# Patient Record
Sex: MALE | Race: WHITE | Employment: OTHER | URBAN - METROPOLITAN AREA
[De-identification: names, ages, dates, MRNs, and addresses within clinical notes are randomized per-mention and may not be internally consistent; named-entity substitution may affect disease eponyms.]

---

## 2017-04-24 RX ORDER — MONTELUKAST SODIUM 10 MG/1
TABLET ORAL
Qty: 90 TABLET | Refills: 3 | Status: SHIPPED | OUTPATIENT
Start: 2017-04-24 | End: 2017-05-25 | Stop reason: SDUPTHER

## 2017-04-25 RX ORDER — TAMSULOSIN HYDROCHLORIDE 0.4 MG/1
CAPSULE ORAL
Qty: 90 CAPSULE | Refills: 3 | Status: SHIPPED | OUTPATIENT
Start: 2017-04-25 | End: 2017-05-25 | Stop reason: SDUPTHER

## 2017-05-08 RX ORDER — LISINOPRIL 5 MG/1
5 TABLET ORAL DAILY
Qty: 90 TABLET | Refills: 3 | Status: SHIPPED | OUTPATIENT
Start: 2017-05-08 | End: 2017-05-25 | Stop reason: SDUPTHER

## 2017-05-08 RX ORDER — METOPROLOL SUCCINATE 25 MG/1
TABLET, EXTENDED RELEASE ORAL
Qty: 90 TABLET | Refills: 3 | Status: SHIPPED | OUTPATIENT
Start: 2017-05-08 | End: 2017-05-25 | Stop reason: SDUPTHER

## 2017-05-25 ENCOUNTER — OFFICE VISIT (OUTPATIENT)
Dept: INTERNAL MEDICINE | Age: 77
End: 2017-05-25
Payer: MEDICARE

## 2017-05-25 VITALS
BODY MASS INDEX: 30.26 KG/M2 | HEIGHT: 65 IN | SYSTOLIC BLOOD PRESSURE: 125 MMHG | HEART RATE: 61 BPM | DIASTOLIC BLOOD PRESSURE: 76 MMHG | WEIGHT: 181.6 LBS

## 2017-05-25 DIAGNOSIS — I10 ESSENTIAL HYPERTENSION: Primary | ICD-10-CM

## 2017-05-25 DIAGNOSIS — J45.20 MILD INTERMITTENT ASTHMA WITHOUT COMPLICATION: ICD-10-CM

## 2017-05-25 DIAGNOSIS — M17.10 ARTHRITIS OF KNEE: ICD-10-CM

## 2017-05-25 DIAGNOSIS — L84 CALLUS OF FOOT: ICD-10-CM

## 2017-05-25 PROCEDURE — 1036F TOBACCO NON-USER: CPT | Performed by: INTERNAL MEDICINE

## 2017-05-25 PROCEDURE — 99213 OFFICE O/P EST LOW 20 MIN: CPT

## 2017-05-25 PROCEDURE — 99213 OFFICE O/P EST LOW 20 MIN: CPT | Performed by: INTERNAL MEDICINE

## 2017-05-25 PROCEDURE — 4040F PNEUMOC VAC/ADMIN/RCVD: CPT | Performed by: INTERNAL MEDICINE

## 2017-05-25 PROCEDURE — G8427 DOCREV CUR MEDS BY ELIG CLIN: HCPCS | Performed by: INTERNAL MEDICINE

## 2017-05-25 PROCEDURE — G8417 CALC BMI ABV UP PARAM F/U: HCPCS | Performed by: INTERNAL MEDICINE

## 2017-05-25 PROCEDURE — 1123F ACP DISCUSS/DSCN MKR DOCD: CPT | Performed by: INTERNAL MEDICINE

## 2017-05-25 RX ORDER — MONTELUKAST SODIUM 10 MG/1
TABLET ORAL
Qty: 90 TABLET | Refills: 3 | Status: SHIPPED | OUTPATIENT
Start: 2017-05-25 | End: 2018-02-22 | Stop reason: SDUPTHER

## 2017-05-25 RX ORDER — METOPROLOL SUCCINATE 25 MG/1
TABLET, EXTENDED RELEASE ORAL
Qty: 90 TABLET | Refills: 3 | Status: SHIPPED | OUTPATIENT
Start: 2017-05-25 | End: 2018-02-22

## 2017-05-25 RX ORDER — FLUTICASONE PROPIONATE 50 MCG
SPRAY, SUSPENSION (ML) NASAL
Qty: 1 BOTTLE | Refills: 5 | Status: SHIPPED | OUTPATIENT
Start: 2017-05-25 | End: 2018-02-22

## 2017-05-25 RX ORDER — TAMSULOSIN HYDROCHLORIDE 0.4 MG/1
CAPSULE ORAL
Qty: 90 CAPSULE | Refills: 3 | Status: SHIPPED | OUTPATIENT
Start: 2017-05-25 | End: 2018-02-22 | Stop reason: SDUPTHER

## 2017-05-25 RX ORDER — LISINOPRIL 5 MG/1
5 TABLET ORAL DAILY
Qty: 90 TABLET | Refills: 3 | Status: SHIPPED | OUTPATIENT
Start: 2017-05-25 | End: 2018-02-22 | Stop reason: SDUPTHER

## 2017-05-25 ASSESSMENT — PATIENT HEALTH QUESTIONNAIRE - PHQ9
SUM OF ALL RESPONSES TO PHQ QUESTIONS 1-9: 4
SUM OF ALL RESPONSES TO PHQ9 QUESTIONS 1 & 2: 0
10. IF YOU CHECKED OFF ANY PROBLEMS, HOW DIFFICULT HAVE THESE PROBLEMS MADE IT FOR YOU TO DO YOUR WORK, TAKE CARE OF THINGS AT HOME, OR GET ALONG WITH OTHER PEOPLE: 0
1. LITTLE INTEREST OR PLEASURE IN DOING THINGS: 0
2. FEELING DOWN, DEPRESSED OR HOPELESS: 0
8. MOVING OR SPEAKING SO SLOWLY THAT OTHER PEOPLE COULD HAVE NOTICED. OR THE OPPOSITE, BEING SO FIGETY OR RESTLESS THAT YOU HAVE BEEN MOVING AROUND A LOT MORE THAN USUAL: 0
6. FEELING BAD ABOUT YOURSELF - OR THAT YOU ARE A FAILURE OR HAVE LET YOURSELF OR YOUR FAMILY DOWN: 0
3. TROUBLE FALLING OR STAYING ASLEEP: 2
5. POOR APPETITE OR OVEREATING: 0
9. THOUGHTS THAT YOU WOULD BE BETTER OFF DEAD, OR OF HURTING YOURSELF: 0
4. FEELING TIRED OR HAVING LITTLE ENERGY: 1
7. TROUBLE CONCENTRATING ON THINGS, SUCH AS READING THE NEWSPAPER OR WATCHING TELEVISION: 1

## 2017-05-25 ASSESSMENT — ENCOUNTER SYMPTOMS
GASTROINTESTINAL NEGATIVE: 1
EYES NEGATIVE: 1
SHORTNESS OF BREATH: 1
ALLERGIC/IMMUNOLOGIC NEGATIVE: 1

## 2017-08-29 ENCOUNTER — OFFICE VISIT (OUTPATIENT)
Dept: INTERNAL MEDICINE | Age: 77
End: 2017-08-29
Payer: MEDICARE

## 2017-08-29 VITALS
WEIGHT: 182 LBS | DIASTOLIC BLOOD PRESSURE: 68 MMHG | HEIGHT: 65 IN | SYSTOLIC BLOOD PRESSURE: 128 MMHG | HEART RATE: 57 BPM | BODY MASS INDEX: 30.32 KG/M2

## 2017-08-29 DIAGNOSIS — J45.20 MILD INTERMITTENT ASTHMA WITHOUT COMPLICATION: ICD-10-CM

## 2017-08-29 DIAGNOSIS — M17.10 ARTHRITIS OF KNEE: ICD-10-CM

## 2017-08-29 DIAGNOSIS — I10 ESSENTIAL HYPERTENSION: Primary | ICD-10-CM

## 2017-08-29 DIAGNOSIS — I20.1 ANGINA PECTORIS, VARIANT (HCC): ICD-10-CM

## 2017-08-29 PROCEDURE — 1036F TOBACCO NON-USER: CPT | Performed by: INTERNAL MEDICINE

## 2017-08-29 PROCEDURE — G8427 DOCREV CUR MEDS BY ELIG CLIN: HCPCS | Performed by: INTERNAL MEDICINE

## 2017-08-29 PROCEDURE — 1123F ACP DISCUSS/DSCN MKR DOCD: CPT | Performed by: INTERNAL MEDICINE

## 2017-08-29 PROCEDURE — 4040F PNEUMOC VAC/ADMIN/RCVD: CPT | Performed by: INTERNAL MEDICINE

## 2017-08-29 PROCEDURE — 99213 OFFICE O/P EST LOW 20 MIN: CPT

## 2017-08-29 PROCEDURE — G8417 CALC BMI ABV UP PARAM F/U: HCPCS | Performed by: INTERNAL MEDICINE

## 2017-08-29 PROCEDURE — 99213 OFFICE O/P EST LOW 20 MIN: CPT | Performed by: INTERNAL MEDICINE

## 2017-08-29 PROCEDURE — G8598 ASA/ANTIPLAT THER USED: HCPCS | Performed by: INTERNAL MEDICINE

## 2017-08-29 ASSESSMENT — ENCOUNTER SYMPTOMS
SHORTNESS OF BREATH: 1
EYES NEGATIVE: 1
ALLERGIC/IMMUNOLOGIC NEGATIVE: 1
GASTROINTESTINAL NEGATIVE: 1

## 2017-10-26 ENCOUNTER — TELEPHONE (OUTPATIENT)
Dept: INTERNAL MEDICINE | Age: 77
End: 2017-10-26

## 2017-10-26 NOTE — TELEPHONE ENCOUNTER
PC to [facility]----Stress test from 10/12/17 scanned into media along with EKG scanned into media

## 2018-02-21 ENCOUNTER — TELEPHONE (OUTPATIENT)
Dept: INTERNAL MEDICINE | Age: 78
End: 2018-02-21

## 2018-02-22 ENCOUNTER — OFFICE VISIT (OUTPATIENT)
Dept: INTERNAL MEDICINE | Age: 78
End: 2018-02-22
Payer: MEDICARE

## 2018-02-22 VITALS
SYSTOLIC BLOOD PRESSURE: 119 MMHG | HEIGHT: 65 IN | DIASTOLIC BLOOD PRESSURE: 71 MMHG | HEART RATE: 63 BPM | WEIGHT: 177 LBS | BODY MASS INDEX: 29.49 KG/M2

## 2018-02-22 DIAGNOSIS — F41.9 ANXIETY: ICD-10-CM

## 2018-02-22 DIAGNOSIS — J45.20 MILD INTERMITTENT ASTHMA WITHOUT COMPLICATION: Primary | ICD-10-CM

## 2018-02-22 DIAGNOSIS — M17.10 ARTHRITIS OF KNEE: ICD-10-CM

## 2018-02-22 DIAGNOSIS — E55.9 VITAMIN D DEFICIENCY: ICD-10-CM

## 2018-02-22 DIAGNOSIS — L84 CALLUS OF FOOT: ICD-10-CM

## 2018-02-22 DIAGNOSIS — E53.8 B12 DEFICIENCY: ICD-10-CM

## 2018-02-22 DIAGNOSIS — N40.1 BENIGN PROSTATIC HYPERPLASIA WITH NOCTURIA: ICD-10-CM

## 2018-02-22 DIAGNOSIS — I10 ESSENTIAL HYPERTENSION: ICD-10-CM

## 2018-02-22 DIAGNOSIS — R35.1 BENIGN PROSTATIC HYPERPLASIA WITH NOCTURIA: ICD-10-CM

## 2018-02-22 PROCEDURE — 4040F PNEUMOC VAC/ADMIN/RCVD: CPT | Performed by: STUDENT IN AN ORGANIZED HEALTH CARE EDUCATION/TRAINING PROGRAM

## 2018-02-22 PROCEDURE — G8417 CALC BMI ABV UP PARAM F/U: HCPCS | Performed by: STUDENT IN AN ORGANIZED HEALTH CARE EDUCATION/TRAINING PROGRAM

## 2018-02-22 PROCEDURE — 99213 OFFICE O/P EST LOW 20 MIN: CPT | Performed by: STUDENT IN AN ORGANIZED HEALTH CARE EDUCATION/TRAINING PROGRAM

## 2018-02-22 PROCEDURE — 99214 OFFICE O/P EST MOD 30 MIN: CPT | Performed by: STUDENT IN AN ORGANIZED HEALTH CARE EDUCATION/TRAINING PROGRAM

## 2018-02-22 PROCEDURE — G8484 FLU IMMUNIZE NO ADMIN: HCPCS | Performed by: STUDENT IN AN ORGANIZED HEALTH CARE EDUCATION/TRAINING PROGRAM

## 2018-02-22 PROCEDURE — G8427 DOCREV CUR MEDS BY ELIG CLIN: HCPCS | Performed by: STUDENT IN AN ORGANIZED HEALTH CARE EDUCATION/TRAINING PROGRAM

## 2018-02-22 PROCEDURE — 1036F TOBACCO NON-USER: CPT | Performed by: STUDENT IN AN ORGANIZED HEALTH CARE EDUCATION/TRAINING PROGRAM

## 2018-02-22 PROCEDURE — 1123F ACP DISCUSS/DSCN MKR DOCD: CPT | Performed by: STUDENT IN AN ORGANIZED HEALTH CARE EDUCATION/TRAINING PROGRAM

## 2018-02-22 RX ORDER — MONTELUKAST SODIUM 10 MG/1
10 TABLET ORAL NIGHTLY
Qty: 90 TABLET | Refills: 1 | Status: SHIPPED | OUTPATIENT
Start: 2018-02-22 | End: 2018-07-19 | Stop reason: SDUPTHER

## 2018-02-22 RX ORDER — TAMSULOSIN HYDROCHLORIDE 0.4 MG/1
0.4 CAPSULE ORAL DAILY
Qty: 90 CAPSULE | Refills: 1 | Status: SHIPPED | OUTPATIENT
Start: 2018-02-22 | End: 2018-07-19 | Stop reason: SDUPTHER

## 2018-02-22 RX ORDER — METOPROLOL SUCCINATE 25 MG/1
TABLET, EXTENDED RELEASE ORAL
Qty: 90 TABLET | Refills: 3 | Status: CANCELLED | OUTPATIENT
Start: 2018-02-22

## 2018-02-22 RX ORDER — LISINOPRIL 5 MG/1
5 TABLET ORAL DAILY
Qty: 90 TABLET | Refills: 1 | Status: SHIPPED | OUTPATIENT
Start: 2018-02-22 | End: 2018-06-18 | Stop reason: ALTCHOICE

## 2018-02-22 RX ORDER — ALPRAZOLAM 0.25 MG/1
0.25 TABLET ORAL NIGHTLY PRN
Qty: 4 TABLET | Refills: 0 | Status: SHIPPED | OUTPATIENT
Start: 2018-02-22 | End: 2018-03-24

## 2018-02-22 RX ORDER — ACETAMINOPHEN 500 MG
500 TABLET ORAL EVERY 8 HOURS PRN
Qty: 120 TABLET | Refills: 2 | Status: SHIPPED | OUTPATIENT
Start: 2018-02-22 | End: 2018-07-19 | Stop reason: SDUPTHER

## 2018-02-22 RX ORDER — ATENOLOL 25 MG/1
25 TABLET ORAL DAILY
Qty: 90 TABLET | Refills: 1 | Status: SHIPPED | OUTPATIENT
Start: 2018-02-22 | End: 2018-07-19 | Stop reason: SDUPTHER

## 2018-02-22 ASSESSMENT — PATIENT HEALTH QUESTIONNAIRE - PHQ9
1. LITTLE INTEREST OR PLEASURE IN DOING THINGS: 0
2. FEELING DOWN, DEPRESSED OR HOPELESS: 0
SUM OF ALL RESPONSES TO PHQ9 QUESTIONS 1 & 2: 0
SUM OF ALL RESPONSES TO PHQ QUESTIONS 1-9: 0

## 2018-02-22 NOTE — PROGRESS NOTES
400 UNITS CAPS Take 400 Units by mouth daily.  calcium & magnesium carbonates (MYLANTA) per tablet Take  by mouth daily. Half tab       SAW PALMETTO by Does not apply route. No current facility-administered medications on file prior to visit. SOCIAL HISTORY    Reviewed and no change from previous record. Za Watkins  reports that he has never smoked. He has never used smokeless tobacco.    FAMILY HISTORY:    Reviewed and No change from previous visit    REVIEW OF SYSTEMS:    ENT: negative  Respiratory: no cough, shortness of breath, or wheezing  Cardiovascular: no chest pain or dyspnea on exertion  Gastrointestinal: no abdominal pain, black or bloody stools  Neurological: no TIA or stroke symptoms  Endocrine: negative  Genito-Urinary: no dysuria, trouble voiding, or hematuria  Musculoskeletal: negative  Dermatological: negative    PHYSICAL EXAM:      Vitals:    02/22/18 1320   BP: 119/71   Pulse: 63     General appearance - alert, well appearing, and in no distress  Chest - clear to auscultation, no wheezes, rales or rhonchi, symmetric air entry  Heart - normal rate, regular rhythm, normal S1, S2, no murmurs, rubs, clicks or gallops  Abdomen - soft, nontender, nondistended, no masses or organomegaly   Male - no penile lesions or discharge, no testicular masses or tenderness, no hernias  Neurological - alert, oriented, normal speech, no focal findings or movement disorder noted  Musculoskeletal - no joint tenderness, deformity or swelling. Left foot has a callus on the sole.     LABORATORY FINDINGS:    CBC:No results found for: WBC, HGB, PLT  BMP:  No results found for: NA, K, CL, CO2, BUN, CREATININE, GLUCOSE  HEMOGLOBIN A1C: No results found for: LABA1C  MICROALBUMIN URINE: No results found for: MICROALBUR  FASTING LIPID PANEL:  Lab Results   Component Value Date    CHOL 188 11/21/2016    HDL 54 11/21/2016    TRIG 95 11/21/2016     LIVER PROFILE:No results found for: ALT, AST, PROT, BILITOT,

## 2018-02-22 NOTE — PATIENT INSTRUCTIONS
Return To Clinic 6/18/18. After Visit Summary given and reviewed. bb    It is very important for your care that you keep your appointment. If for some reason you are unable to keep your appointment it is equally important that you call our office at [phone] to cancel your appointment and reschedule. Failure to do so may result in your termination from our practice. Referral for podiatry sent to [facility]  they will call pt for appt, copy of referral with number and address given to pt. Pt should call referral number if not heard from within a couple of weeks. LABORATORY INSTRUCTIONS    Your doctor has ordered blood or urine testing. You can get this testing done at the Lab located on the first floor of the [facility], or at any other [facility]. Please stop at Main Registration, before going to the lab, as you must be registered first.     Please get this lab done before your next visit.     You may eat or drink before this test.

## 2018-03-01 LAB
ALBUMIN SERPL-MCNC: 4.6 G/DL (ref 3.5–5.2)
ALK PHOSPHATASE: 69 U/L (ref 39–118)
ALT SERPL-CCNC: 19 U/L (ref 5–50)
ANION GAP SERPL CALCULATED.3IONS-SCNC: 14 MEQ/L (ref 10–19)
AST SERPL-CCNC: 22 U/L (ref 9–50)
BILIRUB SERPL-MCNC: 0.5 MG/DL
BUN BLDV-MCNC: 23 MG/DL (ref 8–23)
CALCIUM SERPL-MCNC: 10.1 MG/DL (ref 8.5–10.5)
CHLORIDE BLD-SCNC: 103 MEQ/L (ref 95–107)
CO2: 26 MEQ/L (ref 19–31)
CREAT SERPL-MCNC: 1.5 MG/DL (ref 0.8–1.4)
EGFR AFRICAN AMERICAN: 51.3 ML/MIN/1.73 M2
EGFR IF NONAFRICAN AMERICAN: 44.3 ML/MIN/1.73 M2
FOLATE: >20 UG/L
GLUCOSE: 86 MG/DL (ref 70–99)
POTASSIUM SERPL-SCNC: 4.5 MEQ/L (ref 3.5–5.4)
SODIUM BLD-SCNC: 143 MEQ/L (ref 135–146)
TOTAL PROTEIN: 7.5 G/DL (ref 6.1–8.3)
VITAMIN B-12: 833.8 PG/ML (ref 200–950)

## 2018-03-02 LAB — VITAMIN D 25-HYDROXY: 31 NG/ML

## 2018-05-14 ENCOUNTER — TELEPHONE (OUTPATIENT)
Dept: INTERNAL MEDICINE | Age: 78
End: 2018-05-14

## 2018-06-18 ENCOUNTER — OFFICE VISIT (OUTPATIENT)
Dept: INTERNAL MEDICINE | Age: 78
End: 2018-06-18
Payer: MEDICARE

## 2018-06-18 VITALS
SYSTOLIC BLOOD PRESSURE: 127 MMHG | BODY MASS INDEX: 29.99 KG/M2 | HEIGHT: 65 IN | DIASTOLIC BLOOD PRESSURE: 71 MMHG | HEART RATE: 54 BPM | WEIGHT: 180 LBS

## 2018-06-18 DIAGNOSIS — J45.20 MILD INTERMITTENT ASTHMA WITHOUT COMPLICATION: Primary | ICD-10-CM

## 2018-06-18 DIAGNOSIS — R79.89 CREATININE ELEVATION: ICD-10-CM

## 2018-06-18 DIAGNOSIS — N40.1 BENIGN PROSTATIC HYPERPLASIA WITH NOCTURIA: ICD-10-CM

## 2018-06-18 DIAGNOSIS — R35.1 BENIGN PROSTATIC HYPERPLASIA WITH NOCTURIA: ICD-10-CM

## 2018-06-18 DIAGNOSIS — I10 ESSENTIAL HYPERTENSION: ICD-10-CM

## 2018-06-18 PROCEDURE — 4040F PNEUMOC VAC/ADMIN/RCVD: CPT | Performed by: STUDENT IN AN ORGANIZED HEALTH CARE EDUCATION/TRAINING PROGRAM

## 2018-06-18 PROCEDURE — 1123F ACP DISCUSS/DSCN MKR DOCD: CPT | Performed by: STUDENT IN AN ORGANIZED HEALTH CARE EDUCATION/TRAINING PROGRAM

## 2018-06-18 PROCEDURE — G8417 CALC BMI ABV UP PARAM F/U: HCPCS | Performed by: STUDENT IN AN ORGANIZED HEALTH CARE EDUCATION/TRAINING PROGRAM

## 2018-06-18 PROCEDURE — 1036F TOBACCO NON-USER: CPT | Performed by: STUDENT IN AN ORGANIZED HEALTH CARE EDUCATION/TRAINING PROGRAM

## 2018-06-18 PROCEDURE — 99214 OFFICE O/P EST MOD 30 MIN: CPT | Performed by: STUDENT IN AN ORGANIZED HEALTH CARE EDUCATION/TRAINING PROGRAM

## 2018-06-18 PROCEDURE — 99213 OFFICE O/P EST LOW 20 MIN: CPT | Performed by: STUDENT IN AN ORGANIZED HEALTH CARE EDUCATION/TRAINING PROGRAM

## 2018-06-18 PROCEDURE — G8427 DOCREV CUR MEDS BY ELIG CLIN: HCPCS | Performed by: STUDENT IN AN ORGANIZED HEALTH CARE EDUCATION/TRAINING PROGRAM

## 2018-06-18 RX ORDER — FINASTERIDE 5 MG/1
5 TABLET, FILM COATED ORAL DAILY
Qty: 30 TABLET | Refills: 3 | Status: SHIPPED | OUTPATIENT
Start: 2018-06-18 | End: 2018-08-06 | Stop reason: SDUPTHER

## 2018-06-18 RX ORDER — AMLODIPINE BESYLATE 5 MG/1
5 TABLET ORAL DAILY
Qty: 30 TABLET | Refills: 3 | Status: SHIPPED | OUTPATIENT
Start: 2018-06-18 | End: 2018-11-05 | Stop reason: SDUPTHER

## 2018-06-26 LAB
APPEARANCE: CLEAR
BILIRUBIN: NEGATIVE
COLOR: YELLOW
GLUCOSE BLD-MCNC: NEGATIVE MG/DL
KETONES, URINE: NEGATIVE
LEUKOCYTE ESTERASE, URINE: NEGATIVE
NITRITE, URINE: NEGATIVE
OCCULT BLOOD,URINE: NEGATIVE
PH: 6 (ref 5–9)
PROTEIN, URINE: NEGATIVE
SP GRAVITY MISCELLANEOUS: 1.02 (ref 1–1.03)
UROBILINOGEN, URINE: NORMAL

## 2018-06-27 LAB — PSA, ULTRASENSITIVE: 1.42 NG/ML

## 2018-07-12 ENCOUNTER — HOSPITAL ENCOUNTER (OUTPATIENT)
Dept: ULTRASOUND IMAGING | Age: 78
Discharge: HOME | End: 2018-07-14
Payer: MEDICARE

## 2018-07-12 DIAGNOSIS — R79.89 CREATININE ELEVATION: ICD-10-CM

## 2018-07-12 DIAGNOSIS — R35.1 BENIGN PROSTATIC HYPERPLASIA WITH NOCTURIA: ICD-10-CM

## 2018-07-12 DIAGNOSIS — N40.1 BENIGN PROSTATIC HYPERPLASIA WITH NOCTURIA: ICD-10-CM

## 2018-07-12 PROCEDURE — 76872 US TRANSRECTAL: CPT

## 2018-07-12 PROCEDURE — 76770 US EXAM ABDO BACK WALL COMP: CPT

## 2018-07-13 ENCOUNTER — TELEPHONE (OUTPATIENT)
Dept: INTERNAL MEDICINE | Age: 78
End: 2018-07-13

## 2018-07-13 NOTE — TELEPHONE ENCOUNTER
Patient called office and left message wondering if the lab results from [facility] and the ultrasound results from [facility] were available to Dr. Chantel Madison for his appointment on 07/19/18. PC to patient and left message for him verifying these results are available for his next appointment.

## 2018-07-19 ENCOUNTER — OFFICE VISIT (OUTPATIENT)
Dept: INTERNAL MEDICINE | Age: 78
End: 2018-07-19
Payer: MEDICARE

## 2018-07-19 VITALS
WEIGHT: 181 LBS | HEART RATE: 60 BPM | DIASTOLIC BLOOD PRESSURE: 62 MMHG | BODY MASS INDEX: 30.16 KG/M2 | SYSTOLIC BLOOD PRESSURE: 110 MMHG | HEIGHT: 65 IN

## 2018-07-19 DIAGNOSIS — R35.1 BENIGN PROSTATIC HYPERPLASIA WITH NOCTURIA: ICD-10-CM

## 2018-07-19 DIAGNOSIS — M17.10 ARTHRITIS OF KNEE: ICD-10-CM

## 2018-07-19 DIAGNOSIS — Z23 NEED FOR PROPHYLACTIC VACCINATION AND INOCULATION AGAINST VARICELLA: ICD-10-CM

## 2018-07-19 DIAGNOSIS — Z23 NEED FOR PROPHYLACTIC VACCINATION AGAINST STREPTOCOCCUS PNEUMONIAE (PNEUMOCOCCUS): ICD-10-CM

## 2018-07-19 DIAGNOSIS — J45.20 MILD INTERMITTENT ASTHMA WITHOUT COMPLICATION: ICD-10-CM

## 2018-07-19 DIAGNOSIS — I10 ESSENTIAL HYPERTENSION: Primary | ICD-10-CM

## 2018-07-19 DIAGNOSIS — R22.31 MASS OF RIGHT HAND: ICD-10-CM

## 2018-07-19 DIAGNOSIS — N40.1 BENIGN PROSTATIC HYPERPLASIA WITH NOCTURIA: ICD-10-CM

## 2018-07-19 DIAGNOSIS — Z23 NEED FOR PROPHYLACTIC VACCINATION AGAINST DIPHTHERIA-TETANUS-PERTUSSIS (DTP): ICD-10-CM

## 2018-07-19 PROCEDURE — 4040F PNEUMOC VAC/ADMIN/RCVD: CPT | Performed by: INTERNAL MEDICINE

## 2018-07-19 PROCEDURE — G8417 CALC BMI ABV UP PARAM F/U: HCPCS | Performed by: INTERNAL MEDICINE

## 2018-07-19 PROCEDURE — 99213 OFFICE O/P EST LOW 20 MIN: CPT | Performed by: INTERNAL MEDICINE

## 2018-07-19 PROCEDURE — 99214 OFFICE O/P EST MOD 30 MIN: CPT | Performed by: INTERNAL MEDICINE

## 2018-07-19 PROCEDURE — 90732 PPSV23 VACC 2 YRS+ SUBQ/IM: CPT | Performed by: INTERNAL MEDICINE

## 2018-07-19 PROCEDURE — 1101F PT FALLS ASSESS-DOCD LE1/YR: CPT | Performed by: INTERNAL MEDICINE

## 2018-07-19 PROCEDURE — 1123F ACP DISCUSS/DSCN MKR DOCD: CPT | Performed by: INTERNAL MEDICINE

## 2018-07-19 PROCEDURE — 1036F TOBACCO NON-USER: CPT | Performed by: INTERNAL MEDICINE

## 2018-07-19 PROCEDURE — G8427 DOCREV CUR MEDS BY ELIG CLIN: HCPCS | Performed by: INTERNAL MEDICINE

## 2018-07-19 RX ORDER — MONTELUKAST SODIUM 10 MG/1
10 TABLET ORAL NIGHTLY
Qty: 90 TABLET | Refills: 1 | Status: SHIPPED | OUTPATIENT
Start: 2018-07-19 | End: 2018-12-27 | Stop reason: SDUPTHER

## 2018-07-19 RX ORDER — ACETAMINOPHEN 500 MG
500 TABLET ORAL EVERY 8 HOURS PRN
Qty: 120 TABLET | Refills: 2 | Status: SHIPPED | OUTPATIENT
Start: 2018-07-19 | End: 2019-02-11 | Stop reason: SDUPTHER

## 2018-07-19 RX ORDER — ATENOLOL 25 MG/1
25 TABLET ORAL DAILY
Qty: 90 TABLET | Refills: 1 | Status: SHIPPED | OUTPATIENT
Start: 2018-07-19 | End: 2018-08-28

## 2018-07-19 RX ORDER — TAMSULOSIN HYDROCHLORIDE 0.4 MG/1
0.4 CAPSULE ORAL DAILY
Qty: 90 CAPSULE | Refills: 1 | Status: SHIPPED | OUTPATIENT
Start: 2018-07-19 | End: 2018-11-21 | Stop reason: SDUPTHER

## 2018-07-19 ASSESSMENT — ENCOUNTER SYMPTOMS
ABDOMINAL PAIN: 0
NAUSEA: 0
HEARTBURN: 0
DOUBLE VISION: 0
HEMOPTYSIS: 0
COUGH: 0
BLURRED VISION: 0

## 2018-07-19 ASSESSMENT — PATIENT HEALTH QUESTIONNAIRE - PHQ9
1. LITTLE INTEREST OR PLEASURE IN DOING THINGS: 0
SUM OF ALL RESPONSES TO PHQ9 QUESTIONS 1 & 2: 0
2. FEELING DOWN, DEPRESSED OR HOPELESS: 0
SUM OF ALL RESPONSES TO PHQ QUESTIONS 1-9: 0

## 2018-07-19 NOTE — PROGRESS NOTES
[facility]  [facility] [address]  [address] [facility] [address]  Dept: [phone]  Dept Fax: [phone]    Office Progress/Follow Up Note  Date of patient's visit: 7/19/2018  Patient's Name:  Ranjeet Erwin YOB: 1940            Patient Care Team:  Yamilka Avalos MD as PCP - General (Internal Medicine)  Mbonger Sanchez MD as PCP - [facility] Attributed Provider  ================================================================    REASON FOR VISIT/CHIEF COMPLAINT:  Hypertension and Mass (right hand lump)    HISTORY OF PRESENTING ILLNESS:  History was obtained from: patient. Ranjeet Erwin is a 66 y.o. is here for a follow-up visit for review of recent blood work. Had a day done for reevaluation of BPH. He was started on finasteride in addition to Flomax for treatment of BPH symptoms. He tells me that he hasn't noticed that much improvement with the new medication. He has to urinate 3 times at night. Ultrasound shows enlargement of the prostate without any evidence of cancer. Patient has not been evaluated by urology and has not had biopsy done. He has history of hypertension which is controlled. His asthma is also controlled. He is due for medication refills today. Problem list, medications and blood work reviewed       Patient Active Problem List   Diagnosis    Asthma    Osteoarthritis of knees    HTN (hypertension)    Benign prostatic hyperplasia with nocturia       Health Maintenance Due   Topic Date Due    DTaP/Tdap/Td vaccine (1 - Tdap) 07/10/1959    Shingles Vaccine (1 of 2 - 2 Dose Series) 07/10/1990       Allergies   Allergen Reactions    Ibuprofen          Current Outpatient Prescriptions   Medication Sig Dispense Refill    Tdap (ADACEL) 5-2-15.5 LF-MCG/0.5 injection Inject 0.5 mLs into the muscle once for 1 dose 0.5 mL 0    zoster recombinant adjuvanted vaccine (SHINGRIX) 50 MCG SUSR injection 50 MCG IM then repeat 2-6 months.  0.5 mL 1    fluticasone-salmeterol (ADVAIR DISKUS) 250-50 MCG/DOSE AEPB Inhale 1 puff into the lungs 2 times daily 3 each 1    montelukast (SINGULAIR) 10 MG tablet Take 1 tablet by mouth nightly 90 tablet 1    tamsulosin (FLOMAX) 0.4 MG capsule Take 1 capsule by mouth daily 90 capsule 1    acetaminophen (TYLENOL) 500 MG tablet Take 1 tablet by mouth every 8 hours as needed for Pain 120 tablet 2    atenolol (TENORMIN) 25 MG tablet Take 1 tablet by mouth daily Dc Toprol 90 tablet 1    finasteride (PROSCAR) 5 MG tablet Take 1 tablet by mouth daily 30 tablet 3    amLODIPine (NORVASC) 5 MG tablet Take 1 tablet by mouth daily 30 tablet 3    Minoxidil (ROGAINE MENS EX) Apply  topically.  vitamin B-12 (CYANOCOBALAMIN) 1000 MCG tablet Take 1,000 mcg by mouth daily Takes 1/2 tablet daily      Multiple Vitamins-Minerals (COMPLETE SENIOR PO) Take by mouth Takes 1/2 tablet daily      Selenium 200 MCG TABS Take  by mouth. Half tab daily       vitamin D (ERGOCALCIFEROL) 400 UNITS CAPS Take 400 Units by mouth daily Takes 1/2 tablet daily      SAW PALMETTO by Does not apply route.  calcium & magnesium carbonates (MYLANTA) per tablet Take  by mouth daily. Half tab        No current facility-administered medications for this visit. Social History   Substance Use Topics    Smoking status: Never Smoker    Smokeless tobacco: Never Used    Alcohol use 2.4 oz/week     4 Shots of liquor per week       Family History   Problem Relation Age of Onset    Cancer Mother 80        colon    Heart Disease Mother     Heart Disease Father     Other Father 70        ALS        REVIEW OF SYSTEMS:  Review of Systems   Constitutional: Negative for chills and fever. HENT: Negative for congestion, ear discharge and nosebleeds. Eyes: Negative for blurred vision and double vision. Respiratory: Negative for cough and hemoptysis. Cardiovascular: Negative for chest pain and palpitations.    Gastrointestinal: Negative for abdominal pain, heartburn and nausea. Genitourinary: Negative for dysuria and urgency. Musculoskeletal: Negative for myalgias and neck pain. Neurological: Negative for dizziness and headaches. Endo/Heme/Allergies: Does not bruise/bleed easily. Psychiatric/Behavioral: Negative for depression and suicidal ideas. PHYSICAL EXAM:  Vitals:    07/19/18 1343   BP: 110/62   Site: Left Arm   Position: Sitting   Cuff Size: Medium Adult   Pulse: 60   Weight: 181 lb (82.1 kg)   Height: 5' 5\" (1.651 m)     BP Readings from Last 3 Encounters:   07/19/18 110/62   06/18/18 127/71   02/22/18 119/71        Physical Exam   Constitutional: He is oriented to person, place, and time and well-developed, well-nourished, and in no distress. No distress. HENT:   Mouth/Throat: No oropharyngeal exudate. Neck: Normal range of motion. Neck supple. No thyromegaly present. Cardiovascular: Normal rate, regular rhythm, normal heart sounds and intact distal pulses. Pulmonary/Chest: Effort normal and breath sounds normal. No respiratory distress. He has no wheezes. Abdominal: Soft. Bowel sounds are normal. He exhibits no distension and no mass. There is no tenderness. Musculoskeletal: Normal range of motion. He exhibits no edema or tenderness. Neurological: He is alert and oriented to person, place, and time. No cranial nerve deficit. Skin: Skin is warm. No rash noted. He is not diaphoretic. No erythema.    Psychiatric: Mood, memory, affect and judgment normal.         DIAGNOSTIC FINDINGS:  CBC:No results found for: WBC, HGB, PLT    BMP:    Lab Results   Component Value Date     02/28/2018    K 4.5 02/28/2018     02/28/2018    CO2 26 02/28/2018    BUN 23 02/28/2018    CREATININE 1.5 02/28/2018    GLUCOSE NEGATIVE 06/26/2018    GLUCOSE 86 02/28/2018       HEMOGLOBIN A1C: No results found for: LABA1C    FASTING LIPID PANEL:  Lab Results   Component Value Date    CHOL 188 11/21/2016    HDL 54 11/21/2016 TRIG 95 11/21/2016       ASSESSMENT AND PLAN:  Mychal Russ was seen today for hypertension and mass. Diagnoses and all orders for this visit:    Essential hypertension  -     atenolol (TENORMIN) 25 MG tablet; Take 1 tablet by mouth daily Dc Toprol    Need for prophylactic vaccination against diphtheria-tetanus-pertussis (DTP)  -     Tdap (ADACEL) 5-2-15.5 LF-MCG/0.5 injection; Inject 0.5 mLs into the muscle once for 1 dose    Need for prophylactic vaccination and inoculation against varicella  -     zoster recombinant adjuvanted vaccine (SHINGRIX) 50 MCG SUSR injection; 50 MCG IM then repeat 2-6 months. Need for prophylactic vaccination against Streptococcus pneumoniae (pneumococcus)  -     Pneumococcal polysaccharide vaccine 23-valent greater than or equal to 3yo subcutaneous/IM  -     NE IMMUNIZ ADMIN,1 SINGLE/COMB VAC/TOXOID    Mild intermittent asthma without complication  -     fluticasone-salmeterol (ADVAIR DISKUS) 250-50 MCG/DOSE AEPB; Inhale 1 puff into the lungs 2 times daily  -     montelukast (SINGULAIR) 10 MG tablet; Take 1 tablet by mouth nightly    Benign prostatic hyperplasia with nocturia  -     tamsulosin (FLOMAX) 0.4 MG capsule; Take 1 capsule by mouth daily  -     [address], [place]    Osteoarthritis of knees  -     acetaminophen (TYLENOL) 500 MG tablet; Take 1 tablet by mouth every 8 hours as needed for Pain    Mass of right hand  -     XR HAND RIGHT (MIN 3 VIEWS); Future      FOLLOW UP AND INSTRUCTIONS:  · Return in about 3 months (around 10/19/2018) for HTN. · Mychal Russ received counseling on the following healthy behaviors: nutrition and exercise    · Discussed use, benefit, and side effects of prescribed medications. Barriers to medication compliance addressed. All patient questions answered. Pt voiced understanding.      · Patient given educational materials - see patient instructions    Dulce Velasquez MD, ANNALISE, [facility] Physician -

## 2018-07-30 ENCOUNTER — TELEPHONE (OUTPATIENT)
Dept: INTERNAL MEDICINE | Age: 78
End: 2018-07-30

## 2018-07-30 NOTE — TELEPHONE ENCOUNTER
Voice message left from patient stating he had received call from [address] stating they were waiting for a response from Dr. Denise Rios for a refill on medications via fax. PC to [facility], spoke with Alix Merida, who states there our no pending orders from our office that they are waiting on. PC to patient, left voice message that I had spoken with Alix Merida at [address] and they did not indicate they needed a response from our office. Suggested to patient that if he has a Dr. Denise Rios, possibly this order could have originated in another office and he could try there. Asked patient to return call to office if any further questions.

## 2018-08-06 DIAGNOSIS — R35.1 BENIGN PROSTATIC HYPERPLASIA WITH NOCTURIA: ICD-10-CM

## 2018-08-06 DIAGNOSIS — N40.1 BENIGN PROSTATIC HYPERPLASIA WITH NOCTURIA: ICD-10-CM

## 2018-08-07 RX ORDER — FINASTERIDE 5 MG/1
5 TABLET, FILM COATED ORAL DAILY
Qty: 90 TABLET | Refills: 3 | Status: SHIPPED | OUTPATIENT
Start: 2018-08-07 | End: 2018-08-09 | Stop reason: SDUPTHER

## 2018-08-09 DIAGNOSIS — R35.1 BENIGN PROSTATIC HYPERPLASIA WITH NOCTURIA: ICD-10-CM

## 2018-08-09 DIAGNOSIS — N40.1 BENIGN PROSTATIC HYPERPLASIA WITH NOCTURIA: ICD-10-CM

## 2018-08-09 RX ORDER — FINASTERIDE 5 MG/1
5 TABLET, FILM COATED ORAL DAILY
Qty: 90 TABLET | Refills: 3 | Status: SHIPPED | OUTPATIENT
Start: 2018-08-09 | End: 2019-02-11 | Stop reason: SDUPTHER

## 2018-08-09 NOTE — TELEPHONE ENCOUNTER
I pended another order for proscar, it needs to be sent to [facility] instead of [facility]. (I cancelled the [facility] script)  Please sign if applicable. Thank you!       Health Maintenance   Topic Date Due    DTaP/Tdap/Td vaccine (1 - Tdap) 07/10/1959    Shingles Vaccine (1 of 2 - 2 Dose Series) 07/10/1990    Flu vaccine (1) 09/01/2018    Pneumococcal low/med risk  Completed             (applicable per patient's age: Cancer Screenings, Depression Screening, Fall Risk Screening, Immunizations)    LDL Calculated (mg/dL)   Date Value   11/21/2016 115     AST (U/L)   Date Value   02/28/2018 22     ALT (U/L)   Date Value   02/28/2018 19     BUN (mg/dL)   Date Value   02/28/2018 23      (goal A1C is < 7)   (goal LDL is <100) need 30-50% reduction from baseline     BP Readings from Last 3 Encounters:   07/19/18 110/62   06/18/18 127/71   02/22/18 119/71    (goal /80)      All Future Testing planned in [facility]:  Lab Frequency Next Occurrence   Urinalysis Reflex to Culture Once 11/01/2018   PSA Screening Once 11/01/2018   XR HAND RIGHT (MIN 3 VIEWS) Once 11/03/2018       Next Visit Date:  Future Appointments  Date Time Provider Lambert Thomas   11/5/2018 1:15 PM Dulce Burns MD [id number]            Patient Active Problem List:     Asthma     Osteoarthritis of knees     HTN (hypertension)     Benign prostatic hyperplasia with nocturia

## 2018-08-27 DIAGNOSIS — I10 ESSENTIAL HYPERTENSION: ICD-10-CM

## 2018-08-27 NOTE — TELEPHONE ENCOUNTER
E-scribe request for ATENOLOL TAB 25MG. Please review and e-scribe if applicable.      Last Visit Date:  7/19/18  Next Visit Date:  11/5/2018    No results found for: LABA1C          ( goal A1C is < 7)   No results found for: LABMICR  LDL Calculated (mg/dL)   Date Value   11/21/2016 115       (goal LDL is <100)   AST (U/L)   Date Value   02/28/2018 22     ALT (U/L)   Date Value   02/28/2018 19     BUN (mg/dL)   Date Value   02/28/2018 23     BP Readings from Last 3 Encounters:   07/19/18 110/62   06/18/18 127/71   02/22/18 119/71          (goal 120/80)        Patient Active Problem List:     Asthma     Osteoarthritis of knees     HTN (hypertension)     Benign prostatic hyperplasia with nocturia      ----JF

## 2018-08-28 RX ORDER — ATENOLOL 25 MG/1
TABLET ORAL
Qty: 90 TABLET | Refills: 1 | Status: SHIPPED | OUTPATIENT
Start: 2018-08-28 | End: 2019-02-11 | Stop reason: SDUPTHER

## 2018-08-30 RX ORDER — ATENOLOL 25 MG/1
TABLET ORAL
Qty: 90 TABLET | Refills: 1 | Status: CANCELLED | OUTPATIENT
Start: 2018-08-30

## 2018-08-30 NOTE — TELEPHONE ENCOUNTER
I pended this medication again, it needed to be sent to [facility] mail order instead of [facility]. Thank you!

## 2018-11-05 ENCOUNTER — OFFICE VISIT (OUTPATIENT)
Dept: INTERNAL MEDICINE | Age: 78
End: 2018-11-05
Payer: MEDICARE

## 2018-11-05 VITALS
HEART RATE: 63 BPM | DIASTOLIC BLOOD PRESSURE: 70 MMHG | WEIGHT: 183.2 LBS | SYSTOLIC BLOOD PRESSURE: 111 MMHG | BODY MASS INDEX: 30.52 KG/M2 | HEIGHT: 65 IN

## 2018-11-05 DIAGNOSIS — N40.1 BENIGN PROSTATIC HYPERPLASIA WITH NOCTURIA: ICD-10-CM

## 2018-11-05 DIAGNOSIS — M17.10 ARTHRITIS OF KNEE: ICD-10-CM

## 2018-11-05 DIAGNOSIS — I10 ESSENTIAL HYPERTENSION: Primary | ICD-10-CM

## 2018-11-05 DIAGNOSIS — J45.20 MILD INTERMITTENT ASTHMA WITHOUT COMPLICATION: ICD-10-CM

## 2018-11-05 DIAGNOSIS — R35.1 BENIGN PROSTATIC HYPERPLASIA WITH NOCTURIA: ICD-10-CM

## 2018-11-05 PROCEDURE — 99211 OFF/OP EST MAY X REQ PHY/QHP: CPT | Performed by: INTERNAL MEDICINE

## 2018-11-05 PROCEDURE — 99214 OFFICE O/P EST MOD 30 MIN: CPT | Performed by: INTERNAL MEDICINE

## 2018-11-05 PROCEDURE — G8417 CALC BMI ABV UP PARAM F/U: HCPCS | Performed by: INTERNAL MEDICINE

## 2018-11-05 PROCEDURE — 1123F ACP DISCUSS/DSCN MKR DOCD: CPT | Performed by: INTERNAL MEDICINE

## 2018-11-05 PROCEDURE — G8427 DOCREV CUR MEDS BY ELIG CLIN: HCPCS | Performed by: INTERNAL MEDICINE

## 2018-11-05 PROCEDURE — 1036F TOBACCO NON-USER: CPT | Performed by: INTERNAL MEDICINE

## 2018-11-05 PROCEDURE — G8484 FLU IMMUNIZE NO ADMIN: HCPCS | Performed by: INTERNAL MEDICINE

## 2018-11-05 PROCEDURE — 1101F PT FALLS ASSESS-DOCD LE1/YR: CPT | Performed by: INTERNAL MEDICINE

## 2018-11-05 PROCEDURE — 4040F PNEUMOC VAC/ADMIN/RCVD: CPT | Performed by: INTERNAL MEDICINE

## 2018-11-05 RX ORDER — AMLODIPINE BESYLATE 5 MG/1
5 TABLET ORAL DAILY
Qty: 90 TABLET | Refills: 1 | Status: SHIPPED | OUTPATIENT
Start: 2018-11-05 | End: 2019-05-01 | Stop reason: SDUPTHER

## 2018-11-05 ASSESSMENT — ENCOUNTER SYMPTOMS
SHORTNESS OF BREATH: 0
COUGH: 0
EYE PAIN: 0
DIARRHEA: 0
BLOOD IN STOOL: 0
COLOR CHANGE: 0
SORE THROAT: 0
CONSTIPATION: 0
WHEEZING: 0
PHOTOPHOBIA: 0
VOMITING: 0
NAUSEA: 0
ABDOMINAL PAIN: 0
EYE DISCHARGE: 0

## 2018-11-05 NOTE — PATIENT INSTRUCTIONS
Return To Clinic 2/11/2019. After Visit Summary  given and reviewed. --MA    It is very important for your care that you keep your appointment. If for some reason you are unable to keep your appointment it is equally important that you call our office at [phone] to cancel your appointment and reschedule. Failure to do so may result in your termination from our practice.

## 2018-11-05 NOTE — PROGRESS NOTES
Negative for dysuria, frequency and urgency. Musculoskeletal: Negative for arthralgias and myalgias. Skin: Negative for color change and rash. Neurological: Negative for dizziness, tremors, seizures, syncope, weakness, numbness and headaches. Psychiatric/Behavioral: Negative for agitation, behavioral problems, confusion, sleep disturbance and suicidal ideas. PHYSICAL EXAM:  Vitals:    11/05/18 1305   BP: 111/70   Site: Left Upper Arm   Position: Sitting   Cuff Size: Large Adult   Pulse: 63   Weight: 183 lb 3.2 oz (83.1 kg)   Height: 5' 5\" (1.651 m)     BP Readings from Last 3 Encounters:   11/05/18 111/70   07/19/18 110/62   06/18/18 127/71        Physical Exam   Constitutional: He is oriented to person, place, and time. No distress. HENT:   Head: Normocephalic and atraumatic. Right Ear: External ear normal.   Left Ear: External ear normal.   Nose: Nose normal.   Mouth/Throat: No oropharyngeal exudate. Eyes: Pupils are equal, round, and reactive to light. EOM are normal.   Neck: Normal range of motion. Neck supple. No thyromegaly present. Cardiovascular: Normal rate, regular rhythm, normal heart sounds and intact distal pulses. Pulmonary/Chest: Effort normal and breath sounds normal. No respiratory distress. He has no wheezes. Abdominal: Soft. Bowel sounds are normal. He exhibits no distension and no mass. There is no tenderness. Musculoskeletal: Normal range of motion. He exhibits no edema or tenderness. Neurological: He is alert and oriented to person, place, and time. No cranial nerve deficit. Skin: Skin is warm. No rash noted. He is not diaphoretic. No erythema.    Psychiatric: Judgment normal.         DIAGNOSTIC FINDINGS:  CBC:No results found for: WBC, HGB, PLT    BMP:    Lab Results   Component Value Date     02/28/2018    K 4.5 02/28/2018     02/28/2018    CO2 26 02/28/2018    BUN 23 02/28/2018    CREATININE 1.5 02/28/2018    GLUCOSE NEGATIVE 06/26/2018    GLUCOSE 86 02/28/2018       HEMOGLOBIN A1C: No results found for: LABA1C    FASTING LIPID PANEL:  Lab Results   Component Value Date    CHOL 188 11/21/2016    HDL 54 11/21/2016    TRIG 95 11/21/2016       ASSESSMENT AND PLAN:  Shamika Rock was seen today for 3 month follow-up, hypertension and shoulder pain. Diagnoses and all orders for this visit:    Essential hypertension  -     amLODIPine (NORVASC) 5 MG tablet; Take 1 tablet by mouth daily    Benign prostatic hyperplasia with nocturia    Osteoarthritis of knees    Mild intermittent asthma without complication    Plan:  Continue current medications. Medication due for refill completed today. Next visit will be in 3 months. FOLLOW UP AND INSTRUCTIONS:  · Return in about 3 months (around 2/5/2019) for HTN. · Shamika Rock received counseling on the following healthy behaviors: nutrition and exercise    · Discussed use, benefit, and side effects of prescribed medications. Barriers to medication compliance addressed. All patient questions answered. Pt voiced understanding. · Patient given educational materials - see patient instructions    Dulce Velazquez MD, ANNALISE, [address] [place] [address] Internal Medicine Associate  11/5/2018, 2:37 PM    This note is created with the assistance of a speech-recognition program. While intending to generate a document that actually reflects the content of thevisit, the document can still have some mistakes which may not have been identified and corrected by editing.

## 2018-11-21 DIAGNOSIS — R35.1 BENIGN PROSTATIC HYPERPLASIA WITH NOCTURIA: ICD-10-CM

## 2018-11-21 DIAGNOSIS — N40.1 BENIGN PROSTATIC HYPERPLASIA WITH NOCTURIA: ICD-10-CM

## 2018-11-22 RX ORDER — TAMSULOSIN HYDROCHLORIDE 0.4 MG/1
0.4 CAPSULE ORAL DAILY
Qty: 90 CAPSULE | Refills: 1 | Status: SHIPPED | OUTPATIENT
Start: 2018-11-22 | End: 2019-05-01 | Stop reason: SDUPTHER

## 2018-12-27 DIAGNOSIS — J45.20 MILD INTERMITTENT ASTHMA WITHOUT COMPLICATION: ICD-10-CM

## 2018-12-29 RX ORDER — MONTELUKAST SODIUM 10 MG/1
10 TABLET ORAL NIGHTLY
Qty: 90 TABLET | Refills: 1 | Status: SHIPPED | OUTPATIENT
Start: 2018-12-29 | End: 2019-05-01 | Stop reason: SDUPTHER

## 2018-12-31 DIAGNOSIS — J45.20 MILD INTERMITTENT ASTHMA WITHOUT COMPLICATION: ICD-10-CM

## 2019-02-11 ENCOUNTER — OFFICE VISIT (OUTPATIENT)
Dept: INTERNAL MEDICINE | Age: 79
End: 2019-02-11
Payer: MEDICARE

## 2019-02-11 VITALS
SYSTOLIC BLOOD PRESSURE: 101 MMHG | WEIGHT: 184.8 LBS | HEART RATE: 68 BPM | DIASTOLIC BLOOD PRESSURE: 62 MMHG | BODY MASS INDEX: 30.75 KG/M2

## 2019-02-11 DIAGNOSIS — M17.10 ARTHRITIS OF KNEE: ICD-10-CM

## 2019-02-11 DIAGNOSIS — I10 ESSENTIAL HYPERTENSION: Primary | ICD-10-CM

## 2019-02-11 DIAGNOSIS — R35.1 BENIGN PROSTATIC HYPERPLASIA WITH NOCTURIA: ICD-10-CM

## 2019-02-11 DIAGNOSIS — N40.1 BENIGN PROSTATIC HYPERPLASIA WITH NOCTURIA: ICD-10-CM

## 2019-02-11 DIAGNOSIS — G89.29 CHRONIC PAIN OF BOTH SHOULDERS: ICD-10-CM

## 2019-02-11 DIAGNOSIS — J45.20 MILD INTERMITTENT ASTHMA WITHOUT COMPLICATION: ICD-10-CM

## 2019-02-11 DIAGNOSIS — F41.0 ANXIETY ATTACK: ICD-10-CM

## 2019-02-11 DIAGNOSIS — M25.512 CHRONIC PAIN OF BOTH SHOULDERS: ICD-10-CM

## 2019-02-11 DIAGNOSIS — N52.8 OTHER MALE ERECTILE DYSFUNCTION: ICD-10-CM

## 2019-02-11 DIAGNOSIS — M25.511 CHRONIC PAIN OF BOTH SHOULDERS: ICD-10-CM

## 2019-02-11 PROCEDURE — G8484 FLU IMMUNIZE NO ADMIN: HCPCS | Performed by: INTERNAL MEDICINE

## 2019-02-11 PROCEDURE — 99211 OFF/OP EST MAY X REQ PHY/QHP: CPT | Performed by: INTERNAL MEDICINE

## 2019-02-11 PROCEDURE — 4040F PNEUMOC VAC/ADMIN/RCVD: CPT | Performed by: INTERNAL MEDICINE

## 2019-02-11 PROCEDURE — G8427 DOCREV CUR MEDS BY ELIG CLIN: HCPCS | Performed by: INTERNAL MEDICINE

## 2019-02-11 PROCEDURE — 99214 OFFICE O/P EST MOD 30 MIN: CPT | Performed by: INTERNAL MEDICINE

## 2019-02-11 PROCEDURE — 1123F ACP DISCUSS/DSCN MKR DOCD: CPT | Performed by: INTERNAL MEDICINE

## 2019-02-11 PROCEDURE — 1036F TOBACCO NON-USER: CPT | Performed by: INTERNAL MEDICINE

## 2019-02-11 PROCEDURE — 1101F PT FALLS ASSESS-DOCD LE1/YR: CPT | Performed by: INTERNAL MEDICINE

## 2019-02-11 PROCEDURE — G8417 CALC BMI ABV UP PARAM F/U: HCPCS | Performed by: INTERNAL MEDICINE

## 2019-02-11 RX ORDER — FINASTERIDE 5 MG/1
5 TABLET, FILM COATED ORAL DAILY
Qty: 90 TABLET | Refills: 1 | Status: SHIPPED | OUTPATIENT
Start: 2019-02-11 | End: 2019-05-01 | Stop reason: SDUPTHER

## 2019-02-11 RX ORDER — ACETAMINOPHEN 500 MG
500 TABLET ORAL EVERY 8 HOURS PRN
Qty: 120 TABLET | Refills: 2 | Status: SHIPPED | OUTPATIENT
Start: 2019-02-11 | End: 2019-05-01 | Stop reason: SDUPTHER

## 2019-02-11 RX ORDER — SILDENAFIL 50 MG/1
50 TABLET, FILM COATED ORAL
Qty: 4 TABLET | Refills: 2 | Status: SHIPPED | OUTPATIENT
Start: 2019-02-11 | End: 2019-05-01

## 2019-02-11 RX ORDER — LORAZEPAM 0.5 MG/1
0.5 TABLET ORAL EVERY 8 HOURS PRN
Qty: 5 TABLET | Refills: 0 | Status: SHIPPED | OUTPATIENT
Start: 2019-02-11 | End: 2019-03-13

## 2019-02-11 RX ORDER — INFLUENZA VACCINE, ADJUVANTED 15; 15; 15 UG/.5ML; UG/.5ML; UG/.5ML
INJECTION, SUSPENSION INTRAMUSCULAR
Refills: 0 | COMMUNITY
Start: 2019-01-10 | End: 2019-05-01

## 2019-02-11 RX ORDER — ATENOLOL 25 MG/1
25 TABLET ORAL DAILY
Qty: 90 TABLET | Refills: 1 | Status: SHIPPED | OUTPATIENT
Start: 2019-02-11 | End: 2019-05-01 | Stop reason: SDUPTHER

## 2019-02-11 ASSESSMENT — ENCOUNTER SYMPTOMS
EYE DISCHARGE: 0
BLOOD IN STOOL: 0
EYE PAIN: 0
WHEEZING: 0
SHORTNESS OF BREATH: 0
COUGH: 0
CONSTIPATION: 0
NAUSEA: 0
SORE THROAT: 0
PHOTOPHOBIA: 0
COLOR CHANGE: 0
VOMITING: 0
ABDOMINAL PAIN: 0
DIARRHEA: 0

## 2019-02-11 ASSESSMENT — PATIENT HEALTH QUESTIONNAIRE - PHQ9
SUM OF ALL RESPONSES TO PHQ9 QUESTIONS 1 & 2: 0
1. LITTLE INTEREST OR PLEASURE IN DOING THINGS: 0
SUM OF ALL RESPONSES TO PHQ QUESTIONS 1-9: 0
SUM OF ALL RESPONSES TO PHQ QUESTIONS 1-9: 0
2. FEELING DOWN, DEPRESSED OR HOPELESS: 0

## 2019-04-16 LAB
ANION GAP SERPL CALCULATED.3IONS-SCNC: 6 MMOL/L (ref 4–12)
BUN BLDV-MCNC: 21 MG/DL (ref 5–27)
CALCIUM SERPL-MCNC: 9.3 MG/DL (ref 8.5–10.5)
CHLORIDE BLD-SCNC: 110 MMOL/L (ref 98–109)
CO2: 29 MMOL/L (ref 22–32)
CREAT SERPL-MCNC: 0.95 MG/DL (ref 0.6–1.3)
EGFR AFRICAN AMERICAN: >60 ML/MIN/1.73SQ.M
EGFR IF NONAFRICAN AMERICAN: >60 ML/MIN/1.73SQ.M
GLUCOSE: 86 MG/DL (ref 65–99)
POTASSIUM SERPL-SCNC: 4.3 MMOL/L (ref 3.5–5)
SODIUM BLD-SCNC: 145 MMOL/L (ref 134–146)

## 2019-05-01 ENCOUNTER — OFFICE VISIT (OUTPATIENT)
Dept: INTERNAL MEDICINE | Age: 79
End: 2019-05-01
Payer: MEDICARE

## 2019-05-01 VITALS
DIASTOLIC BLOOD PRESSURE: 73 MMHG | HEART RATE: 91 BPM | BODY MASS INDEX: 30.62 KG/M2 | SYSTOLIC BLOOD PRESSURE: 128 MMHG | WEIGHT: 184 LBS

## 2019-05-01 DIAGNOSIS — Z23 NEED FOR PROPHYLACTIC VACCINATION AND INOCULATION AGAINST VARICELLA: ICD-10-CM

## 2019-05-01 DIAGNOSIS — F41.0 ANXIETY ATTACK: ICD-10-CM

## 2019-05-01 DIAGNOSIS — I10 ESSENTIAL HYPERTENSION: Primary | ICD-10-CM

## 2019-05-01 DIAGNOSIS — R35.1 BENIGN PROSTATIC HYPERPLASIA WITH NOCTURIA: ICD-10-CM

## 2019-05-01 DIAGNOSIS — M17.10 ARTHRITIS OF KNEE: ICD-10-CM

## 2019-05-01 DIAGNOSIS — J45.20 MILD INTERMITTENT ASTHMA WITHOUT COMPLICATION: ICD-10-CM

## 2019-05-01 DIAGNOSIS — N40.1 BENIGN PROSTATIC HYPERPLASIA WITH NOCTURIA: ICD-10-CM

## 2019-05-01 DIAGNOSIS — Z23 NEED FOR PROPHYLACTIC VACCINATION AGAINST DIPHTHERIA-TETANUS-PERTUSSIS (DTP): ICD-10-CM

## 2019-05-01 PROCEDURE — G8417 CALC BMI ABV UP PARAM F/U: HCPCS | Performed by: INTERNAL MEDICINE

## 2019-05-01 PROCEDURE — G8427 DOCREV CUR MEDS BY ELIG CLIN: HCPCS | Performed by: INTERNAL MEDICINE

## 2019-05-01 PROCEDURE — 99214 OFFICE O/P EST MOD 30 MIN: CPT | Performed by: INTERNAL MEDICINE

## 2019-05-01 PROCEDURE — 1036F TOBACCO NON-USER: CPT | Performed by: INTERNAL MEDICINE

## 2019-05-01 PROCEDURE — 4040F PNEUMOC VAC/ADMIN/RCVD: CPT | Performed by: INTERNAL MEDICINE

## 2019-05-01 PROCEDURE — 99211 OFF/OP EST MAY X REQ PHY/QHP: CPT | Performed by: INTERNAL MEDICINE

## 2019-05-01 PROCEDURE — 1123F ACP DISCUSS/DSCN MKR DOCD: CPT | Performed by: INTERNAL MEDICINE

## 2019-05-01 RX ORDER — MONTELUKAST SODIUM 10 MG/1
10 TABLET ORAL NIGHTLY
Qty: 90 TABLET | Refills: 1 | Status: SHIPPED | OUTPATIENT
Start: 2019-05-01 | End: 2019-12-03 | Stop reason: SDUPTHER

## 2019-05-01 RX ORDER — AMLODIPINE BESYLATE 5 MG/1
5 TABLET ORAL DAILY
Qty: 90 TABLET | Refills: 1 | Status: SHIPPED | OUTPATIENT
Start: 2019-05-01 | End: 2019-12-03

## 2019-05-01 RX ORDER — ATENOLOL 25 MG/1
25 TABLET ORAL DAILY
Qty: 90 TABLET | Refills: 1 | Status: SHIPPED | OUTPATIENT
Start: 2019-05-01 | End: 2019-12-03 | Stop reason: SDUPTHER

## 2019-05-01 RX ORDER — LORAZEPAM 0.5 MG/1
0.5 TABLET ORAL 2 TIMES DAILY PRN
Qty: 10 TABLET | Refills: 0 | Status: SHIPPED | OUTPATIENT
Start: 2019-05-01 | End: 2019-05-31

## 2019-05-01 RX ORDER — ACETAMINOPHEN 500 MG
500 TABLET ORAL EVERY 8 HOURS PRN
Qty: 90 TABLET | Refills: 5 | Status: SHIPPED | OUTPATIENT
Start: 2019-05-01

## 2019-05-01 RX ORDER — FINASTERIDE 5 MG/1
5 TABLET, FILM COATED ORAL DAILY
Qty: 90 TABLET | Refills: 1 | Status: SHIPPED | OUTPATIENT
Start: 2019-05-01 | End: 2019-12-03 | Stop reason: SDUPTHER

## 2019-05-01 RX ORDER — TAMSULOSIN HYDROCHLORIDE 0.4 MG/1
0.4 CAPSULE ORAL DAILY
Qty: 90 CAPSULE | Refills: 1 | Status: SHIPPED | OUTPATIENT
Start: 2019-05-01 | End: 2019-11-07 | Stop reason: SDUPTHER

## 2019-05-01 ASSESSMENT — ENCOUNTER SYMPTOMS
NAUSEA: 0
WHEEZING: 0
PHOTOPHOBIA: 0
EYE PAIN: 0
DIARRHEA: 0
COLOR CHANGE: 0
CONSTIPATION: 0
ABDOMINAL PAIN: 0
COUGH: 0
BLOOD IN STOOL: 0
SHORTNESS OF BREATH: 0
SORE THROAT: 0
VOMITING: 0
EYE DISCHARGE: 0

## 2019-05-01 NOTE — PROGRESS NOTES
[facility]  [facility] [address] [facility]  [address] [facility] [address]  Dept: [phone]  Dept Fax: [phone]    Office Progress/Follow Up Note  Date ofpatient's visit: 5/1/2019  Patient's Name:  Alexis Pena YOB: 1940            Patient Care Team:  Dulce Orona MD as PCP - General (Internal Medicine)  Dulce Orona MD as PCP - [facility] Attributed Provider  ================================================================    REASON FOR VISIT/CHIEF COMPLAINT:  Hypertension    HISTORY OF PRESENTING ILLNESS:  History was obtained from: patient. Yariel don 66 y.o. is here for a follow-up visit. Overall patient is doing well. He doesn't have any new complaints today. He tells me that he is traveling to [facility] for his son's wedding and ask for Ativan to help with anxiety attack. He doesn't have any depression. He has essential hypertension which is controlled with Norvasc and atenolol. He also has BPH and takes finasteride and Flomax. His asthma is controlled with IV and albuterol which he uses about once a week as needed. He is due for medication refills today. Problem list, medications and blood work reviewed       Patient Active Problem List   Diagnosis    Osteoarthritis of knees    Benign prostatic hyperplasia with nocturia    Essential hypertension    Mild intermittent asthma without complication       Health Maintenance Due   Topic Date Due    DTaP/Tdap/Td vaccine (1 - Tdap) 07/10/1959    Shingles Vaccine (1 of 2) 07/10/1990       Allergies   Allergen Reactions    Ibuprofen          Current Outpatient Medications   Medication Sig Dispense Refill    Tdap (ADACEL) 5-2-15.5 LF-MCG/0.5 injection Inject 0.5 mLs into the muscle once for 1 dose 0.5 mL 0    zoster recombinant adjuvanted vaccine (SHINGRIX) 50 MCG/0.5ML SUSR injection Inject 0.5 mLs into the muscle once for 1 dose 50 MCG IM then repeat 2-6 months.  1 each 1    atenolol (TENORMIN) 25 MG tablet Take 1 tablet by mouth daily 90 tablet 1    amLODIPine (NORVASC) 5 MG tablet Take 1 tablet by mouth daily 90 tablet 1    tamsulosin (FLOMAX) 0.4 MG capsule Take 1 capsule by mouth daily 90 capsule 1    montelukast (SINGULAIR) 10 MG tablet Take 1 tablet by mouth nightly 90 tablet 1    finasteride (PROSCAR) 5 MG tablet Take 1 tablet by mouth daily 90 tablet 1    fluticasone-salmeterol (ADVAIR DISKUS) 250-50 MCG/DOSE AEPB Inhale 1 puff into the lungs 2 times daily 3 each 5    acetaminophen (TYLENOL) 500 MG tablet Take 1 tablet by mouth every 8 hours as needed for Pain 90 tablet 5    LORazepam (ATIVAN) 0.5 MG tablet Take 1 tablet by mouth 2 times daily as needed for Anxiety for up to 30 days. 10 tablet 0    Minoxidil (ROGAINE MENS EX) Apply  topically.  vitamin B-12 (CYANOCOBALAMIN) 1000 MCG tablet Take 1,000 mcg by mouth daily Takes 1/2 tablet daily      Multiple Vitamins-Minerals (COMPLETE SENIOR PO) Take by mouth Takes 1/2 tablet daily      Selenium 200 MCG TABS Take  by mouth. Half tab daily       vitamin D (ERGOCALCIFEROL) 400 UNITS CAPS Take 400 Units by mouth daily Takes 1/2 tablet daily      SAW PALMETTO by Does not apply route.  calcium & magnesium carbonates (MYLANTA) per tablet Take  by mouth daily. Half tab        No current facility-administered medications for this visit. Social History     Tobacco Use    Smoking status: Never Smoker    Smokeless tobacco: Never Used   Substance Use Topics    Alcohol use: Yes     Alcohol/week: 2.4 oz     Types: 4 Shots of liquor per week    Drug use: No       Family History   Problem Relation Age of Onset    Cancer Mother 80        colon    Heart Disease Mother     Heart Disease Father     Other Father 70        ALS        REVIEW OF SYSTEMS:  Review of Systems   Constitutional: Negative for fatigue and fever. HENT: Negative for congestion and sore throat.     Eyes: Negative for photophobia, pain, discharge FINDINGS:  CBC:No results found for: WBC, HGB, PLT    BMP:    Lab Results   Component Value Date     04/15/2019    K 4.3 04/15/2019     04/15/2019    CO2 29 04/15/2019    BUN 21 04/15/2019    CREATININE 0.95 04/15/2019    GLUCOSE 86 04/15/2019       HEMOGLOBIN A1C: No results found for: LABA1C    FASTING LIPID PANEL:  Lab Results   Component Value Date    CHOL 188 11/21/2016    HDL 54 11/21/2016    TRIG 95 11/21/2016       ASSESSMENT AND PLAN:  Danisha Garrison was seen today for hypertension. Diagnoses and all orders for this visit:    Essential hypertension  -     atenolol (TENORMIN) 25 MG tablet; Take 1 tablet by mouth daily  -     amLODIPine (NORVASC) 5 MG tablet; Take 1 tablet by mouth daily    Need for prophylactic vaccination against diphtheria-tetanus-pertussis (DTP)  -     Tdap (ADACEL) 5-2-15.5 LF-MCG/0.5 injection; Inject 0.5 mLs into the muscle once for 1 dose    Need for prophylactic vaccination and inoculation against varicella  -     zoster recombinant adjuvanted vaccine [facility]) 50 MCG/0.5ML SUSR injection; Inject 0.5 mLs into the muscle once for 1 dose 50 MCG IM then repeat 2-6 months. Benign prostatic hyperplasia with nocturia  -     tamsulosin (FLOMAX) 0.4 MG capsule; Take 1 capsule by mouth daily  -     finasteride (PROSCAR) 5 MG tablet; Take 1 tablet by mouth daily    Mild intermittent asthma without complication  -     montelukast (SINGULAIR) 10 MG tablet; Take 1 tablet by mouth nightly  -     fluticasone-salmeterol (ADVAIR DISKUS) 250-50 MCG/DOSE AEPB; Inhale 1 puff into the lungs 2 times daily    Osteoarthritis of knees  -     acetaminophen (TYLENOL) 500 MG tablet; Take 1 tablet by mouth every 8 hours as needed for Pain    Anxiety attack  -     LORazepam (ATIVAN) 0.5 MG tablet; Take 1 tablet by mouth 2 times daily as needed for Anxiety for up to 30 days. FOLLOW UP AND INSTRUCTIONS:  · Return in about 6 months (around 11/1/2019) for HTN.     · Danisha Garrison received counseling on the following healthy behaviors: nutrition and exercise    · Discussed use, benefit, and side effects of prescribed medications. Barriers to medication compliance addressed. All patient questions answered. Pt voiced understanding. · Patient given educational materials - see patient instructions    Dulce Mancera M.D., F.A.C.P. Internal Medicine  5/1/2019, 4:32 PM    This note is created with the assistance of a speech-recognition program. While intending to generate a document that actually reflects the content of thevisit, the document can still have some mistakes which may not have been identified and corrected by editing.

## 2019-06-24 DIAGNOSIS — I10 ESSENTIAL HYPERTENSION: ICD-10-CM

## 2019-06-24 NOTE — TELEPHONE ENCOUNTER
Escribe request for Norvasc . Please escribe if appropriate      Next Visit Date:  None currently. Note to pharmacy to have patient contact the office to schedule appointment.   Last seen : 05/17/2019       Health Maintenance   Topic Date Due    DTaP/Tdap/Td vaccine (1 - Tdap) 07/10/1959    Shingles Vaccine (1 of 2) 07/10/1990    Annual Wellness Visit (AWV)  02/22/2017    Potassium monitoring  04/16/2020    Creatinine monitoring  04/16/2020    Flu vaccine  Completed    Pneumococcal 65+ years Vaccine  Completed       No results found for: LABA1C          ( goal A1C is < 7)   No results found for: LABMICR  LDL Calculated (mg/dL)   Date Value   11/21/2016 115       (goal LDL is <100)   AST (U/L)   Date Value   02/28/2018 22     ALT (U/L)   Date Value   02/28/2018 19     BUN (mg/dL)   Date Value   04/15/2019 21     BP Readings from Last 3 Encounters:   05/01/19 128/73   02/11/19 101/62   11/05/18 111/70          (goal 120/80)          Patient Active Problem List:     Osteoarthritis of knees     Benign prostatic hyperplasia with nocturia     Essential hypertension     Mild intermittent asthma without complication

## 2019-06-27 RX ORDER — AMLODIPINE BESYLATE 5 MG/1
TABLET ORAL
Qty: 90 TABLET | Refills: 1 | Status: SHIPPED | OUTPATIENT
Start: 2019-06-27 | End: 2019-12-03 | Stop reason: SDUPTHER

## 2019-07-22 DIAGNOSIS — J45.20 MILD INTERMITTENT ASTHMA WITHOUT COMPLICATION: ICD-10-CM

## 2019-07-22 NOTE — TELEPHONE ENCOUNTER
Request for Singulair. Next Visit Date:  No future appointments.     Health Maintenance   Topic Date Due    DTaP/Tdap/Td vaccine (1 - Tdap) 07/10/1959    Shingles Vaccine (1 of 2) 07/10/1990    Annual Wellness Visit (AWV)  07/10/2003    Flu vaccine (1) 09/01/2019    Potassium monitoring  04/16/2020    Creatinine monitoring  04/16/2020    Pneumococcal 65+ years Vaccine  Completed       No results found for: LABA1C          ( goal A1C is < 7)   No results found for: LABMICR  LDL Calculated (mg/dL)   Date Value   11/21/2016 115       (goal LDL is <100)   AST (U/L)   Date Value   02/28/2018 22     ALT (U/L)   Date Value   02/28/2018 19     BUN (mg/dL)   Date Value   04/15/2019 21     BP Readings from Last 3 Encounters:   05/01/19 128/73   02/11/19 101/62   11/05/18 111/70          (goal 120/80)    All Future Testing planned in [facility]  Lab Frequency Next Occurrence         Patient Active Problem List:     Osteoarthritis of knees     Benign prostatic hyperplasia with nocturia     Essential hypertension     Mild intermittent asthma without complication

## 2019-07-23 RX ORDER — MONTELUKAST SODIUM 10 MG/1
TABLET ORAL
Qty: 90 TABLET | Refills: 1 | Status: SHIPPED | OUTPATIENT
Start: 2019-07-23 | End: 2019-12-03

## 2019-11-07 DIAGNOSIS — N40.1 BENIGN PROSTATIC HYPERPLASIA WITH NOCTURIA: ICD-10-CM

## 2019-11-07 DIAGNOSIS — R35.1 BENIGN PROSTATIC HYPERPLASIA WITH NOCTURIA: ICD-10-CM

## 2019-11-07 RX ORDER — TAMSULOSIN HYDROCHLORIDE 0.4 MG/1
CAPSULE ORAL
Qty: 90 CAPSULE | Refills: 1 | Status: SHIPPED | OUTPATIENT
Start: 2019-11-07 | End: 2019-11-18 | Stop reason: SDUPTHER

## 2019-11-18 DIAGNOSIS — N40.1 BENIGN PROSTATIC HYPERPLASIA WITH NOCTURIA: ICD-10-CM

## 2019-11-18 DIAGNOSIS — R35.1 BENIGN PROSTATIC HYPERPLASIA WITH NOCTURIA: ICD-10-CM

## 2019-11-18 RX ORDER — TAMSULOSIN HYDROCHLORIDE 0.4 MG/1
0.4 CAPSULE ORAL DAILY
Qty: 7 CAPSULE | Refills: 0 | Status: SHIPPED | OUTPATIENT
Start: 2019-11-18 | End: 2019-12-03 | Stop reason: SDUPTHER

## 2019-12-03 ENCOUNTER — OFFICE VISIT (OUTPATIENT)
Dept: INTERNAL MEDICINE | Age: 79
End: 2019-12-03
Payer: MEDICARE

## 2019-12-03 VITALS
DIASTOLIC BLOOD PRESSURE: 61 MMHG | BODY MASS INDEX: 29.82 KG/M2 | SYSTOLIC BLOOD PRESSURE: 94 MMHG | HEIGHT: 65 IN | WEIGHT: 179 LBS | HEART RATE: 54 BPM

## 2019-12-03 DIAGNOSIS — J45.20 INTERMITTENT ASTHMA, WELL CONTROLLED: ICD-10-CM

## 2019-12-03 DIAGNOSIS — J33.9 NASAL POLYPS: ICD-10-CM

## 2019-12-03 DIAGNOSIS — M79.605 LEFT LEG PAIN: ICD-10-CM

## 2019-12-03 DIAGNOSIS — I10 ESSENTIAL HYPERTENSION: Primary | ICD-10-CM

## 2019-12-03 DIAGNOSIS — M67.441 GANGLION CYST OF TENDON SHEATH OF RIGHT HAND: ICD-10-CM

## 2019-12-03 DIAGNOSIS — Z23 NEED FOR TDAP VACCINATION: ICD-10-CM

## 2019-12-03 DIAGNOSIS — L30.9 HAND DERMATITIS: ICD-10-CM

## 2019-12-03 DIAGNOSIS — N40.1 BENIGN PROSTATIC HYPERPLASIA WITH NOCTURIA: ICD-10-CM

## 2019-12-03 DIAGNOSIS — R35.1 BENIGN PROSTATIC HYPERPLASIA WITH NOCTURIA: ICD-10-CM

## 2019-12-03 PROCEDURE — 1123F ACP DISCUSS/DSCN MKR DOCD: CPT | Performed by: NURSE PRACTITIONER

## 2019-12-03 PROCEDURE — G8427 DOCREV CUR MEDS BY ELIG CLIN: HCPCS | Performed by: NURSE PRACTITIONER

## 2019-12-03 PROCEDURE — 99214 OFFICE O/P EST MOD 30 MIN: CPT | Performed by: NURSE PRACTITIONER

## 2019-12-03 PROCEDURE — 99211 OFF/OP EST MAY X REQ PHY/QHP: CPT | Performed by: NURSE PRACTITIONER

## 2019-12-03 PROCEDURE — 1036F TOBACCO NON-USER: CPT | Performed by: NURSE PRACTITIONER

## 2019-12-03 PROCEDURE — 4040F PNEUMOC VAC/ADMIN/RCVD: CPT | Performed by: NURSE PRACTITIONER

## 2019-12-03 PROCEDURE — G8484 FLU IMMUNIZE NO ADMIN: HCPCS | Performed by: NURSE PRACTITIONER

## 2019-12-03 PROCEDURE — G8417 CALC BMI ABV UP PARAM F/U: HCPCS | Performed by: NURSE PRACTITIONER

## 2019-12-03 RX ORDER — LANOLIN ALCOHOL/MO/W.PET/CERES
3 CREAM (GRAM) TOPICAL NIGHTLY PRN
COMMUNITY

## 2019-12-03 RX ORDER — UBIDECARENONE 100 MG
100 CAPSULE ORAL DAILY
COMMUNITY

## 2019-12-03 RX ORDER — AMLODIPINE BESYLATE 5 MG/1
TABLET ORAL
Qty: 90 TABLET | Refills: 1 | Status: SHIPPED | OUTPATIENT
Start: 2019-12-03 | End: 2020-04-15 | Stop reason: SDUPTHER

## 2019-12-03 RX ORDER — TAMSULOSIN HYDROCHLORIDE 0.4 MG/1
0.4 CAPSULE ORAL DAILY
Qty: 90 CAPSULE | Refills: 1 | Status: SHIPPED | OUTPATIENT
Start: 2019-12-03 | End: 2020-05-04

## 2019-12-03 RX ORDER — ATENOLOL 25 MG/1
25 TABLET ORAL DAILY
Qty: 90 TABLET | Refills: 1 | Status: SHIPPED | OUTPATIENT
Start: 2019-12-03 | End: 2020-12-07 | Stop reason: SDUPTHER

## 2019-12-03 RX ORDER — ALBUTEROL SULFATE 90 UG/1
AEROSOL, METERED RESPIRATORY (INHALATION)
Qty: 1 INHALER | Refills: 6 | Status: SHIPPED | OUTPATIENT
Start: 2019-12-03

## 2019-12-03 RX ORDER — FINASTERIDE 5 MG/1
5 TABLET, FILM COATED ORAL DAILY
Qty: 90 TABLET | Refills: 1 | Status: SHIPPED | OUTPATIENT
Start: 2019-12-03 | End: 2020-04-15 | Stop reason: SDUPTHER

## 2019-12-03 RX ORDER — MONTELUKAST SODIUM 10 MG/1
10 TABLET ORAL NIGHTLY
Qty: 90 TABLET | Refills: 1 | Status: SHIPPED | OUTPATIENT
Start: 2019-12-03 | End: 2021-01-19 | Stop reason: SDUPTHER

## 2019-12-03 RX ORDER — DIAPER,BRIEF,INFANT-TODD,DISP
EACH MISCELLANEOUS
Qty: 1 TUBE | Refills: 1 | Status: SHIPPED | OUTPATIENT
Start: 2019-12-03 | End: 2019-12-10

## 2019-12-03 RX ORDER — ASCORBIC ACID 500 MG
250 TABLET ORAL DAILY
COMMUNITY

## 2019-12-03 ASSESSMENT — ENCOUNTER SYMPTOMS
ORTHOPNEA: 0
BLURRED VISION: 0
SHORTNESS OF BREATH: 0

## 2020-01-27 ENCOUNTER — TELEPHONE (OUTPATIENT)
Dept: INTERNAL MEDICINE | Age: 80
End: 2020-01-27

## 2020-01-28 ENCOUNTER — TELEPHONE (OUTPATIENT)
Dept: INTERNAL MEDICINE | Age: 80
End: 2020-01-28

## 2020-01-28 NOTE — TELEPHONE ENCOUNTER
Pt is calling re; his appt on 2/3-want to know if he can have referral to ortho without having to come in .

## 2020-02-03 ENCOUNTER — OFFICE VISIT (OUTPATIENT)
Dept: INTERNAL MEDICINE | Age: 80
End: 2020-02-03
Payer: MEDICARE

## 2020-02-03 VITALS
DIASTOLIC BLOOD PRESSURE: 64 MMHG | SYSTOLIC BLOOD PRESSURE: 108 MMHG | BODY MASS INDEX: 32.31 KG/M2 | HEIGHT: 62 IN | HEART RATE: 58 BPM | WEIGHT: 175.6 LBS

## 2020-02-03 PROCEDURE — 4040F PNEUMOC VAC/ADMIN/RCVD: CPT | Performed by: NURSE PRACTITIONER

## 2020-02-03 PROCEDURE — G8482 FLU IMMUNIZE ORDER/ADMIN: HCPCS | Performed by: NURSE PRACTITIONER

## 2020-02-03 PROCEDURE — 99213 OFFICE O/P EST LOW 20 MIN: CPT | Performed by: NURSE PRACTITIONER

## 2020-02-03 PROCEDURE — 1036F TOBACCO NON-USER: CPT | Performed by: NURSE PRACTITIONER

## 2020-02-03 PROCEDURE — G8417 CALC BMI ABV UP PARAM F/U: HCPCS | Performed by: NURSE PRACTITIONER

## 2020-02-03 PROCEDURE — G8427 DOCREV CUR MEDS BY ELIG CLIN: HCPCS | Performed by: NURSE PRACTITIONER

## 2020-02-03 PROCEDURE — 1123F ACP DISCUSS/DSCN MKR DOCD: CPT | Performed by: NURSE PRACTITIONER

## 2020-02-03 PROCEDURE — 99211 OFF/OP EST MAY X REQ PHY/QHP: CPT | Performed by: NURSE PRACTITIONER

## 2020-02-03 RX ORDER — PREDNISONE 20 MG/1
20 TABLET ORAL 2 TIMES DAILY
Qty: 10 TABLET | Refills: 0 | Status: SHIPPED | OUTPATIENT
Start: 2020-02-03 | End: 2020-02-08

## 2020-02-03 ASSESSMENT — PATIENT HEALTH QUESTIONNAIRE - PHQ9
2. FEELING DOWN, DEPRESSED OR HOPELESS: 0
SUM OF ALL RESPONSES TO PHQ QUESTIONS 1-9: 0
SUM OF ALL RESPONSES TO PHQ QUESTIONS 1-9: 0
SUM OF ALL RESPONSES TO PHQ9 QUESTIONS 1 & 2: 0
1. LITTLE INTEREST OR PLEASURE IN DOING THINGS: 0

## 2020-02-03 NOTE — PATIENT INSTRUCTIONS
Patient was added to wait list. Patient will be contacted by office to schedule upcoming appointment with the physician. After Visit Summary given and reviewed. The medication list included in this document is our record of what you are currently taking, including any changes that were made at today's visit. If you find any differences when compared to your medications at home, or have any questions that were not answered at your visit, please contact the office. It is very important for your care that you keep your appointment. If for some reason you are unable to keep your appointment, it is equally important that you call our office at [phone] to cancel your appointment and reschedule. Failure to do so may result in your termination from our practice. Referral for Sports Medicine sent to [address] [place] [address] Street. Specialty office will contact patient for appointment. A copy of referral with contact information and address given to patient.  Patient should contact specialist office if no contact has been made to patient within 1 week.     JYOCE Oswald

## 2020-02-05 NOTE — PROGRESS NOTES
Subjective:      Patient ID: Mirtha Wagner is a 78 y.o. male. Leg Pain    The incident occurred more than 1 week ago. The incident occurred at home. Injury mechanism: lifting a tire. The pain is present in the left leg and left hip. The quality of the pain is described as aching and shooting. The pain is moderate. The pain has been worsening since onset. Pertinent negatives include no inability to bear weight, loss of motion, loss of sensation, muscle weakness, numbness or tingling. He reports no foreign bodies present. The symptoms are aggravated by movement and weight bearing. He has tried rest, elevation and NSAIDs for the symptoms. The treatment provided no relief. Review of Systems   Neurological: Negative for tingling and numbness. All other systems reviewed and are negative. Objective:   Physical Exam  Vitals signs and nursing note reviewed. Constitutional:       Appearance: Normal appearance. HENT:      Head: Normocephalic and atraumatic. Right Ear: External ear normal.      Left Ear: External ear normal.      Nose: Nose normal.   Eyes:      Conjunctiva/sclera: Conjunctivae normal.   Pulmonary:      Effort: Pulmonary effort is normal.   Musculoskeletal:      Left hip: He exhibits decreased range of motion. Neurological:      General: No focal deficit present. Mental Status: He is alert and oriented to person, place, and time. Psychiatric:         Mood and Affect: Mood normal.         Behavior: Behavior normal.         Thought Content: Thought content normal.         Judgment: Judgment normal.         Assessment/Plan:      1. Left leg pain    - Tanya Ponce, DO, Sports Medicine and Primary Care  Veteran  - predniSONE (DELTASONE) 20 MG tablet; Take 1 tablet by mouth 2 times daily for 5 days Take with food  Dispense: 10 tablet;  Refill: [address], APRN - [facility]

## 2020-02-10 ENCOUNTER — TELEPHONE (OUTPATIENT)
Dept: INTERNAL MEDICINE | Age: 80
End: 2020-02-10

## 2020-02-10 RX ORDER — PREDNISONE 20 MG/1
20 TABLET ORAL 2 TIMES DAILY
Qty: 6 TABLET | Refills: 0 | Status: SHIPPED | OUTPATIENT
Start: 2020-02-10 | End: 2020-02-13

## 2020-02-10 NOTE — TELEPHONE ENCOUNTER
Patient calling requesting a few more doses of prednisone - he states he has an appt with Dr. Russ Silva (sports medicine) tomorrow. If appropriate, he is asking for script to be sent to [facility] on [address]. I do not see medication to pend for you.

## 2020-02-11 ENCOUNTER — OFFICE VISIT (OUTPATIENT)
Dept: ORTHOPEDIC SURGERY | Age: 80
End: 2020-02-11
Payer: MEDICARE

## 2020-02-11 VITALS
BODY MASS INDEX: 32.33 KG/M2 | HEART RATE: 77 BPM | DIASTOLIC BLOOD PRESSURE: 85 MMHG | WEIGHT: 175.71 LBS | HEIGHT: 62 IN | SYSTOLIC BLOOD PRESSURE: 136 MMHG

## 2020-02-11 PROCEDURE — G8427 DOCREV CUR MEDS BY ELIG CLIN: HCPCS | Performed by: FAMILY MEDICINE

## 2020-02-11 PROCEDURE — 1036F TOBACCO NON-USER: CPT | Performed by: FAMILY MEDICINE

## 2020-02-11 PROCEDURE — 1123F ACP DISCUSS/DSCN MKR DOCD: CPT | Performed by: FAMILY MEDICINE

## 2020-02-11 PROCEDURE — G8417 CALC BMI ABV UP PARAM F/U: HCPCS | Performed by: FAMILY MEDICINE

## 2020-02-11 PROCEDURE — 99203 OFFICE O/P NEW LOW 30 MIN: CPT | Performed by: FAMILY MEDICINE

## 2020-02-11 PROCEDURE — 4040F PNEUMOC VAC/ADMIN/RCVD: CPT | Performed by: FAMILY MEDICINE

## 2020-02-11 PROCEDURE — G8482 FLU IMMUNIZE ORDER/ADMIN: HCPCS | Performed by: FAMILY MEDICINE

## 2020-02-11 NOTE — PROGRESS NOTES
Sports Medicine Consultation     CHIEF COMPLAINT:  Hip Pain (2mo left leg and hip pain. was picking up a wheel/tire assembly 8/10 achy. using OTC topicals ) and Leg Pain      HPI:  Sammy Blanco is a 78y.o. year old male who is a new patient being seen for regarding new problem left femur/leg pain. The pain has been present for 2 month(s). The patient recalls pain after lifting snow tires injury. The patient has tried naproxen, lidocaine, heat, ice did help but chiro without improvement. The pain is described as achy to sharp. There is  pain on weightbearing. There is is not painful popping and clicking in the hip or knee. The hip or knee does not catch or lock. The leg has not given out. It occ stiff upon arising from sitting. It is  painful lying on the affected side. he has a past medical history of Asthma, Hypertension, Nonsmoker, Osteoarthritis, Right rotator cuff tear, Right rotator cuff tear, Sinusitis, chronic, Sinusitis, chronic, Trigger finger, and Trigger finger. he has a past surgical history that includes joint replacement (Bilateral). family history includes Cancer (age of onset: 80) in his mother; Heart Disease in his father and mother; Other (age of onset: 70) in his father. Social History     Socioeconomic History    Marital status: Single     Spouse name: Not on file    Number of children: Not on file    Years of education: Not on file    Highest education level: Not on file   Occupational History    Not on file   Social Needs    Financial resource strain: Not on file    Food insecurity:     Worry: Not on file     Inability: Not on file    Transportation needs:     Medical: Not on file     Non-medical: Not on file   Tobacco Use    Smoking status: Never Smoker    Smokeless tobacco: Never Used   Substance and Sexual Activity    Alcohol use:  Yes     Alcohol/week: 4.0 standard drinks     Types: 4 Shots of liquor per week    Drug use: No    Sexual tablet by mouth daily 90 tablet 1    montelukast (SINGULAIR) 10 MG tablet Take 1 tablet by mouth nightly 90 tablet 1    finasteride (PROSCAR) 5 MG tablet Take 1 tablet by mouth daily 90 tablet 1    fluticasone-salmeterol (ADVAIR DISKUS) 250-50 MCG/DOSE AEPB Inhale 1 puff into the lungs 2 times daily 3 each 1    albuterol sulfate HFA (PROAIR HFA) 108 (90 Base) MCG/ACT inhaler 2 puffs inhaled every 4-6 hours as needed for cough, wheeze, chest tightness or shortness of breath 1 Inhaler 6    fluticasone (VERAMYST) 27.5 MCG/SPRAY nasal spray 2 sprays to each nostril once daily 1 Bottle 11    acetaminophen (TYLENOL) 500 MG tablet Take 1 tablet by mouth every 8 hours as needed for Pain 90 tablet 5    Minoxidil (ROGAINE MENS EX) Apply  topically.  vitamin B-12 (CYANOCOBALAMIN) 1000 MCG tablet Take 1,000 mcg by mouth daily Takes 1/2 tablet daily      Multiple Vitamins-Minerals (COMPLETE SENIOR PO) Take by mouth Takes 1/2 tablet daily      Selenium 200 MCG TABS Take  by mouth. Half tab daily       vitamin D (ERGOCALCIFEROL) 400 UNITS CAPS Take 400 Units by mouth daily Takes 1/2 tablet daily      calcium & magnesium carbonates (MYLANTA) per tablet Take  by mouth daily. Half tab       SAW PALMETTO by Does not apply route. No current facility-administered medications for this visit. Allergies:  heis allergic to ibuprofen. ROS:  CV:  Denies chest pain; palpitations; shortness of breath; swelling of feet, ankles; and loss of consciousness. CON: Denies fever and dizziness. ENT:  Denies hearing loss / ringing, ear infections hoarseness, and swallowing problems. RESP:  Denies chronic cough, spitting up blood, and asthma/wheezing. GI: Denies abdominal pain, change in bowel habits, nausea or vomiting, and blood in stools. :  Denies frequent urination, burning or painful urination, blood in the urine, and bladder incontinence.   NEURO:  Denies headache, memory loss, sleep disturbance, and Iliotibial band syndrome of left side M76.32 Ambulatory referral to Physical Therapy   . We discussed the various treatment alternatives including anti-inflammatory medications, physical therapy, injections, further imaging studies and as a last resort surgery. At this point I do think the patient has more of an iliotibial band syndrome presentation that can be addressed with formal physical therapy we will set him up for a course of formal physical therapy and he will follow-up with me otherwise as needed patient voiced understanding and agreement with this plan    Return to clinic in No follow-ups on file. Edvin Damon     Please be aware portions of this note were completed using voice recognition software and unforeseen errors may have occurred    Electronically signed by Dann Ybarra DO, FAOASM  on 2/11/20 at 10:14 AM

## 2020-02-13 ENCOUNTER — HOSPITAL ENCOUNTER (OUTPATIENT)
Dept: PHYSICAL THERAPY | Facility: CLINIC | Age: 80
Setting detail: THERAPIES SERIES
Discharge: HOME | End: 2020-02-13
Payer: MEDICARE

## 2020-02-13 PROCEDURE — 97140 MANUAL THERAPY 1/> REGIONS: CPT

## 2020-02-13 PROCEDURE — 97161 PT EVAL LOW COMPLEX 20 MIN: CPT

## 2020-02-13 NOTE — CONSULTS
[facility] Fall Risk Assessment    Patient Name:  Mandi Calabrese  : 1940        Risk Factor Scale  Score   History of Falls [] Yes  [x] No 25  0 0   Secondary Diagnosis [] Yes  [x] No 15  0 0   Ambulatory Aid [] Furniture  [] Crutches/cane/walker  [x] None/bedrest/wheelchair/nurse 30  15  0 0   IV/Heparin Lock [] Yes  [x] No 20  0 0   Gait/Transferring [] Impaired  [] Weak  [x] Normal/bedrest/immobile 20  10  0 0   Mental Status [] Forgets limitations  [x] Oriented to own ability 15  0 0      Total:0     Based on the Assessment score: check the appropriate box.     [x]  No intervention needed   Low =   Score of 0-24    []  Use standard prevention interventions Moderate =  Score of 24-44   [] Give patient handout and discuss fall prevention strategies   [] Establish goal of education for patient/family RE: fall prevention strategies    []  Use high risk prevention interventions High = Score of 45 and higher   [] Give patient handout and discuss fall prevention strategies   [] Establish goal of education for patient/family Re: fall prevention strategies   [] Discuss lifeline / other resources    Electronically signed by:   Sadie Holcomb, PT  Date: 2020

## 2020-02-13 NOTE — CONSULTS
[] [address]  [address]  [phone]  F: [phone] [x] [address]  [facility] [address]   [address]  P: [phone]  F: [phone] [] [place]  [address] [facility]  P: [phone]  F: [phone] [] [address]  [facility]   [address]   [place]: [phone]  F: [phone]      Physical Therapy Lower Extremity Evaluation    Date:  2020  Patient: Ernesto Engle  : 1940  MRN: [id number]  Physician: Eleazar Brittle, D.O. Insurance: [employer]  Medical Diagnosis: Left hip ITB syndrome Rehab Codes: U84.129T45.270  Onset date: Dec 2019  Next Dr's appt. : tbd    Subjective:   CC/HPI: (onset date): Pt is a 78 y.o male who reports onset of Left hip and thigh pain in Dec 2019 while lifting a tire/wheel. He has tried chiro for 2 weeks without improvement. No improvement with oral steroids. Current pain rating 8-9/10. Aggravating factors include standing and walking; pain is relieved with sitting.        PMHx: [] Unremarkable [] Diabetes [x] HTN  [] Pacemaker   [] MI/Heart Problems [] Cancer [x] Arthritis [x] Other: B TKA              [x] Refer to full medical chart  In EPIC       Comorbidities:   [] Obesity [] Dialysis  [x] Other: partial tear R RTC   [] Asthma/COPD [] Dementia [] Other:   [] Stroke [] Sleep apnea [] Other:   [] Vascular disease [] Rheumatic disease [] Other:     Tests: [x] X-Ray:   Narrative   3 view left hip radiographs taken in the office today demonstrate mild    age-appropriate degenerative changes of the left hip without fracture    dislocations there is degenerative changes of the pubic symphysis and SI    joint without any acute fractures or dislocations       Assessment no acute processes of the left hip degenerative changes are    noted RONAKs neg  []   PF     Piriformis   []   INV     Dorothys +  []   EVER     Talor Tilt   []        Pat-Fem Grind   []       OBSERVATION No Deficit Deficit Not Tested Comments   Posture       Forward Head [] [] []    Rounded Shoulders [] [] []    Kyphosis [] [] []    Lordosis [] [] []    Lateral Shift [] [] []    Scoliosis [] [] []    Iliac Crest [] [] []    PSIS [] [] []    ASIS [] [] []    Genu Valgus [] [] []    Genu Varus [] [] []    Genu Recurvatum [] [] []    Pronation [] [] []    Supination [] [] []    Leg Length Discrp [] [] []    Slumped Sitting [] [] []    Palpation [] [x] [] Left ITB   Sensation [] [] []    Edema [] [] []    Neurological [] [] []    Patellar Mobility [] [] []    Patellar Orientation [] [] []    Gait [] [x] [] Analysis:mildly antalgic gait       FUNCTION Normal Difficult Unable   Sitting [] [] []   Standing [] [] []   Ambulation [] [] []   Groom/Dress [] [] []   Lift/Carry [] [] []   Stairs [] [] []   Bending [] [] []   Squat [] [] []   Kneel [] [] []     BALANCE/PROPRIOCEPTION              [x] Not tested   Single leg stance       R                     L                                PAIN   Eyes open                             Sec. Sec                  . []    Eyes closed                          Sec. Sec                  . []        FUNCTIONAL TESTS PAIN NO PAIN COMMENTS   Step Test 4 [] []    6 [] []    8 [] []    Squat [] []      Functional Test: Lower extremity functional index (LEFI) Score:    74% functionally impaired     Comments: Had been doing silver sneakers but hasn't for last month as he can't walk the distance     Lumbar spine assessment: extension and Left side bend increase lateral hip pain; Left SLR positive (? symptoms with hip IR and ankle DF)    Assessment:  Pt would benefit from skilled PT interventions to decrease pain, increase strength, ROM, and overall functional mobility for improved quality of life. Problems:    [x] ?  Pain: Dexamethasone Sodium Phosphate 4mg/ml     with iontophoresis treatments. Pt is not allergic. Frequency:  2 x/week for 12 visits        Todays Treatment:  Modalities: none today but may begin HP to Left hip/thigh next sesison  Precautions:  Exercises:  Exercise Reps/ Time Weight/ Level Comments         Muscle stick to ITB 10 min     ITB stretch-strap x5 30\"    HS stretch-strap x5 30\"          Other:    Specific Instructions for next treatment: hip ROM/strengthening; begin flexion based lumbar ROM program (similar to spinal stenosis protocol)      Evaluation Complexity:  History (Personal factors, comorbidities) [] 0 [x] 1-2 [] 3+   Exam (limitations, restrictions) [x] 1-2 [] 3 [] 4+   Clinical presentation (progression) [x] Stable [] Evolving  [] Unstable   Decision Making [x] Low [] Moderate [] High    [x] Low Complexity [] Moderate Complexity [] High Complexity       Treatment Charges: Mins Units   [x] Evaluation       [x]  Low       []  Moderate       []  High 45 1   []  Modalities     [x]  Ther Exercise 5 0   [x]  Manual Therapy 10 1   []  Ther Activities     []  Aquatics     []  Vasocompression     []  Other      Medicare total: $ 104.52    TOTAL TREATMENT TIME:60 min  Time in: 2:05p   Time Out:3:05p    Electronically signed by: Ally Downing PT        Physician Signature:________________________________Date:__________________  By signing above or cosigning this note, I have reviewed this plan of care and certify a need for medically necessary rehabilitation services.      *PLEASE SIGN ABOVE AND FAX BACK ALL PAGES*

## 2020-02-18 ENCOUNTER — HOSPITAL ENCOUNTER (OUTPATIENT)
Dept: PHYSICAL THERAPY | Facility: CLINIC | Age: 80
Setting detail: THERAPIES SERIES
Discharge: HOME | End: 2020-02-18
Payer: MEDICARE

## 2020-02-18 PROCEDURE — 97140 MANUAL THERAPY 1/> REGIONS: CPT

## 2020-02-18 PROCEDURE — 97110 THERAPEUTIC EXERCISES: CPT

## 2020-02-18 NOTE — FLOWSHEET NOTE
Specific Instructions for subsequent treatments: advance as able.        Time In:3:35            Time Out: 4:35    Electronically signed by:  Nicholas Mckinley PTA

## 2020-02-20 ENCOUNTER — HOSPITAL ENCOUNTER (OUTPATIENT)
Dept: PHYSICAL THERAPY | Facility: CLINIC | Age: 80
Setting detail: THERAPIES SERIES
Discharge: HOME | End: 2020-02-20
Payer: MEDICARE

## 2020-02-20 PROCEDURE — 97140 MANUAL THERAPY 1/> REGIONS: CPT

## 2020-02-20 PROCEDURE — 97110 THERAPEUTIC EXERCISES: CPT

## 2020-02-24 ENCOUNTER — HOSPITAL ENCOUNTER (OUTPATIENT)
Dept: PHYSICAL THERAPY | Facility: CLINIC | Age: 80
Setting detail: THERAPIES SERIES
Discharge: HOME | End: 2020-02-24
Payer: MEDICARE

## 2020-02-24 PROCEDURE — 97110 THERAPEUTIC EXERCISES: CPT

## 2020-02-24 PROCEDURE — 97140 MANUAL THERAPY 1/> REGIONS: CPT

## 2020-02-24 NOTE — FLOWSHEET NOTE
Cues to not go deep     High knee march  10xea     Step ups       Step downs                   Other:  Specific Instructions for next treatment: hip ROM/strengthening; begin flexion based lumbar ROM program (similar to spinal stenosis protocol)       Treatment Charges: Mins Units   [x]  Modalities HP 8 0   [x]  Ther Exercise 40 2   [x]  Manual Therapy 10 1   []  Ther Activities     []  Aquatics     []  Vasocompression     []  Other     Total Treatment time 50 3   Estimated Medicare total as of 2/24/2020: $ 306.06    Assessment: [x] Progressing toward goals. Pt arrived with less pain than usual. Pt able to tolerate all mat exercises with little to no increase in pain. Pt did have relief again with muscle stick. Pt declined a full 10 minutes of HP due to it not seeming to help him at home. [] No change. [] Other:  [] Patient would continue to benefit from skilled physical therapy services in order to: reduce pain, increase strength and ROM all to improve daily function. Problems at evaluation on 02/13:    [x]? ? Pain: Left hip 8-9/10  [x]? ? ROM: Left hip- ITB tightness  [x]? ? Strength: glute med weakness  [x]? ? Function:LEFI 21/80                   STG: (to be met in 7 treatments)  1. ? Pain:reduce L hip and ITB pain to 5/10 for ADL's  2. ? ROM: improve flexibility of ITB/TFL  3. ? Strength:increase strength of Left hip musculature  4. ? Function:improve LEFI score for walking 2 blocks to little difficulty  5. Independent with Home Exercise Program     LTG: (to be met in 12 treatments)  1. Able to resume exercise with silver sneakers 3x/week  2. Improve LEFI score for going up/down stairs to little difficulty  3. Reduce Left hip/thigh pain to 3/10                    Patient goals: perfection    Pt. Education:  [x] Yes  [] No  [] Reviewed Prior HEP/Ed  Method of Education: [x] Verbal  [x] Demo  [] Written  Comprehension of Education:  [x] Verbalizes understanding. [x] Demonstrates understanding.   [] Needs review. [] Demonstrates/verbalizes HEP/Ed previously given. Plan: [x] Continue for 12 visits toward short and long term goals. [x] Specific Instructions for subsequent treatments: advance as able.        Time In:3:00 pm          Time Out: 4:10 pm    Electronically signed by:  Nicholas Mckinley PTA

## 2020-02-27 ENCOUNTER — HOSPITAL ENCOUNTER (OUTPATIENT)
Dept: PHYSICAL THERAPY | Facility: CLINIC | Age: 80
Setting detail: THERAPIES SERIES
Discharge: HOME | End: 2020-02-27
Payer: MEDICARE

## 2020-02-27 PROCEDURE — 97110 THERAPEUTIC EXERCISES: CPT

## 2020-02-27 NOTE — FLOWSHEET NOTE
Abduction x10  Pain -not today   clamshells x10  Pain - not today               Standing       3 way hip B 15xea peach Pain increased with ex on L   Squats  10x  Cues to not go deep     High knee march  10xea     Step ups       Step downs                   Other:  Specific Instructions for next treatment: hip ROM/strengthening; begin flexion based lumbar ROM program (similar to spinal stenosis protocol)       Treatment Charges: Mins Units   [x]  Modalities HP 10 0   [x]  Ther Exercise 30 2   [x]  Manual Therapy     []  Ther Activities     []  Aquatics     []  Vasocompression     []  Other     Total Treatment time 30 2   Estimated Medicare total as of 2/27/2020: $ 351.06    Assessment: [x] Progressing toward goals. Focused treatment on finding a comfortable pelvic position for the pt. Rotation to the L seemed to be the best position with pt reporting pain in the LB and no symptoms in the leg. [] No change. [] Other:  [] Patient would continue to benefit from skilled physical therapy services in order to: reduce pain, increase strength and ROM all to improve daily function. Problems at evaluation on 02/13:    [x]? ? Pain: Left hip 8-9/10  [x]? ? ROM: Left hip- ITB tightness  [x]? ? Strength: glute med weakness  [x]? ? Function:LEFI 21/80                   STG: (to be met in 7 treatments)  1. ? Pain:reduce L hip and ITB pain to 5/10 for ADL's  2. ? ROM: improve flexibility of ITB/TFL  3. ? Strength:increase strength of Left hip musculature  4. ? Function:improve LEFI score for walking 2 blocks to little difficulty  5. Independent with Home Exercise Program     LTG: (to be met in 12 treatments)  1. Able to resume exercise with silver sneakers 3x/week  2. Improve LEFI score for going up/down stairs to little difficulty  3. Reduce Left hip/thigh pain to 3/10                    Patient goals: perfection    Pt.  Education:  [x] Yes  [] No  [] Reviewed Prior HEP/Ed  Method of Education: [x] Verbal  [x] Demo  [] Written  Comprehension of Education:  [x] Verbalizes understanding. [x] Demonstrates understanding. [] Needs review. [] Demonstrates/verbalizes HEP/Ed previously given. Plan: [x] Continue for 12 visits toward short and long term goals. [x] Specific Instructions for subsequent treatments: advance as able.        Time In: 2:55pm          Time Out: 3:50 pm    Electronically signed by:  Michelle Sy PTA

## 2020-03-02 ENCOUNTER — HOSPITAL ENCOUNTER (OUTPATIENT)
Dept: PHYSICAL THERAPY | Facility: CLINIC | Age: 80
Setting detail: THERAPIES SERIES
Discharge: HOME | End: 2020-03-02
Payer: MEDICARE

## 2020-03-02 PROCEDURE — 97110 THERAPEUTIC EXERCISES: CPT

## 2020-03-02 NOTE — FLOWSHEET NOTE
[] [facility]  [address]  [phone]  F: [phone] [x] [address]  [facility]   [address]  P: [phone]  F: [phone] [] [address]  [address] [facility]  P: [phone]  F: [phone] [] [address]  [facility]   [address]   [place]: [phone]  F: [phone]      Physical Therapy Daily Treatment Note    Date:  3/2/2020  Patient Name:  Mohini Pascal    :  1940  MRN: [id number]  Physician: Len Sicard, D.O. Insurance: Med[employer]/BC  Medical Diagnosis: Left hip ITB syndrome  Rehab Codes: S69.393B71.060  Onset date: Dec 2019                       Next Dr's appt. : tbd  Visit# / total visits:  ; Progress note for Medicare patient due at visit 10     Cancels/No Shows: 0/0    Subjective:    Pain:  [x] Yes  [] No Location: Left hip  Pain Rating: (0-10 scale) 4-7/10  Pain altered Tx:  [x] No  [] Yes  Action:    Comments: Pt states his pain was not improved from recent changes made to exercise program    Objective:  Modalities: HP (with extra layer) to LB supine with legs elevated 10min   Precautions:  Exercises: bolded done   Exercise Reps/ Time Weight/ Level Comments   NuStep 10' lv3          Supine      In 90/90 position  3min  No change   In supine with no LE elevation  x  Slightly better/centralized, did not last     SKTC stretch 5x5\"     DKTC 5x5\"     LTR 10x5\" ea  Better to the L   Pelvic tilt  10x5\"  Trying to find a comfort position, no changes           SLR 2x5     Supine march x20ea           piriformis                  hooklying abduction 2x10 lime    hooklying adduction 15x3\" ball          Sidelying      On L with R leg flexed over R> Open book 3min  3/2- c/o Left hip pain after 3 min   Musicle stick to ITB 10 min Abduction x10  Pain -not today   clamshells x10                 Standing       3 way hip B 15xea green Pain increased with ex on L   Squats \"mini\"  10x  Cues to not go deep     High knee march  10xea     Step ups       Step downs                   Other:  Specific Instructions for next treatment: hip ROM/strengthening; begin flexion based lumbar ROM program (similar to spinal stenosis protocol)       Treatment Charges: Mins Units   []  Modalities      [x]  Ther Exercise 45 3   [x]  Manual Therapy     []  Ther Activities     []  Aquatics     []  Vasocompression     []  Other     Total Treatment time 45 3   Estimated Medicare total as of 3/2/2020: $ 427.22    Assessment: [x] Progressing toward goals. [x] No change. Tender to palpation over Left greater trochanter : Left hip weakness persists; easily fatigued with standing exercises; need to continue exercises that improve strength and endurance     [] Other:  [x] Patient would continue to benefit from skilled physical therapy services in order to: reduce pain, increase strength and ROM all to improve daily function. Problems at evaluation on 02/13:    [x]? ? Pain: Left hip 8-9/10  [x]? ? ROM: Left hip- ITB tightness  [x]? ? Strength: glute med weakness  [x]? ? Function:LEFI 21/80                   STG: (to be met in 7 treatments)  1. ? Pain:reduce L hip and ITB pain to 5/10 for ADL's  2. ? ROM: improve flexibility of ITB/TFL  3. ? Strength:increase strength of Left hip musculature  4. ? Function:improve LEFI score for walking 2 blocks to little difficulty  5. Independent with Home Exercise Program     LTG: (to be met in 12 treatments)  1. Able to resume exercise with silver sneakers 3x/week  2. Improve LEFI score for going up/down stairs to little difficulty  3. Reduce Left hip/thigh pain to 3/10                    Patient goals: perfection    Pt.  Education:  [x] Yes  [] No  [] Reviewed Prior HEP/Ed  Method of Education: [x] Verbal  [x] Demo  []

## 2020-03-05 ENCOUNTER — HOSPITAL ENCOUNTER (OUTPATIENT)
Dept: PHYSICAL THERAPY | Facility: CLINIC | Age: 80
Setting detail: THERAPIES SERIES
End: 2020-03-05
Payer: MEDICARE

## 2020-03-09 ENCOUNTER — HOSPITAL ENCOUNTER (OUTPATIENT)
Dept: PHYSICAL THERAPY | Facility: CLINIC | Age: 80
Setting detail: THERAPIES SERIES
Discharge: HOME | End: 2020-03-09
Payer: MEDICARE

## 2020-03-09 PROCEDURE — 97110 THERAPEUTIC EXERCISES: CPT

## 2020-03-09 NOTE — FLOWSHEET NOTE
[] [address] [facility]  [address]  [phone]  F: [phone] [x] [address]  [facility]   [address]  P: [phone]  F: [phone] [] [address]  [address] [facility]  P: [phone]  F: [phone] [] [address]  [facility]   [address]   [place]: [phone]  F: [phone]      Physical Therapy Daily Treatment Note     Date:  3/9/2020  Patient Name:  Pauly Aguilar    :  1940  MRN: [id number]  Physician: Viviane Beasley D.O. Insurance: Med[employer]  Medical Diagnosis: Left hip ITB syndrome  Rehab Codes: [id number]  Onset date: Dec 2019                       Next Dr's appt. : tbd  Visit# / total visits:  ; Progress note for Medicare patient due at visit 10     Cancels/No Shows: 0/0    Subjective:    Pain:  [x] Yes  [] No Location: Left hip  Pain Rating: (0-10 scale)  6-7/10  Pain altered Tx:  [x] No  [] Yes  Action:    Comments: Pt states pain in Left lateral hip/thigh is 6-7/10     Objective:  Modalities: CP to Left lateral thigh- supine with legs elevated 10min   Precautions:  Exercises: bolded done 3/9  Exercise Reps/ Time Weight/ Level Comments   NuStep 10' lv3          Supine      In 90/90 position  3min  No change   In supine with no LE elevation  x  Slightly better/centralized, did not last     SKTC stretch 5x5\"     DKTC 5x5\"     LTR 10x5\" ea  Better to the L   Pelvic tilt  10x5\"  Trying to find a comfort position, no changes           SLR 2x5     Supine march x20ea 2#          piriformis x3ea                 hooklying abduction 2x10 lime    hooklying adduction 15x3\" ball          Sidelying      On L with R leg flexed over R> Open book 3min     Musicle stick to ITB 10 min     Abduction x10  Pain -not today   clamshells x10 Standing       3 way hip B 10xea     Squats \"mini\"  10x  Cues for shoulder width stance   High knee march  10xea     Step ups       Step downs                   Other:  Specific Instructions for next treatment: hip ROM/strengthening; begin flexion based lumbar ROM program (similar to spinal stenosis protocol)       Treatment Charges: Mins Units   []  Modalities      [x]  Ther Exercise 45 3   [x]  Manual Therapy     []  Ther Activities     []  Aquatics     []  Vasocompression     []  Other     Total Treatment time 45 3   Estimated Medicare total as of 3/9/2020: $ 503.38    Assessment: [] Progressing toward goals. [x] No change. Left leg weakness persists; however able to complete march in place with 2#; pt appears to get some relief with Left side lying position; trial of CP post Ex (with legs elevated)          [] Other:  [x] Patient would continue to benefit from skilled physical therapy services in order to: reduce pain, increase strength and ROM all to improve daily function. Problems at evaluation on 02/13:    [x]? ? Pain: Left hip 8-9/10  [x]? ? ROM: Left hip- ITB tightness  [x]? ? Strength: glute med weakness  [x]? ? Function:LEFI 21/80                   STG: (to be met in 7 treatments)  1. ? Pain:reduce L hip and ITB pain to 5/10 for ADL's  2. ? ROM: improve flexibility of ITB/TFL  3. ? Strength:increase strength of Left hip musculature  4. ? Function:improve LEFI score for walking 2 blocks to little difficulty  5. Independent with Home Exercise Program     LTG: (to be met in 12 treatments)  1. Able to resume exercise with silver sneakers 3x/week  2. Improve LEFI score for going up/down stairs to little difficulty  3. Reduce Left hip/thigh pain to 3/10                    Patient goals: perfection    Pt. Education:  [x] Yes  [] No  [] Reviewed Prior HEP/Ed  Method of Education: [x] Verbal  [x] Demo  [] Written  Comprehension of Education:  [x] Verbalizes understanding.   [x] Demonstrates

## 2020-03-12 ENCOUNTER — HOSPITAL ENCOUNTER (OUTPATIENT)
Dept: PHYSICAL THERAPY | Facility: CLINIC | Age: 80
Setting detail: THERAPIES SERIES
Discharge: HOME | End: 2020-03-12
Payer: MEDICARE

## 2020-03-12 PROCEDURE — 97110 THERAPEUTIC EXERCISES: CPT

## 2020-03-12 NOTE — FLOWSHEET NOTE
[] [address]  [address]  [phone]  F: [phone] [x] [address]  [facility] 36   [address]  P: [phone]  F: [phone] [] [address]  [address] [facility]  P: [phone]  F: [phone] [] [address]  [facility]   [address]   [place]: [phone]  F: [phone]      Physical Therapy Daily Treatment Note     Date:  3/12/2020  Patient Name:  Clementina Banks    :  1940  MRN: [id number]  Physician: Raysa Anton D.O. Insurance: MediCare/BC  Medical Diagnosis: Left hip ITB syndrome  Rehab Codes: [id number]  Onset date: Dec 2019                       Next Dr's appt. : tbd  Visit# / total visits:  ; Progress note for Medicare patient due at visit 10     Cancels/No Shows: 0/0    Subjective:    Pain:  [x] Yes  [] No Location: Left hip  Pain Rating: (0-10 scale)  6-7/10  Pain altered Tx:  [x] No  [] Yes  Action:    Comments: Pt pain has been consistent.       Objective:  Modalities: CP to Left lateral thigh- supine with legs elevated 10min   Precautions:  Exercises: bolded done 3/9  Exercise Reps/ Time Weight/ Level Comments   NuStep 6' lv3          Supine      In 90/90 position  3min  No change   In supine with no LE elevation  x  Slightly better/centralized, did not last     SKTC stretch 5x5\"     DKTC 5x5\"     Piriformis stretch 5x15\"  Added 3/12/2020   LTR 10x5\" ea  Better to the L   Pelvic tilt  10x5\"  Trying to find a comfort position, no changes           SLR 2x5     Supine march x20ea 2#                            hooklying abduction 2x10 lime    hooklying adduction 15x3\" ball          Sidelying      On L with R leg flexed over R> Open book 3min     Musicle stick to ITB 10 min     Abduction x10  Pain -not today   grant x10                 Standing       3 way hip B 10xea     Squats \"mini\"  10x  Cues for shoulder width stance   High knee march  10xea     Step ups       Step downs                   Other:  Specific Instructions for next treatment: hip ROM/strengthening; begin flexion based lumbar ROM program (similar to spinal stenosis protocol)       Treatment Charges: Mins Units   []  Modalities      [x]  Ther Exercise 42 3   [x]  Manual Therapy     []  Ther Activities     []  Aquatics     []  Vasocompression     []  Other     Total Treatment time 42 3   Estimated Medicare total as of 3/12/2020: $ 568.12    Assessment: [] Progressing toward goals. [x] No change. Able to complete exercises as charted with complaints of pain in the L hip and lateral thigh. A good stretch felt with the manual piriformis stretch, minimal pain relief. Pt declined HP or CP at the end of the session today to \"see how it goes\". [] Other:  [x] Patient would continue to benefit from skilled physical therapy services in order to: reduce pain, increase strength and ROM all to improve daily function. Problems at evaluation on 02/13:    [x]? ? Pain: Left hip 8-9/10  [x]? ? ROM: Left hip- ITB tightness  [x]? ? Strength: glute med weakness  [x]? ? Function:LEFI 21/80                   STG: (to be met in 7 treatments)  1. ? Pain:reduce L hip and ITB pain to 5/10 for ADL's  2. ? ROM: improve flexibility of ITB/TFL  3. ? Strength:increase strength of Left hip musculature  4. ? Function:improve LEFI score for walking 2 blocks to little difficulty  5. Independent with Home Exercise Program     LTG: (to be met in 12 treatments)  1. Able to resume exercise with silver sneakers 3x/week  2. Improve LEFI score for going up/down stairs to little difficulty  3. Reduce Left hip/thigh pain to 3/10                    Patient goals: perfection    Pt.  Education:  [x] Yes  [] No  [] Reviewed Prior HEP/Ed  Method of Education: [x] Verbal  [x] Demo  [x] Written

## 2020-03-16 ENCOUNTER — HOSPITAL ENCOUNTER (OUTPATIENT)
Dept: PHYSICAL THERAPY | Facility: CLINIC | Age: 80
Setting detail: THERAPIES SERIES
Discharge: HOME | End: 2020-03-16
Payer: MEDICARE

## 2020-03-16 PROCEDURE — 97110 THERAPEUTIC EXERCISES: CPT

## 2020-03-19 ENCOUNTER — HOSPITAL ENCOUNTER (OUTPATIENT)
Dept: PHYSICAL THERAPY | Facility: CLINIC | Age: 80
Setting detail: THERAPIES SERIES
Discharge: HOME | End: 2020-03-19
Payer: MEDICARE

## 2020-03-19 PROCEDURE — 97110 THERAPEUTIC EXERCISES: CPT

## 2020-03-19 NOTE — FLOWSHEET NOTE
progress made                    Patient goals: perfection    Pt. Education:  [x] Yes  [] No  [] Reviewed Prior HEP/Ed  Method of Education: [x] Verbal  [x] Demo  [x] Written  Multiple piriformis stretches   Comprehension of Education:  [x] Verbalizes understanding. [x] Demonstrates understanding. [] Needs review. [] Demonstrates/verbalizes HEP/Ed previously given. Plan: [x] Continue for 3 visits toward short and long term goals. [x] Specific Instructions for subsequent treatments: advance as able.        Time In: 4:00pm          Time Out: 4:50pm    Electronically signed by:  Tyree Haley PTA

## 2020-03-23 ENCOUNTER — APPOINTMENT (OUTPATIENT)
Dept: PHYSICAL THERAPY | Facility: CLINIC | Age: 80
End: 2020-03-23
Payer: MEDICARE

## 2020-03-26 ENCOUNTER — APPOINTMENT (OUTPATIENT)
Dept: PHYSICAL THERAPY | Facility: CLINIC | Age: 80
End: 2020-03-26
Payer: MEDICARE

## 2020-04-15 RX ORDER — AMLODIPINE BESYLATE 5 MG/1
TABLET ORAL
Qty: 90 TABLET | Refills: 1 | Status: SHIPPED | OUTPATIENT
Start: 2020-04-15 | End: 2021-01-19 | Stop reason: SDUPTHER

## 2020-04-15 RX ORDER — FINASTERIDE 5 MG/1
5 TABLET, FILM COATED ORAL DAILY
Qty: 90 TABLET | Refills: 1 | Status: SHIPPED | OUTPATIENT
Start: 2020-04-15 | End: 2021-01-08

## 2020-04-30 NOTE — DISCHARGE SUMMARY
[] [facility]) - [facility]  [address]  [phone]  F: [phone] [x] [address]  [facility]   [address]  P: [phone]  F: [phone] [] [address]  [address] [facility]  P: [phone]  F: [phone] [] [address]  [facility]   [address]   [place]: [phone]  F: [phone]      Physical Therapy Discharge Note      Date: 2020      Patient: Rivera Wood  : 1940  MRN: [id number]    [address] [place] [address], D. O.                       Insurance: Med[employer]Care/[employer]  Medical Diagnosis: Left hip ITB syndrome  Rehab Codes: M25.552M79.652  Onset date: Dec 9599                               Next Dr's appt. : tbd  Visit# / total visits: 10/12 ; Progress note for Medicare patient due at visit 10                                 Cancels/No Shows: 00  Date of initial visit: 2020               Date of final visit: 3/19/2020       Objective:  Refer to progress note of 3/16/20  Pt was placed on Hold d/t Covid-; he was contacted on 20 and reports compliance with HEP; he was encouraged to follow up with Dr Yajaira Ashford as needed      Treatment to Date:  [x] Therapeutic Exercise    [] Modalities:  [] Therapeutic Activity    [] Ultrasound  [] Electrical Stimulation  [] Gait Training     [] Massage       [] Lumbar/Cervical Traction  [] Neuromuscular Re-education [x] Cold/hotpack [] Iontophoresis: 4 mg/mL  [x] Instruction in Home Exercise Program                     Dexamethasone Sodium  [] Manual Therapy             Phosphate 40-80 mAmin  [] Aquatic Therapy                   [] Vasocompression/    [] Other:             Game Ready    Discharge Status:         [x] Pt to continue exercise/home instructions independently.        Electronically signed by Bubba Magdaleno, PT on 2020

## 2020-05-04 RX ORDER — TAMSULOSIN HYDROCHLORIDE 0.4 MG/1
CAPSULE ORAL
Qty: 90 CAPSULE | Refills: 1 | Status: SHIPPED | OUTPATIENT
Start: 2020-05-04 | End: 2020-10-30

## 2020-05-12 ENCOUNTER — OFFICE VISIT (OUTPATIENT)
Dept: ORTHOPEDIC SURGERY | Age: 80
End: 2020-05-12
Payer: MEDICARE

## 2020-05-12 VITALS
BODY MASS INDEX: 29.16 KG/M2 | WEIGHT: 175 LBS | HEIGHT: 65 IN | DIASTOLIC BLOOD PRESSURE: 75 MMHG | HEART RATE: 64 BPM | TEMPERATURE: 98 F | SYSTOLIC BLOOD PRESSURE: 123 MMHG

## 2020-05-12 PROCEDURE — 1036F TOBACCO NON-USER: CPT | Performed by: FAMILY MEDICINE

## 2020-05-12 PROCEDURE — 99213 OFFICE O/P EST LOW 20 MIN: CPT | Performed by: FAMILY MEDICINE

## 2020-05-12 PROCEDURE — G8427 DOCREV CUR MEDS BY ELIG CLIN: HCPCS | Performed by: FAMILY MEDICINE

## 2020-05-12 PROCEDURE — 1123F ACP DISCUSS/DSCN MKR DOCD: CPT | Performed by: FAMILY MEDICINE

## 2020-05-12 PROCEDURE — 4040F PNEUMOC VAC/ADMIN/RCVD: CPT | Performed by: FAMILY MEDICINE

## 2020-05-12 PROCEDURE — G8417 CALC BMI ABV UP PARAM F/U: HCPCS | Performed by: FAMILY MEDICINE

## 2020-05-18 ENCOUNTER — HOSPITAL ENCOUNTER (OUTPATIENT)
Dept: PHYSICAL THERAPY | Facility: CLINIC | Age: 80
Setting detail: THERAPIES SERIES
Discharge: HOME | End: 2020-05-18
Payer: MEDICARE

## 2020-05-18 PROCEDURE — 97161 PT EVAL LOW COMPLEX 20 MIN: CPT
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
## 2020-05-19 NOTE — CONSULTS
Carrie Fall Risk Assessment    Patient Name:  Royer Cook  : 1940        Risk Factor Scale  Score   History of Falls [] Yes  [x] No 25  0 0   Secondary Diagnosis [] Yes  [x] No 15  0 0   Ambulatory Aid [] Furniture  [] Crutches/cane/walker  [x] None/bedrest/wheelchair/nurse 30  15  0 0   IV/Heparin Lock [] Yes  [x] No 20  0 0   Gait/Transferring [] Impaired  [] Weak  [x] Normal/bedrest/immobile 20  10  0 0   Mental Status [] Forgets limitations  [x] Oriented to own ability 15  0 0      Total:0     Based on the Assessment score: check the appropriate box.     [x]  No intervention needed   Low =   Score of 0-24    []  Use standard prevention interventions Moderate =  Score of 24-44   [] Give patient handout and discuss fall prevention strategies   [] Establish goal of education for patient/family RE: fall prevention strategies    []  Use high risk prevention interventions High = Score of 45 and higher   [] Give patient handout and discuss fall prevention strategies   [] Establish goal of education for patient/family Re: fall prevention strategies   [] Discuss lifeline / other resources    Electronically signed by:   Dottie Lee PT  Date: 2020

## 2020-05-28 ENCOUNTER — HOSPITAL ENCOUNTER (OUTPATIENT)
Dept: PHYSICAL THERAPY | Facility: CLINIC | Age: 80
Setting detail: THERAPIES SERIES
Discharge: HOME | End: 2020-05-28
Payer: MEDICARE

## 2020-05-28 PROCEDURE — 97110 THERAPEUTIC EXERCISES: CPT

## 2020-06-02 ENCOUNTER — HOSPITAL ENCOUNTER (OUTPATIENT)
Dept: PHYSICAL THERAPY | Facility: CLINIC | Age: 80
Setting detail: THERAPIES SERIES
Discharge: HOME | End: 2020-06-02
Payer: MEDICARE

## 2020-06-04 ENCOUNTER — HOSPITAL ENCOUNTER (OUTPATIENT)
Dept: PHYSICAL THERAPY | Facility: CLINIC | Age: 80
Setting detail: THERAPIES SERIES
Discharge: HOME | End: 2020-06-04
Payer: MEDICARE

## 2020-06-04 PROCEDURE — 97110 THERAPEUTIC EXERCISES: CPT

## 2020-06-04 NOTE — FLOWSHEET NOTE
[] [address]  [address]  [phone]  F: [phone] [] [address]  [facility]   [address]  P: [phone]  F: [phone] [] [place]  [address] [facility]  P: [phone]  F: [phone] [] [address]  [facility]   [address]   [place]: [phone]  F: [phone]      Physical Therapy Daily Treatment Note    Date:  2020  Patient Name:  Adrianna Hill    :  1940  MRN: [id number]  [address] [facility], D. O.                       Insurance: MediCare  Medical Diagnosis: Left hip ITB syndrome/ Left lumbar radiculitis  Rehab Codes: M79.652, M25.552, M25.652, M54.5  Onset date: Dec 2019         Visit# / total visits: 3/12; Progress note for Medicare patient due at visit 10     Cancels/No Shows: 1/0    Subjective:    Pain:  [] Yes  [] No Location: LB/L LE Pain Rating: (0-10 scale) 3/10  Pain altered Tx:  [] No  [] Yes  Action:    Comments: Pt notes he has had the burning in his Left anterior thigh a few times daily; he reports compliance with home exercise program    Objective:  Modalities:   Precautions:  Exercises:  Exercise Reps/ Time Weight/ Level Comments   Nu Step 10min L4          mat      SLR x10  x5 0    Hip ABD x20 Blue tband Knees bent   Ball squeeze x20     Pelvic tilt x5  Improved  technique   March in place x15ea     bridging x15     [facility] x5ea 15\"          ITB strap stretch x3 20\"    Mod piriformis stretch x3 20\"          side      SLR x15 lime    clams x15 lime                standing      Hip ext x15ea 0    Hip ABD x15ea 0                Other:      Treatment Charges: Mins Units   []  Modalities     [x]  Ther Exercise 40 3   []  Manual Therapy     []  Ther Activities     []  Aquatics     []  Vasocompression     []  Other     Total

## 2020-06-11 ENCOUNTER — HOSPITAL ENCOUNTER (OUTPATIENT)
Dept: PHYSICAL THERAPY | Facility: CLINIC | Age: 80
Setting detail: THERAPIES SERIES
Discharge: HOME | End: 2020-06-11
Payer: MEDICARE

## 2020-06-11 PROCEDURE — 97110 THERAPEUTIC EXERCISES: CPT

## 2020-06-16 ENCOUNTER — OFFICE VISIT (OUTPATIENT)
Dept: INTERNAL MEDICINE | Age: 80
End: 2020-06-16
Payer: MEDICARE

## 2020-06-16 VITALS
HEIGHT: 65 IN | SYSTOLIC BLOOD PRESSURE: 94 MMHG | HEART RATE: 62 BPM | TEMPERATURE: 98.2 F | BODY MASS INDEX: 28.89 KG/M2 | DIASTOLIC BLOOD PRESSURE: 53 MMHG | WEIGHT: 173.4 LBS

## 2020-06-16 PROCEDURE — G8417 CALC BMI ABV UP PARAM F/U: HCPCS | Performed by: NURSE PRACTITIONER

## 2020-06-16 PROCEDURE — 1123F ACP DISCUSS/DSCN MKR DOCD: CPT | Performed by: NURSE PRACTITIONER

## 2020-06-16 PROCEDURE — 99214 OFFICE O/P EST MOD 30 MIN: CPT | Performed by: NURSE PRACTITIONER

## 2020-06-16 PROCEDURE — 4040F PNEUMOC VAC/ADMIN/RCVD: CPT | Performed by: NURSE PRACTITIONER

## 2020-06-16 PROCEDURE — 1036F TOBACCO NON-USER: CPT | Performed by: NURSE PRACTITIONER

## 2020-06-16 PROCEDURE — G8427 DOCREV CUR MEDS BY ELIG CLIN: HCPCS | Performed by: NURSE PRACTITIONER

## 2020-06-16 RX ORDER — IPRATROPIUM BROMIDE 21 UG/1
2 SPRAY, METERED NASAL 3 TIMES DAILY
Qty: 1 BOTTLE | Refills: 3 | Status: SHIPPED | OUTPATIENT
Start: 2020-06-16 | End: 2021-08-12

## 2020-06-18 ENCOUNTER — HOSPITAL ENCOUNTER (OUTPATIENT)
Dept: PHYSICAL THERAPY | Facility: CLINIC | Age: 80
Setting detail: THERAPIES SERIES
Discharge: HOME | End: 2020-06-18
Payer: MEDICARE

## 2020-06-18 PROCEDURE — 97110 THERAPEUTIC EXERCISES: CPT

## 2020-06-18 NOTE — FLOWSHEET NOTE
[] [address]  [address]  [phone]  F: [phone] [] [address]  [facility]   [address]  P: [phone]  F: [phone] [] [place]  [address] [facility]  P: [phone]  F: [phone] [] [address]  [facility]   [address]   [place]: [phone]  F: [phone]      Physical Therapy Daily Treatment Note    Date:  2020  Patient Name:  Heidi Gifford    :  1940  MRN: [id number]  [address] [facility], D[address] O[facility]                       Insurance: MediCare  Medical Diagnosis: Left hip ITB syndrome/ Left lumbar radiculitis  Rehab Codes: M79.652, M25.552, M25.652, M54.5  Onset date: Dec 2019         Visit# / total visits: ; Progress note for Medicare patient due at visit 10     Cancels/No Shows: 1/0    Subjective:    Pain:  [] Yes  [] No Location: LB/L LE Pain Rating: (0-10 scale) 3/10  Pain altered Tx:  [] No  [] Yes  Action:    Comments: Pt again reports he is getting better. Nothing else new to report.      Objective:  Modalities:   Precautions:  Exercises:  Exercise Reps/ Time Weight/ Level Comments   Nu Step 10min L5          mat      SLR x10  x5 0    Hip ABD x20 Blue tband Knees bent   Ball squeeze x20     Pelvic tilt x5  Improved  technique   March in place x15ea  Add weight next   bridging x15     [facility] MENTAL HEALTH x5ea 15\"          ITB strap stretch x3 20\"    Mod piriformis stretch x3 20\"          side      SLR x15 lime    clams x15 lime                standing      Hip ext x15ea 0    Hip ABD x15ea 0    Hip flexion  x15ea 0          Other:      Treatment Charges: Mins Units   []  Modalities     [x]  Ther Exercise 45 3   []  Manual Therapy     []  Ther Activities     []  Aquatics     []  Vasocompression     []  Other     Total Treatment time 45 3    Medicare total: $937.50 (previous Rx) + today (76.16)= $1002.24    Assessment: [x] Progressing toward goals. Pt did well with charted exercises, slight pain in R posterior hip with standing exercises today. [] No change. [] Other:  [x]  Pt would continue to benefit from skilled PT interventions to decrease pain, increase strength, ROM, and overall functional mobility for improved quality of life. STG/LTG  STG: (to be met in 7 treatments)  1. ? Pain:reduce L hip and ITB pain to 5/10 for ADL's  2. ? ROM: improve flexibility of ITB/TFL  3. ? Strength:increase strength of Left hip musculature  4. ? Function:improve Oswestry score for walking   5. Independent with Home Exercise Program     LTG: (to be met in 12 treatments)  1. Able to resume exercise with silver sneakers/[employer]  3x/week  2. Improve Oswestry score for pain intensity to mild with little variance  3. Reduce Left hip/thigh pain to 2/10                  Patient goals:return to normal    Pt. Education:  [x] Yes  [] No  [] Reviewed Prior HEP/Ed  Method of Education: [x] Verbal  [x] Demo for charted exercises  [] Written-   Comprehension of Education:  [x] Verbalizes understanding. [x] Demonstrates understanding. [x] Needs review. [x] Demonstrates/verbalizes HEP/Ed previously given. Plan: [x] Continue current frequency toward long and short term goals.     [x] Specific Instructions for subsequent treatments: begin closed chain Ex      Time In: 5:00p           Time Out: 6:00pm    Electronically signed by:  Alicia Poe PTA

## 2020-06-23 ENCOUNTER — HOSPITAL ENCOUNTER (OUTPATIENT)
Dept: PHYSICAL THERAPY | Facility: CLINIC | Age: 80
Setting detail: THERAPIES SERIES
Discharge: HOME | End: 2020-06-23
Payer: MEDICARE

## 2020-06-23 PROCEDURE — 97110 THERAPEUTIC EXERCISES: CPT

## 2020-06-24 ASSESSMENT — ENCOUNTER SYMPTOMS: RHINORRHEA: 1

## 2020-06-24 NOTE — PROGRESS NOTES
2020     Conrado Castaneda (:  1940) is a 78 y.o. male, here for evaluation of the following medical concerns:    HPI  Hypertension:  Home blood pressure monitoring: Yes. He is adherent to a low sodium diet. Patient denies chest pain, shortness of breath, headache, lightheadedness, blurred vision, peripheral edema, palpitations, dry cough and fatigue. Antihypertensive medication side effects: no medication side effects noted. Use of agents associated with hypertension: none. Sodium (mmol/L)   Date Value   04/15/2019 145    BUN (mg/dL)   Date Value   04/15/2019 21    Glucose (mg/dL)   Date Value   04/15/2019 86      Potassium (mmol/L)   Date Value   04/15/2019 4.3    CREATININE (mg/dL)   Date Value   04/15/2019 0.95           Review of Systems   HENT: Positive for rhinorrhea. All other systems reviewed and are negative. Prior to Visit Medications    Medication Sig Taking?  Authorizing Provider   ipratropium (ATROVENT) 0.03 % nasal spray 2 sprays by Nasal route 3 times daily Yes MICHAEL Lawrence CNP   tamsulosin (FLOMAX) 0.4 MG capsule TAKE 1 CAPSULE DAILY Yes MICHAEL Lawrence CNP   amLODIPine (NORVASC) 5 MG tablet TAKE 1 TABLET DAILY Yes MICHAEL Lawrence CNP   finasteride (PROSCAR) 5 MG tablet Take 1 tablet by mouth daily Yes MICHAEL Lawrence CNP   Misc Natural Products (PROSTATE SUPPORT PO) Take 1 tablet by mouth daily Yes Historical Provider, MD   [facility] COD LIVER OIL PO Take 1 tablet by mouth daily Yes Historical Provider, MD [place] Gearlula Tea 315 MG CAPS Take 315 mg by mouth daily Yes Historical Provider, MD   Flaxseed, Linseed, (FLAXSEED OIL) 1200 MG CAPS Take 1,200 mg by mouth daily Yes Historical Provider, MD   APPLE CIDER VINEGAR PO Take 1,200 mg by mouth daily Yes Historical Provider, MD   vitamin D (CHOLECALCIFEROL) 25 MCG (1000 UT) TABS tablet Take 1,000 Units by mouth daily 1/2 tablet Yes Historical Provider, Historical Provider, MD   SAW RODRIGO by Does not apply route. Yes Historical Provider, MD        Social History     Tobacco Use    Smoking status: Never Smoker    Smokeless tobacco: Never Used   Substance Use Topics    Alcohol use: Yes     Alcohol/week: 4.0 standard drinks     Types: 4 Shots of liquor per week        Vitals:    06/16/20 1414   BP: (!) 94/53  Comment: medication taken today   Site: Left Upper Arm   Position: Sitting   Cuff Size: Medium Adult   Pulse: 62   Temp: 98.2 °F (36.8 °C)   TempSrc: Temporal   Weight: 173 lb 6.4 oz (78.7 kg)   Height: 5' 5\" (1.651 m)     Estimated body mass index is 28.86 kg/m² as calculated from the following:    Height as of this encounter: 5' 5\" (1.651 m). Weight as of this encounter: 173 lb 6.4 oz (78.7 kg). Physical Exam  Vitals signs and nursing note reviewed. Constitutional:       General: He is not in acute distress. Appearance: Normal appearance. HENT:      Head: Normocephalic and atraumatic. Right Ear: Tympanic membrane, ear canal and external ear normal.      Left Ear: Tympanic membrane, ear canal and external ear normal.      Nose: Nose normal.      Mouth/Throat:      Mouth: Mucous membranes are moist.      Pharynx: Oropharynx is clear. Eyes:      Conjunctiva/sclera: Conjunctivae normal.      Pupils: Pupils are equal, round, and reactive to light. Neck:      Musculoskeletal: Normal range of motion and neck supple. Cardiovascular:      Rate and Rhythm: Normal rate and regular rhythm. Pulmonary:      Effort: Pulmonary effort is normal.      Breath sounds: Normal breath sounds. Abdominal:      General: Bowel sounds are normal.      Palpations: Abdomen is soft. Musculoskeletal: Normal range of motion. Skin:     General: Skin is warm and dry. Neurological:      General: No focal deficit present. Mental Status: He is alert and oriented to person, place, and time.    Psychiatric:         Mood and Affect: Mood normal. Behavior: Behavior normal.         Thought Content: Thought content normal.         Judgment: Judgment normal.         ASSESSMENT/PLAN:  1. Essential hypertension  - continue present medications  - Comprehensive Metabolic Panel; Future    2. Runny nose  - ipratropium (ATROVENT) 0.03 % nasal spray; 2 sprays by Nasal route 3 times daily  Dispense: 1 Bottle; Refill: 3    3. Screening for prostate cancer  - PSA Screening; Future    4. Intermittent asthma, well controlled  - stable, continue present medications    5. Angina pectoris, variant ([facility] [address]  - stable, continue present medications      Return in about 3 months (around 9/16/2020). An electronic signature was used to authenticate this note.     --Linde Simmonds, APRN - CNP on 6/24/2020 at 12:42 PM

## 2020-06-25 ENCOUNTER — HOSPITAL ENCOUNTER (OUTPATIENT)
Dept: PHYSICAL THERAPY | Facility: CLINIC | Age: 80
Setting detail: THERAPIES SERIES
Discharge: HOME | End: 2020-06-25
Payer: MEDICARE

## 2020-06-25 PROCEDURE — 97110 THERAPEUTIC EXERCISES: CPT

## 2020-06-25 NOTE — FLOWSHEET NOTE
[] [address]  [address]  [phone]  F: [phone] [] [address]  [facility]   [address]  P: [phone]  F: [phone] [] [place]  [address] [facility]  P: [phone]  F: [phone] [] [address]  [facility]   [place] Notice: [phone]  F: [phone]      Physical Therapy Daily Treatment Note    Date:  2020  Patient Name:  Conrado Castaneda    :  1940  MRN: [id number]  [address] [place] [address], D. O.                       Insurance: MediCare  Medical Diagnosis: Left hip ITB syndrome/ Left lumbar radiculitis  Rehab Codes: M79.652, M25.552, M25.652, M54.5  Onset date: Dec 2019         Visit# / total visits: ; Progress note for Medicare patient due at visit 10     Cancels/No Shows: 1/0    Subjective:    Pain:  [] Yes  [] No Location: LB/L LE Pain Rating: (0-10 scale) not bad/10  Pain altered Tx:  [] No  [] Yes  Action:    Comments: Pt reports some pain in the R side posterior hip.      Objective:  Modalities:   Precautions:  Exercises:  Exercise Reps/ Time Weight/ Level Comments   Nu Step 10min L5          mat      SLR x10  x5 0    Hip ABD x20 Blue tband Knees bent   Ball squeeze x20     Pelvic tilt x5  Improved  technique   March in place x15ea 2#    bridging x15     [facility] HE[facility] x5ea 15\"          ITB strap stretch x3 20\"    Mod piriformis stretch x3 20\"          side      SLR x15 2#    clams x15 blue                standing      Hip ext x15ea 2#    Hip ABD x15ea 2#    Hip flexion  x15ea 2#          Other:      Treatment Charges: Mins Units   []  Modalities     [x]  Ther Exercise 40 3   []  Manual Therapy     []  Ther Activities     []  Aquatics     []  Vasocompression     []  Other     Total Treatment time 40 3    Medicare total: as of 2020=

## 2020-07-01 ENCOUNTER — HOSPITAL ENCOUNTER (OUTPATIENT)
Dept: PHYSICAL THERAPY | Facility: CLINIC | Age: 80
Setting detail: THERAPIES SERIES
Discharge: HOME | End: 2020-07-01
Payer: MEDICARE

## 2020-07-01 PROCEDURE — 97110 THERAPEUTIC EXERCISES: CPT

## 2020-07-01 NOTE — PROGRESS NOTES
[] [facility]) [facility]  [address]  [phone]  F: [phone] [x] [address]  [facility]   [address]  P: [phone]  F: [phone] [] [place]  [address] [facility]  P: [phone]  F: [phone] [] [address]  [facility]   [address]   [place]: [phone]  F: [phone]      Physical Therapy Progress Note    Date: 2020      Patient: Mo Diaz  : 1940  MRN: [id number]    [address] [place] [address], D. O.                       Insurance: MediCare  Medical Diagnosis: Left hip ITB syndrome/ Left lumbar radiculitis  Rehab Codes: M79.652, M25.552, M25.652, M54.5  Onset date: Dec 2019         Visit# / total visits: ; Progress note for Medicare patient due at visit 10                                    Cancels/No Shows: 1/0      Subjective:    Pain:  [] Yes  [] No Location: LB/L LE Pain Rating: (0-10 scale) 2/10  Pain altered Tx:  [] No  [] Yes  Action:    Comments: Pt reports Left LE pain is minimal; he does note intermittent tingling in Left lateral thigh with prolonged standing    Objective:  Test Measurements/Function: Talib Mcarthur completes his 8th PT visit today secondary to a diagnosis of  Left hip ITB syndrome/ Left lumbar radiculitis; Currently, he notes the Left LE pain is minimal (2/10); He notes he does have L lateral thigh pain when standing for longer periods.  Left hip ABD strength 4+/5; improved ITB flexibility noted; Oswestry score improved to 15/50 (30% functional impairment)    Assessment:  STG: (to be met in 7 treatments) recheck 20  1. ? Pain:reduce L hip and ITB pain to 5/10 for ADL's goal met  2. ? ROM: improve flexibility of ITB/TFL goal ongoing-progress made  3. ? Strength:increase strength of Left hip musculature Left hip ABD strength 4+/5  4. ? Function:improve Oswestry score for walking no change-able to walk 1/4 mile without increasing pain  5. Independent with Home Exercise Program goal met     LTG: (to be met in 12 treatments)  1. Able to resume exercise with silver sneakers/[employer]  3x/week  2. Improve Oswestry score for pain intensity to mild with little variance  3. Reduce Left hip/thigh pain to 2/10    Treatment Plan:  [x] Therapeutic Exercise   04476  [] Iontophoresis: 4 mg/mL Dexamethasone Sodium Phosphate  mAmin  34436   [] Therapeutic Activity  19039 [] Vasopneumatic cold with compression  87294    [] Gait Training   78031 [] Ultrasound   14520   [] Neuromuscular Re-education  77267 [] Electrical Stimulation Unattended  27975   [] Manual Therapy  15707 [] Electrical Stimulation Attended  92828   [x] Instruction in HEP  [] Lumbar/Cervical Traction  07239   [] Aquatic Therapy   03303 [] Cold/hotpack    [] Massage   95378      [] Dry Needling, 1 or 2 muscles  19053   [] Biofeedback, first 15 minutes   98388  [] Biofeedback, additional 15 minutes   54598 [] Dry Needling, 3 or more muscles  52008       Patient Status:       [x] Continue per initial plan of care. Complete remaining 4 sessions then discharge with continuation of HEP      Electronically signed by Fortino Sarmiento PT on 7/1/2020 at 1:06 PM      If you have any questions or concerns, please don't hesitate to call. Thank you for your referral.    Physician Signature:________________________________Date:__________________  By signing above or cosigning this note, I have reviewed this plan of care and certify a need for medically necessary rehabilitation services.      *PLEASE SIGN ABOVE AND FAX BACK ALL PAGES*

## 2020-07-01 NOTE — FLOWSHEET NOTE
[] [address]  [address]  [phone]  F: [phone] [x] [address]  [facility] 36   [address]  P: [phone]  F: [phone] [] [address]  [address] [facility]  P: [phone]  F: [phone] [] [address]  [facility]   [address]   [place]: [phone]  F: [phone]      Physical Therapy Daily Treatment Note    Date:  2020  Patient Name:  Kelton Fleischer    :  1940  MRN: [id number]  [address] [place] [address], D. O.                       Insurance: Med[employer]  Medical Diagnosis: Left hip ITB syndrome/ Left lumbar radiculitis  Rehab Codes: M79.652, M25.552, M25.652, M54.5  Onset date: Dec 2019         Visit# / total visits: ; Progress note for Medicare patient due at visit 10     Cancels/No Shows: 1/0    Subjective:    Pain:  [] Yes  [] No Location: LB/L LE Pain Rating: (0-10 scale) 2/10  Pain altered Tx:  [] No  [] Yes  Action:    Comments: Pt reports Left LE pain is minimal; he does note intermittent tingling in Left lateral thigh with prolonged standing    Objective:  Modalities:   Precautions:  Exercises:  Exercise Reps/ Time Weight/ Level Comments   Nu Step 10min L5          mat      SLR 2x10ea 0    Hip ABD x20 Blue tband Knees bent   Ball squeeze x20     Pelvic tilt x5  Improved  technique   March in place x20ea 2#    bridging x15     SKTC x5ea 15\"          ITB strap stretch x3 20\"    Mod piriformis stretch x3 20\"          side      SLR x15 2#    clams x15 blue                standing      Hip ext x15ea 2#    Hip ABD x15ea 2#    Hip flexion  x15ea 2#          Other:      Treatment Charges: Mins Units   []  Modalities     [x]  Ther Exercise 45 3   []  Manual Therapy     []  Ther Activities     []  Aquatics     []  Vasocompression     []  Other Total Treatment time 45 3    Medicare total: as of 7/1/2020= $1207.86    Assessment: [x] Progressing toward goals. Marcel Hurt completes his 8th PT visit today secondary to a diagnosis of  Left hip ITB syndrome/ Left lumbar radiculitis; Currently, he notes the Left LE pain is minimal (2/10); He notes he does have L lateral thigh pain when standing for longer periods. Left hip ABD strength 4+/5; improved ITB flexibility noted; Oswestry score improved to 15/50 (30% functional impairment)      [] No change. [] Other:  [x]  Pt would continue to benefit from skilled PT interventions to decrease pain, increase strength, ROM, and overall functional mobility for improved quality of life. STG/LTG    STG: (to be met in 7 treatments) recheck 7/1/20  1. ? Pain:reduce L hip and ITB pain to 5/10 for ADL's goal met  2. ? ROM: improve flexibility of ITB/TFL goal ongoing-progress made  3. ? Strength:increase strength of Left hip musculature  Left hip ABD strength 4+/5  4. ? Function:improve Oswestry score for walking no change-able to walk 1/4 mile without increasing pain  5. Independent with Home Exercise Program goal met     LTG: (to be met in 12 treatments)  1. Able to resume exercise with silver sneakers/[employer]  3x/week  2. Improve Oswestry score for pain intensity to mild with little variance  3. Reduce Left hip/thigh pain to 2/10                  Patient goals:return to normal    Pt. Education:  [x] Yes  [] No  [] Reviewed Prior HEP/Ed  Method of Education: [x] Verbal  [x] Demo for charted exercises  [] Written-   Comprehension of Education:  [x] Verbalizes understanding. [x] Demonstrates understanding. [x] Needs review. [x] Demonstrates/verbalizes HEP/Ed previously given. Plan: [x] Continue current frequency toward long and short term goals.     [x] Specific Instructions for subsequent treatments: begin closed chain Ex      Time In: 1p           Time Out: 1:58p    Electronically signed by:  Krish Lyn, PT

## 2020-07-07 ENCOUNTER — HOSPITAL ENCOUNTER (OUTPATIENT)
Dept: PHYSICAL THERAPY | Facility: CLINIC | Age: 80
Setting detail: THERAPIES SERIES
Discharge: HOME | End: 2020-07-07
Payer: MEDICARE

## 2020-07-07 PROCEDURE — 97110 THERAPEUTIC EXERCISES: CPT

## 2020-07-07 NOTE — FLOWSHEET NOTE
[] [address]  [address]  [phone]  F: [phone] [x] [address]  [facility] 36   [address]  P: [phone]  F: [phone] [] [place]  [address] [facility]  P: [phone]  F: [phone] [] [address]  [facility]   [address]   [place]: [phone]  F: [phone]      Physical Therapy Daily Treatment Note    Date:  2020  Patient Name:  Dylan Ogden    :  1940  MRN: [id number]  [address] [facility], D. O.                       Insurance: MediCare  Medical Diagnosis: Left hip ITB syndrome/ Left lumbar radiculitis  Rehab Codes: M79.652, M25.552, M25.652, M54.5  Onset date: Dec 2019         Visit# / total visits: ; Progress note for Medicare patient due at visit 10     Cancels/No Shows: 1/0    Subjective:    Pain:  [] Yes  [] No Location: LB/L LE Pain Rating: (0-10 scale) 2/10  Pain altered Tx:  [] No  [] Yes  Action:    Comments: Pt reports nothing new today.       Objective:  Modalities:   Precautions:  Exercises:  Exercise Reps/ Time Weight/ Level Comments   Nu Step 10min L5          mat      SLR 2x10ea 0    Hip ABD x20 Blue tband Knees bent   Ball squeeze x20     Pelvic tilt x5  Improved  technique   March in place x20ea 2#    bridging x15     SKTC x5ea 15\"          ITB strap stretch x3 20\"    Mod piriformis stretch x3 20\"          side      SLR x15 2#    clams x15 blue                standing      Hip ext x15ea 2#    Hip ABD x15ea 2#    Hip flexion  x15ea 2#          Other:      Treatment Charges: Mins Units   []  Modalities     [x]  Ther Exercise 45 3   []  Manual Therapy     []  Ther Activities     []  Aquatics     []  Vasocompression     []  Other     Total Treatment time 45 3    Medicare total: as of 2020= $1207.86    Assessment: [x] Progressing toward goals. Pt did well with all charted exercises. No increased pain with any exercise, reports he must be doing better due to being able to complete all with minimal discomfort. Planing on next visit being his last.      [] No change. [] Other:  [x]  Pt would continue to benefit from skilled PT interventions to decrease pain, increase strength, ROM, and overall functional mobility for improved quality of life. STG/LTG    STG: (to be met in 7 treatments) recheck 7/1/20  1. ? Pain:reduce L hip and ITB pain to 5/10 for ADL's goal met  2. ? ROM: improve flexibility of ITB/TFL goal ongoing-progress made  3. ? Strength:increase strength of Left hip musculature  Left hip ABD strength 4+/5  4. ? Function:improve Oswestry score for walking no change-able to walk 1/4 mile without increasing pain  5. Independent with Home Exercise Program goal met     LTG: (to be met in 12 treatments)  1. Able to resume exercise with silver sneakers/[employer]  3x/week  2. Improve Oswestry score for pain intensity to mild with little variance  3. Reduce Left hip/thigh pain to 2/10                  Patient goals:return to normal    Pt. Education:  [x] Yes  [] No  [] Reviewed Prior HEP/Ed  Method of Education: [x] Verbal  [x] Demo for charted exercises  [] Written-   Comprehension of Education:  [x] Verbalizes understanding. [x] Demonstrates understanding. [x] Needs review. [x] Demonstrates/verbalizes HEP/Ed previously given. Plan: [x] Continue current frequency toward long and short term goals.     [x] Specific Instructions for subsequent treatments: begin closed chain Ex      Time In: 3p           Time Out: 1:55p    Electronically signed by:  Taya Martinez PTA

## 2020-07-09 ENCOUNTER — HOSPITAL ENCOUNTER (OUTPATIENT)
Age: 80
Setting detail: SPECIMEN
Discharge: HOME | End: 2020-07-09
Payer: MEDICARE

## 2020-07-09 ENCOUNTER — HOSPITAL ENCOUNTER (OUTPATIENT)
Dept: PHYSICAL THERAPY | Facility: CLINIC | Age: 80
Setting detail: THERAPIES SERIES
Discharge: HOME | End: 2020-07-09
Payer: MEDICARE

## 2020-07-09 LAB
ALBUMIN SERPL-MCNC: 4.2 G/DL (ref 3.5–5.2)
ALBUMIN/GLOBULIN RATIO: 1.7 (ref 1–2.5)
ALP BLD-CCNC: 65 U/L (ref 40–129)
ALT SERPL-CCNC: 19 U/L (ref 5–41)
ANION GAP SERPL CALCULATED.3IONS-SCNC: 13 MMOL/L (ref 9–17)
AST SERPL-CCNC: 24 U/L
BILIRUB SERPL-MCNC: 0.55 MG/DL (ref 0.3–1.2)
BUN BLDV-MCNC: 16 MG/DL (ref 8–23)
BUN/CREAT BLD: NORMAL (ref 9–20)
CALCIUM SERPL-MCNC: 9 MG/DL (ref 8.6–10.4)
CHLORIDE BLD-SCNC: 104 MMOL/L (ref 98–107)
CO2: 26 MMOL/L (ref 20–31)
CREAT SERPL-MCNC: 0.81 MG/DL (ref 0.7–1.2)
GFR AFRICAN AMERICAN: >60 ML/MIN
GFR NON-AFRICAN AMERICAN: >60 ML/MIN
GFR SERPL CREATININE-BSD FRML MDRD: NORMAL ML/MIN/{1.73_M2}
GFR SERPL CREATININE-BSD FRML MDRD: NORMAL ML/MIN/{1.73_M2}
GLUCOSE BLD-MCNC: 88 MG/DL (ref 70–99)
POTASSIUM SERPL-SCNC: 4.3 MMOL/L (ref 3.7–5.3)
PROSTATE SPECIFIC ANTIGEN: 1.39 UG/L
SODIUM BLD-SCNC: 143 MMOL/L (ref 135–144)
TOTAL PROTEIN: 6.7 G/DL (ref 6.4–8.3)

## 2020-07-09 PROCEDURE — 97110 THERAPEUTIC EXERCISES: CPT

## 2020-07-09 NOTE — FLOWSHEET NOTE
[] [address]  [address]  [phone]  F: [phone] [x] [address]  [facility] 36   [address]  P: [phone]  F: [phone] [] [place]  1500 [facility]  P: [phone]  F: [phone] [] [address]  [facility]   [address]   [place]: [phone]  F: [phone]      Physical Therapy Daily Treatment Note    Date:  2020  Patient Name:  Sasha Johnson    :  1940  MRN: [id number]  [address] [facility], D. O.                       Insurance: MediCare  Medical Diagnosis: Left hip ITB syndrome/ Left lumbar radiculitis  Rehab Codes: M79.652, M25.552, M25.652, M54.5  Onset date: Dec 2019         Visit# / total visits: 10/12; Progress note for Medicare patient due at visit 10     Cancels/No Shows: 1/0    Subjective:    Pain:  [] Yes  [] No Location: LB/L LE Pain Rating: (0-10 scale) 2/10  Pain altered Tx:  [] No  [] Yes  Action:    Comments: Pt reports he is doing good, nothing new to report.       Objective:  Modalities:   Precautions:  Exercises:  Exercise Reps/ Time Weight/ Level Comments   Nu Step 10min L5          mat      SLR 2x10ea 0    Hip ABD x20 Blue tband Knees bent   Ball squeeze x20     Pelvic tilt x5  Improved  technique   March in place x20ea 2#    bridging x15     SKTC x5ea 15\"          ITB strap stretch x3 20\"    Mod piriformis stretch x3 20\"          side      SLR x15 2#    clams x15 blue                standing      Hip ext x15ea 2#    Hip ABD x15ea 2#    Hip flexion  x15ea 2#          Other:      Treatment Charges: Mins Units   []  Modalities     [x]  Ther Exercise 40 3   []  Manual Therapy     []  Ther Activities     []  Aquatics     []  Vasocompression     []  Other     Total Treatment time 40 3    Medicare total: as of 2020= $1272.60    Assessment: [x] Progressing toward goals. No complaints with charted exercises. Planing on next visit being his last.      [] No change. [] Other:  [x]  Pt would continue to benefit from skilled PT interventions to decrease pain, increase strength, ROM, and overall functional mobility for improved quality of life. STG/LTG    STG: (to be met in 7 treatments) recheck 7/1/20  1. ? Pain:reduce L hip and ITB pain to 5/10 for ADL's goal met  2. ? ROM: improve flexibility of ITB/TFL goal ongoing-progress made  3. ? Strength:increase strength of Left hip musculature  Left hip ABD strength 4+/5  4. ? Function:improve Oswestry score for walking no change-able to walk 1/4 mile without increasing pain  5. Independent with Home Exercise Program goal met     LTG: (to be met in 12 treatments) 7/9  1. Able to resume exercise with silver sneakers/[employer]  3x/week MET  2. Improve Oswestry score for pain intensity to mild with little variance  3. Reduce Left hip/thigh pain to 2/10 MET, only 3/10 with workout                  Patient goals:return to normal    Pt. Education:  [x] Yes  [] No  [] Reviewed Prior HEP/Ed  Method of Education: [x] Verbal  [x] Demo for charted exercises  [] Written-   Comprehension of Education:  [x] Verbalizes understanding. [x] Demonstrates understanding. [x] Needs review. [x] Demonstrates/verbalizes HEP/Ed previously given. Plan: [x] Continue current frequency toward long and short term goals.     [x] Specific Instructions for subsequent treatments: begin closed chain Ex      Time In: 3p           Time Out: 3:50p    Electronically signed by:  Rhonda Wing PTA

## 2020-07-23 ENCOUNTER — HOSPITAL ENCOUNTER (OUTPATIENT)
Dept: PHYSICAL THERAPY | Facility: CLINIC | Age: 80
Setting detail: THERAPIES SERIES
Discharge: HOME | End: 2020-07-23
Payer: MEDICARE

## 2020-07-23 PROCEDURE — 97110 THERAPEUTIC EXERCISES: CPT

## 2020-07-23 NOTE — FLOWSHEET NOTE
[] [address]  [address]  [phone]  F: [phone] [x] [address]  [facility] 36   [address]  P: [phone]  F: [phone] [] [address]  [address] [facility]  P: [phone]  F: [phone] [] [address]  [facility]   [address]   [place]: [phone]  F: [phone]      Physical Therapy Daily Treatment Note    Date:  2020  Patient Name:  Parish Asencio    :  1940  MRN: [id number]  [address] [place] [address], D[address] O.                       Insurance: Med[employer]Care  Medical Diagnosis: Left hip ITB syndrome/ Left lumbar radiculitis  Rehab Codes: M79.652, M25.552, M25.652, M54.5  Onset date: Dec 2019         Visit# / total visits: ; Progress note for Medicare patient due at visit 10     Cancels/No Shows: 1/0    Subjective:    Pain:  [] Yes  [] No Location: LB/L LE Pain Rating: (0-10 scale) 1-2/10  Pain altered Tx:  [] No  [] Yes  Action:    Comments: Pt states he is doing good, still getting better. Gets a burning sensation in L lateral thigh, not constant.       Objective:  Modalities:   Precautions:  Exercises:  Exercise Reps/ Time Weight/ Level Comments   Nu Step 10min L5          mat      SLR 10ea 2#    Hip ABD x20 Blue tband Knees bent   Ball squeeze x20     Pelvic tilt x5  Improved  technique   March in place x20ea 2#    bridging x15     SKTC x5ea 15\"          ITB strap stretch x3 20\"    Mod piriformis stretch x3 20\"          side      SLR x15 2#    clams x15 blue                standing      Hip ext x15ea 2#    Hip ABD x15ea 2#    Hip flexion  x15ea 2#          Other:      Treatment Charges: Mins Units   []  Modalities     [x]  Ther Exercise 40 3   []  Manual Therapy     []  Ther Activities     []  Aquatics     []  Vasocompression     []  Other     Total Treatment time 40 3    Medicare total: as of 7/23/2020= $1337.34    Assessment: [x] Progressing toward goals. Pt did well with all again today. Planing on next visit being his last.      [] No change. [] Other:  [x]  Pt would continue to benefit from skilled PT interventions to decrease pain, increase strength, ROM, and overall functional mobility for improved quality of life. STG/LTG    STG: (to be met in 7 treatments) recheck 7/1/20  1. ? Pain:reduce L hip and ITB pain to 5/10 for ADL's goal met  2. ? ROM: improve flexibility of ITB/TFL goal ongoing-progress made  3. ? Strength:increase strength of Left hip musculature  Left hip ABD strength 4+/5  4. ? Function:improve Oswestry score for walking no change-able to walk 1/4 mile without increasing pain  5. Independent with Home Exercise Program goal met     LTG: (to be met in 12 treatments) 7/9  1. Able to resume exercise with silver sneakers/[employer]  3x/week MET  2. Improve Oswestry score for pain intensity to mild with little variance  3. Reduce Left hip/thigh pain to 2/10 MET, only 3/10 with workout                  Patient goals:return to normal    Pt. Education:  [x] Yes  [] No  [] Reviewed Prior HEP/Ed  Method of Education: [x] Verbal  [x] Demo for charted exercises  [] Written-   Comprehension of Education:  [x] Verbalizes understanding. [x] Demonstrates understanding. [x] Needs review. [x] Demonstrates/verbalizes HEP/Ed previously given. Plan: [x] Continue current frequency toward long and short term goals.     [x] Specific Instructions for subsequent treatments: begin closed chain Ex      Time In: 3p           Time Out: 3:50p    Electronically signed by:  Dhara Pastor PTA

## 2020-08-06 ENCOUNTER — HOSPITAL ENCOUNTER (OUTPATIENT)
Dept: PHYSICAL THERAPY | Facility: CLINIC | Age: 80
Setting detail: THERAPIES SERIES
Discharge: HOME | End: 2020-08-06
Payer: MEDICARE

## 2020-08-06 PROCEDURE — 97110 THERAPEUTIC EXERCISES: CPT

## 2020-08-06 NOTE — FLOWSHEET NOTE
[] [address]  [address]  [phone]  F: [phone] [x] [address]  [facility] 36   [address]  P: [phone]  F: [phone] [] [place]  [address] [facility]  P: [phone]  F: [phone] [] [address]  [facility]   [address]   [place]: [phone]  F: [phone]      Physical Therapy Daily Treatment Note    Date:  2020  Patient Name:  Nelsy Ochoa    :  1940  MRN: [id number]  [address] [facility], D[address] O.                       Insurance: MediCare  Medical Diagnosis: Left hip ITB syndrome/ Left lumbar radiculitis  Rehab Codes: M79.652, M25.552, M25.652, M54.5  Onset date: Dec 2019         Visit# / total visits: ; Progress note for Medicare patient due at visit 10     Cancels/No Shows: 1/0    Subjective:    Pain:  [] Yes  [] No Location: LB/L LE Pain Rating: (0-10 scale) 1-2/10  Pain altered Tx:  [] No  [] Yes  Action:    Comments: Pt reports nothing new.        Objective:  Modalities:   Precautions:  Exercises:  Exercise Reps/ Time Weight/ Level Comments   Nu Step 10min L5          mat      SLR 10ea 2#    Hip ABD x20 Blue tband Knees bent   Ball squeeze x20     Pelvic tilt x5  Improved  technique   March in place x20ea 2#    bridging x15     SKTC x5ea 15\"          ITB strap stretch x3 20\"    Mod piriformis stretch x3 20\"          side   Not today   SLR   x15 2#    clams x15 blue                standing      Hip ext x15ea 2#    Hip ABD x15ea 2#    Hip flexion  x15ea 2#          Other:      Treatment Charges: Mins Units   []  Modalities     [x]  Ther Exercise 35 2   []  Manual Therapy     []  Ther Activities     []  Aquatics     []  Vasocompression     []  Other     Total Treatment time 35 2    Medicare total: as of 2020= $2335.76    Assessment: [x] Progressing toward goals. Pt did well with all again today, did not complete all exercises due to pts late arrival.        [] No change. [] Other:  [x]  Pt would continue to benefit from skilled PT interventions to decrease pain, increase strength, ROM, and overall functional mobility for improved quality of life. STG/LTG    STG: (to be met in 7 treatments) recheck 7/1/20  1. ? Pain:reduce L hip and ITB pain to 5/10 for ADL's goal met  2. ? ROM: improve flexibility of ITB/TFL goal ongoing-progress made  3. ? Strength:increase strength of Left hip musculature  Left hip ABD strength 4+/5  4. ? Function:improve Oswestry score for walking no change-able to walk 1/4 mile without increasing pain  5. Independent with Home Exercise Program goal met     LTG: (to be met in 12 treatments) 7/9  1. Able to resume exercise with silver sneakers/[employer]  3x/week MET  2. Improve Oswestry score for pain intensity to mild with little variance  3. Reduce Left hip/thigh pain to 2/10 MET, only 3/10 with workout                  Patient goals:return to normal    Pt. Education:  [x] Yes  [] No  [] Reviewed Prior HEP/Ed  Method of Education: [x] Verbal  [x] Demo for charted exercises  [] Written-   Comprehension of Education:  [x] Verbalizes understanding. [x] Demonstrates understanding. [x] Needs review. [x] Demonstrates/verbalizes HEP/Ed previously given. Plan: [x] Continue current frequency toward long and short term goals.     [x] Specific Instructions for subsequent treatments: begin closed chain Ex      Time In: 3:10p           Time Out: 4:00p    Electronically signed by:  Rox Hernandez, PTA

## 2020-09-22 ENCOUNTER — VIRTUAL VISIT (OUTPATIENT)
Dept: INTERNAL MEDICINE | Age: 80
End: 2020-09-22
Payer: MEDICARE

## 2020-09-22 PROCEDURE — G0438 PPPS, INITIAL VISIT: HCPCS | Performed by: NURSE PRACTITIONER

## 2020-09-22 ASSESSMENT — LIFESTYLE VARIABLES
HOW OFTEN DURING THE LAST YEAR HAVE YOU BEEN UNABLE TO REMEMBER WHAT HAPPENED THE NIGHT BEFORE BECAUSE YOU HAD BEEN DRINKING: 0
HOW OFTEN DURING THE LAST YEAR HAVE YOU FOUND THAT YOU WERE NOT ABLE TO STOP DRINKING ONCE YOU HAD STARTED: 0
AUDIT-C TOTAL SCORE: 4
HAS A RELATIVE, FRIEND, DOCTOR, OR ANOTHER HEALTH PROFESSIONAL EXPRESSED CONCERN ABOUT YOUR DRINKING OR SUGGESTED YOU CUT DOWN: 0
HAVE YOU OR SOMEONE ELSE BEEN INJURED AS A RESULT OF YOUR DRINKING: 0
HOW OFTEN DURING THE LAST YEAR HAVE YOU FAILED TO DO WHAT WAS NORMALLY EXPECTED FROM YOU BECAUSE OF DRINKING: 0
HOW OFTEN DO YOU HAVE A DRINK CONTAINING ALCOHOL: 4
HOW OFTEN DO YOU HAVE SIX OR MORE DRINKS ON ONE OCCASION: 0
HOW OFTEN DURING THE LAST YEAR HAVE YOU HAD A FEELING OF GUILT OR REMORSE AFTER DRINKING: 0
HOW MANY STANDARD DRINKS CONTAINING ALCOHOL DO YOU HAVE ON A TYPICAL DAY: 0
AUDIT TOTAL SCORE: 4
HOW OFTEN DURING THE LAST YEAR HAVE YOU NEEDED AN ALCOHOLIC DRINK FIRST THING IN THE MORNING TO GET YOURSELF GOING AFTER A NIGHT OF HEAVY DRINKING: 0

## 2020-09-22 ASSESSMENT — PATIENT HEALTH QUESTIONNAIRE - PHQ9
SUM OF ALL RESPONSES TO PHQ QUESTIONS 1-9: 0
SUM OF ALL RESPONSES TO PHQ9 QUESTIONS 1 & 2: 0
SUM OF ALL RESPONSES TO PHQ QUESTIONS 1-9: 0
1. LITTLE INTEREST OR PLEASURE IN DOING THINGS: 0
2. FEELING DOWN, DEPRESSED OR HOPELESS: 0

## 2020-09-22 NOTE — PROGRESS NOTES
Medicare Annual Wellness Visit  Are Name: Nancy Moe Date: 2020   MRN: [id number] Sex: Male   Age: [de-identified] y.o. Ethnicity: Non-/Non    : 1940 Race: Faith Angelucci is here for Medicare AWV and Health Maintenance (cant get vaccinations due to VV)    Screenings for behavioral, psychosocial and functional/safety risks, and cognitive dysfunction are all negative except as indicated below. These results, as well as other patient data from the [address] form, are documented in Flowsheets linked to this Encounter. Allergies   Allergen Reactions    Aspirin     Ibuprofen          Prior to Visit Medications    Medication Sig Taking?  Authorizing Provider   ipratropium (ATROVENT) 0.03 % nasal spray 2 sprays by Nasal route 3 times daily Yes MICHAEL Cisneros CNP   tamsulosin (FLOMAX) 0.4 MG capsule TAKE 1 CAPSULE DAILY Yes MICHAEL Cisneros CNP   amLODIPine (NORVASC) 5 MG tablet TAKE 1 TABLET DAILY Yes MICHAEL Cisneros CNP   finasteride (PROSCAR) 5 MG tablet Take 1 tablet by mouth daily Yes MICHAEL Cisneros CNP   Misc Natural Products (PROSTATE SUPPORT PO) Take 1 tablet by mouth daily Yes Historical Provider, MD   [facility] [place] [facility] [place] COD LIVER OIL PO Take 1 tablet by mouth daily Yes Historical Provider, MD Mireles Dues Tea 315 MG CAPS Take 315 mg by mouth daily Yes Historical Provider, MD   Flaxseed, Linseed, (FLAXSEED OIL) 1200 MG CAPS Take 1,200 mg by mouth daily Yes Historical Provider, MD   APPLE CIDER VINEGAR PO Take 1,200 mg by mouth daily Yes Historical Provider, MD   vitamin D (CHOLECALCIFEROL) 25 MCG (1000 UT) TABS tablet Take 1,000 Units by mouth daily 1/2 tablet Yes Historical Provider, MD   melatonin 3 MG TABS tablet Take 3 mg by mouth nightly as needed (1/2 tablet) Yes Historical Provider, MD   VITAMIN E NATURAL PO Take 180 mg by mouth daily Yes Historical Provider, MD   coenzyme Q10 100 MG CAPS capsule Take 100 mg by mouth daily Yes  Right rotator cuff tear     Sinusitis, chronic     Sinusitis, chronic 11/5/2011    Trigger finger     Trigger finger        Past Surgical History:   Procedure Laterality Date    JOINT REPLACEMENT Bilateral     knee replacements         Family History   Problem Relation Age of Onset    Cancer Mother 80        colon    Heart Disease Mother     Heart Disease Father     Other Father 70        ALS       CareTeam (Including outside providers/suppliers regularly involved in providing care):   Patient Care Team:  MICHAEL Wilkins CNP as PCP - General (Family Nurse Practitioner)  MICHAEL Wilkins CNP as PCP - [facility]    Wt Readings from Last 3 Encounters:   06/16/20 173 lb 6.4 oz (78.7 kg)   05/12/20 175 lb (79.4 kg)   02/11/20 175 lb 11.3 oz (79.7 kg)      No flowsheet data found. There is no height or weight on file to calculate BMI. Based upon direct observation of the patient, evaluation of cognition reveals recent and remote memory intact. .  Wt Readings from Last 3 Encounters:   06/16/20 173 lb 6.4 oz (78.7 kg)   05/12/20 175 lb (79.4 kg)   02/11/20 175 lb 11.3 oz (79.7 kg)     Temp Readings from Last 3 Encounters:   06/16/20 98.2 °F (36.8 °C) (Temporal)   05/12/20 98 °F (36.7 °C) (Oral)   09/21/16 97.8 °F (36.6 °C)     BP Readings from Last 3 Encounters:   06/16/20 (!) 94/53   05/12/20 123/75   02/11/20 136/85     Pulse Readings from Last 3 Encounters:   06/16/20 62   05/12/20 64   02/11/20 77       Patient's complete Health Risk Assessment and screening values have been reviewed and are found in Flowsheets. The following problems were reviewed today and where indicated follow up appointments were made and/or referrals ordered. Positive Risk Factor Screenings with Interventions:     Cognitive:   Words recalled: 2 Words Recalled  Clock Drawing Test (CDT) Score: (chepe)  Total Score Interpretation: Positive Mini-Cog  Cognitive Impairment Interventions:  · Patient declines any further evaluation/treatment for cognitive impairment    Substance History:  Social History     Tobacco History     Smoking Status  Never Smoker    Smokeless Tobacco Use  Never Used          Alcohol History     Alcohol Use Status  Yes Drinks/Week  4 Shots of liquor, 0 Standard drinks or equivalent per week Amount  4.0 standard drinks of alcohol/wk          Drug Use     Drug Use Status  Yes Comment  CBD           Sexual Activity     Sexually Active  Not Currently               Alcohol Screening: Audit-C Score: 4  Total Score: 4    A score of 8 or more is associated with harmful or hazardous drinking. A score of 13 or more in women, and 15 or more in men, is likely to indicate alcohol dependence. Substance Abuse Interventions:  · Alcohol misuse/dependence:  patient is not ready to change his/her alcohol consumption behavior at this time    Health Habits/Nutrition:  Health Habits/Nutrition  Do you exercise for at least 20 minutes 2-3 times per week?: Yes  Have you lost any weight without trying in the past 3 months?: No  Do you eat fewer than 2 meals per day?: No  Have you seen a dentist within the past year?: Yes  There is no height or weight on file to calculate BMI.   Health Habits/Nutrition Interventions:  · n/a    Personalized Preventive Plan   Current Health Maintenance Status  Immunization History   Administered Date(s) Administered    Influenza 12/14/2012, 11/27/2013    Influenza Vaccine, unspecified formulation 12/14/2012, 11/27/2013, 01/10/2019    Influenza Virus Vaccine 11/12/2014    Influenza, High Dose (Fluzone 65 yrs and older) 11/16/2011, 01/26/2016, 01/03/2020    Influenza, [place], IM, (6 mo and older Fluzone, Flulaval, Fluarix and 3 yrs and older Afluria) 12/07/2016    Influenza, Triv, inactivated, subunit, adjuvanted, IM (Fluad 65 yrs and older) 01/10/2019    Pneumococcal Conjugate 13-valent ([id number]) 12/07/2016    Pneumococcal Polysaccharide ([id number]) 07/19/2018 Health Maintenance   Topic Date Due    DTaP/Tdap/Td vaccine (1 - Tdap) 07/10/1959    Shingles Vaccine (1 of 2) 07/10/1990    Annual Wellness Visit (AWV)  05/29/2019    Flu vaccine (1) 09/01/2020    Potassium monitoring  07/09/2021    Creatinine monitoring  07/09/2021    Pneumococcal 65+ years Vaccine  Completed    Hepatitis A vaccine  Aged Out    Hepatitis B vaccine  Aged Out    Hib vaccine  Aged Out    Meningococcal (ACWY) vaccine  Aged Out     Recommendations for [employer] Due: see orders and patient instructions/AVS.  . Recommended screening schedule for the next 5-10 years is provided to the patient in written form: see Patient Instructions/AVS.    Junaid Torres was seen today for medicare awv and health maintenance. Diagnoses and all orders for this visit:    Routine general medical examination at a health care facility  [address]              Clement Hensley is a [de-identified] y.o. male being evaluated by a Virtual Visit (phone) encounter to address concerns as mentioned above. A caregiver was present when appropriate. Due to this being a TeleHealth encounter (During [facility] public health emergency), evaluation of the following organ systems was limited: Vitals/Constitutional/EENT/Resp/CV/GI//MS/Neuro/Skin/Heme-Lymph-Imm. Pursuant to the emergency declaration under the [facility], [address] [place] [address] authority and the [employer] Act, this Virtual Visit was conducted with patient's (and/or legal guardian's) consent, to reduce the patient's risk of exposure to COVID-19 and provide necessary medical care. The patient (and/or legal guardian) has also been advised to contact this office for worsening conditions or problems, and seek emergency medical treatment and/or call 911 if deemed necessary.      Patient identification was verified at the start of the visit: Yes    Services were provided through phone to substitute for in-person clinic visit. Patient and provider were located at their individual homes. --MICHAEL Jenkins CNP on 9/22/2020 at 1:58 PM    An electronic signature was used to authenticate this note.

## 2020-10-30 RX ORDER — TAMSULOSIN HYDROCHLORIDE 0.4 MG/1
CAPSULE ORAL
Qty: 90 CAPSULE | Refills: 1 | Status: SHIPPED | OUTPATIENT
Start: 2020-10-30 | End: 2021-05-25

## 2020-10-30 NOTE — TELEPHONE ENCOUNTER
Request for tamsulosin - med pended. Please fill if appropriate. Next Visit Date:  No future appointments.     Health Maintenance   Topic Date Due    DTaP/Tdap/Td vaccine (1 - Tdap) 07/10/1959    Shingles Vaccine (1 of 2) 07/10/1990    Flu vaccine (1) 09/01/2020    Potassium monitoring  07/09/2021    Creatinine monitoring  07/09/2021    Annual Wellness Visit (AWV)  09/23/2021    Pneumococcal 65+ years Vaccine  Completed    Hepatitis A vaccine  Aged Out    Hepatitis B vaccine  Aged Out    Hib vaccine  Aged Out    Meningococcal (ACWY) vaccine  Aged Out       No results found for: LABA1C          ( goal A1C is < 7)   No results found for: LABMICR  LDL Calculated (mg/dL)   Date Value   11/21/2016 115       (goal LDL is <100)   AST (U/L)   Date Value   07/09/2020 24     ALT (U/L)   Date Value   07/09/2020 19     BUN (mg/dL)   Date Value   07/09/2020 16     BP Readings from Last 3 Encounters:   06/16/20 (!) 94/53   05/12/20 123/75   02/11/20 136/85          (goal 120/80)    All Future Testing planned in [facility]        Patient Active Problem List:     Osteoarthritis of knees     Benign prostatic hyperplasia with nocturia     Essential hypertension     Mild intermittent asthma without complication

## 2020-12-07 RX ORDER — ATENOLOL 25 MG/1
25 TABLET ORAL DAILY
Qty: 90 TABLET | Refills: 1 | Status: SHIPPED | OUTPATIENT
Start: 2020-12-07 | End: 2021-05-25

## 2020-12-15 ENCOUNTER — OFFICE VISIT (OUTPATIENT)
Dept: INTERNAL MEDICINE | Age: 80
End: 2020-12-15
Payer: MEDICARE

## 2020-12-15 VITALS
BODY MASS INDEX: 28.89 KG/M2 | WEIGHT: 173.4 LBS | SYSTOLIC BLOOD PRESSURE: 105 MMHG | HEIGHT: 65 IN | TEMPERATURE: 98.2 F | HEART RATE: 53 BPM | DIASTOLIC BLOOD PRESSURE: 59 MMHG

## 2020-12-15 PROCEDURE — G8482 FLU IMMUNIZE ORDER/ADMIN: HCPCS | Performed by: NURSE PRACTITIONER

## 2020-12-15 PROCEDURE — 99211 OFF/OP EST MAY X REQ PHY/QHP: CPT | Performed by: NURSE PRACTITIONER

## 2020-12-15 PROCEDURE — 1036F TOBACCO NON-USER: CPT | Performed by: NURSE PRACTITIONER

## 2020-12-15 PROCEDURE — 1123F ACP DISCUSS/DSCN MKR DOCD: CPT | Performed by: NURSE PRACTITIONER

## 2020-12-15 PROCEDURE — G8427 DOCREV CUR MEDS BY ELIG CLIN: HCPCS | Performed by: NURSE PRACTITIONER

## 2020-12-15 PROCEDURE — 99214 OFFICE O/P EST MOD 30 MIN: CPT | Performed by: NURSE PRACTITIONER

## 2020-12-15 PROCEDURE — 4040F PNEUMOC VAC/ADMIN/RCVD: CPT | Performed by: NURSE PRACTITIONER

## 2020-12-15 PROCEDURE — 90688 IIV4 VACCINE SPLT 0.5 ML IM: CPT | Performed by: NURSE PRACTITIONER

## 2020-12-15 PROCEDURE — G8417 CALC BMI ABV UP PARAM F/U: HCPCS | Performed by: NURSE PRACTITIONER

## 2020-12-15 NOTE — PATIENT INSTRUCTIONS
Return To Clinic 3/16/2021 for 3 month follow up in office. After Visit Summary given and reviewed. The medication list included in this document is our record of what you are currently taking, including any changes that were made at today's visit. If you find any differences when compared to your medications at home, or have any questions that were not answered at your visit, please contact the office. It is very important for your care that you keep your appointment. If for some reason you are unable to keep your appointment, it is equally important that you call our office at [phone] to cancel your appointment and reschedule. Failure to do so may result in your termination from our practice. [facility]. Specialty office will contact patient for appointment. A copy of referral with contact information and address given to patient. Patient should contact specialist office if no contact has been made to patient within 1 week. Referral for Urology sent to [facility]. Specialty office will contact patient for appointment. A copy of referral with contact information and address given to patient. Patient should contact specialist office if no contact has been made to patient within 1 week. Patient was administered vaccination(s) for Influenza in the office. VIS given to patient for each vaccination given. Script for Shingrix and Tdap was printed and given to patient.  Patient was instructed to take script(s) to pharmacy to get filled.    -JOYCE Casanova

## 2020-12-15 NOTE — PROGRESS NOTES
12/15/2020     Josh Riojas (:  1940) is a [de-identified] y.o. male, here for evaluation of the following medical concerns:    Hypertension:  Home blood pressure monitoring: No.  He is not adherent to a low sodium diet. Patient denies chest pain, shortness of breath and lightheadedness. Antihypertensive medication side effects: no medication side effects noted. Use of agents associated with hypertension: none. Sodium (mmol/L)   Date Value   2020 143    BUN (mg/dL)   Date Value   2020 16    Glucose (mg/dL)   Date Value   2020 88   04/15/2019 86      Potassium (mmol/L)   Date Value   2020 4.3    CREATININE (mg/dL)   Date Value   2020 0.81         Chronic Joint Pain:  Patient presents for follow-up of pain in the right DIP(s): 5th and thumb and both knee(s). Pain is worse. On average, pain is perceived as moderate (4-6 pain scale). Change in quality of symptoms: no.  Current treatment: brace/splint, which has been somewhat effective. Medication side effects: none. Recent diagnostic testing: none. Review of Systems   All other systems reviewed and are negative. Prior to Visit Medications    Medication Sig Taking? Authorizing Provider   zoster recombinant adjuvanted vaccine (SHINGRIX) 50 MCG/0.5ML SUSR injection 50 MCG IM then repeat 2-6 months.  Yes Joey Boswell, MICHAEL - CNP   atenolol (TENORMIN) 25 MG tablet Take 1 tablet by mouth daily Yes MICHAEL Booth CNP   tamsulosin (FLOMAX) 0.4 MG capsule TAKE 1 CAPSULE DAILY Yes MICHAEL Booth CNP   ipratropium (ATROVENT) 0.03 % nasal spray 2 sprays by Nasal route 3 times daily Yes MICHAEL Booth CNP   amLODIPine (NORVASC) 5 MG tablet TAKE 1 TABLET DAILY Yes MICHAEL Booth CNP   finasteride (PROSCAR) 5 MG tablet Take 1 tablet by mouth daily Yes MICHAEL Booth CNP Misc Natural Products (PROSTATE SUPPORT PO) Take 1 tablet by mouth daily Yes Historical Provider, MD   [place] [employer] Tea 315 MG CAPS Take 315 mg by mouth daily Yes Historical Provider, MD   Flaxseed, Linseed, (FLAXSEED OIL) 1200 MG CAPS Take 1,200 mg by mouth daily Yes Historical Provider, MD   APPLE CIDER VINEGAR PO Take 1,200 mg by mouth daily Yes Historical Provider, MD   melatonin 3 MG TABS tablet Take 3 mg by mouth nightly as needed (1/2 tablet) Yes Historical Provider, MD   TURMERIC PO Take 1 tablet by mouth daily Yes Historical Provider, MD   Omega-3 Fatty Acids (FISH OIL) 1360 MG CAPS Take 1,360 mg by mouth daily Yes Historical Provider, MD   montelukast (SINGULAIR) 10 MG tablet Take 1 tablet by mouth nightly Yes Xu Jose APRN - CNP   fluticasone-salmeterol (ADVAIR DISKUS) 250-50 MCG/DOSE AEPB Inhale 1 puff into the lungs 2 times daily Yes Xu Garcia APRN - CNP   albuterol sulfate HFA (PROAIR HFA) 108 (90 Base) MCG/ACT inhaler 2 puffs inhaled every 4-6 hours as needed for cough, wheeze, chest tightness or shortness of breath Yes Loerlinda Jose APRN - CNP   fluticasone (VERAMYST) 27.5 MCG/SPRAY nasal spray 2 sprays to each nostril once daily Yes Loletcrystal Jose, APRN - CNP   acetaminophen (TYLENOL) 500 MG tablet Take 1 tablet by mouth every 8 hours as needed for Pain Yes Dulce Sanchez MD   Minoxidil (ROGAINE MENS EX) Apply  topically. Yes Historical Provider, MD   Multiple Vitamins-Minerals (COMPLETE SENIOR PO) Take by mouth Takes 1/2 tablet daily Yes Historical Provider, MD   Selenium 200 MCG TABS Take  by mouth. Half tab daily  Yes Historical Provider, MD   vitamin D (ERGOCALCIFEROL) 400 UNITS CAPS Take 400 Units by mouth daily Takes 1/2 tablet daily Yes Historical Provider, MD   calcium & magnesium carbonates (MYLANTA) per tablet Take  by mouth daily.  Half tab  Yes Historical Provider, MD   [facility] [place] COD LIVER OIL PO Take 1 tablet by mouth daily  Historical Provider, MD Musculoskeletal:      Right knee: He exhibits bony tenderness. Left knee: He exhibits bony tenderness. Hands:    Skin:     General: Skin is warm and dry. Neurological:      General: No focal deficit present. Mental Status: He is alert and oriented to person, place, and time. Psychiatric:         Mood and Affect: Mood normal.         Behavior: Behavior normal.         Thought Content: Thought content normal.         Judgment: Judgment normal.         ASSESSMENT/PLAN:  1. Essential hypertension  - continue present medications    2. Need for prophylactic vaccination against diphtheria-tetanus-pertussis (DTP)  - Tdap (ADACEL) 5-2-15.5 LF-MCG/0.5 injection; Inject 0.5 mLs into the muscle once for 1 dose  Dispense: 0.5 mL; Refill: 0    3. Need for prophylactic vaccination and inoculation against varicella  - zoster recombinant adjuvanted vaccine (SHINGRIX) 50 MCG/0.5ML SUSR injection; 50 MCG IM then repeat 2-6 months. Dispense: 0.5 mL; Refill: 1    4. Flu vaccine need  - INFLUENZA, QUADV, 3 YRS AND OLDER, IM, MDV, 0.5ML (AFLURIA QUADV)  - ID ADMIN INFLUENZA VIRUS VAC    5. Nocturia associated with benign prostatic hyperplasia  - AFL - Wilmer Raza MD, Urology, [facility]    6. Trigger little finger of right hand  - [facility], [address], , Orthopedic Surgery, [facility]    7. Chronic pain of right thumb  - Tanya Wallace DO, Orthopedic Surgery, [facility]      Return in about 3 months (around 3/15/2021). An electronic signature was used to authenticate this note.     --MICHAEL Velez - CNP on 12/16/2020 at 9:44 AM

## 2021-01-07 DIAGNOSIS — R35.1 BENIGN PROSTATIC HYPERPLASIA WITH NOCTURIA: ICD-10-CM

## 2021-01-07 DIAGNOSIS — N40.1 BENIGN PROSTATIC HYPERPLASIA WITH NOCTURIA: ICD-10-CM

## 2021-01-08 RX ORDER — FINASTERIDE 5 MG/1
TABLET, FILM COATED ORAL
Qty: 90 TABLET | Refills: 1 | Status: SHIPPED | OUTPATIENT
Start: 2021-01-08 | End: 2021-07-28 | Stop reason: SDUPTHER

## 2021-01-19 DIAGNOSIS — I10 ESSENTIAL HYPERTENSION: ICD-10-CM

## 2021-01-19 RX ORDER — AMLODIPINE BESYLATE 5 MG/1
TABLET ORAL
Qty: 90 TABLET | Refills: 1 | Status: SHIPPED | OUTPATIENT
Start: 2021-01-19 | End: 2021-08-12 | Stop reason: ALTCHOICE

## 2021-01-19 RX ORDER — MONTELUKAST SODIUM 10 MG/1
10 TABLET ORAL NIGHTLY
Qty: 90 TABLET | Refills: 1 | Status: SHIPPED | OUTPATIENT
Start: 2021-01-19 | End: 2021-09-07 | Stop reason: SDUPTHER

## 2021-01-19 NOTE — TELEPHONE ENCOUNTER
Pt is switching pharmacy and needs singulair and norvasc sent to the new pharmacy. New pharmacy is the [facility].

## 2021-05-24 DIAGNOSIS — I10 ESSENTIAL HYPERTENSION: ICD-10-CM

## 2021-05-24 DIAGNOSIS — N40.1 BENIGN PROSTATIC HYPERPLASIA WITH NOCTURIA: ICD-10-CM

## 2021-05-24 DIAGNOSIS — R35.1 BENIGN PROSTATIC HYPERPLASIA WITH NOCTURIA: ICD-10-CM

## 2021-05-25 RX ORDER — TAMSULOSIN HYDROCHLORIDE 0.4 MG/1
CAPSULE ORAL
Qty: 90 CAPSULE | Refills: 1 | Status: SHIPPED | OUTPATIENT
Start: 2021-05-25 | End: 2021-11-30 | Stop reason: SDUPTHER

## 2021-05-25 RX ORDER — ATENOLOL 25 MG/1
TABLET ORAL
Qty: 90 TABLET | Refills: 1 | Status: SHIPPED | OUTPATIENT
Start: 2021-05-25 | End: 2021-12-06 | Stop reason: SDUPTHER

## 2021-05-25 NOTE — TELEPHONE ENCOUNTER
Refill request for Atenolol and Tamsulosin. If appropriate please send medication(s) to patients pharmacy.     Next appt: 8/9/2021      Health Maintenance   Topic Date Due    DTaP/Tdap/Td vaccine (1 - Tdap) Never done    Shingles Vaccine (1 of 2) Never done    Potassium monitoring  07/09/2021    Creatinine monitoring  07/09/2021    Annual Wellness Visit (AWV)  09/23/2021    Flu vaccine  Completed    Pneumococcal 65+ years Vaccine  Completed    COVID-19 Vaccine  Completed    Hepatitis A vaccine  Aged Out    Hepatitis B vaccine  Aged Out    Hib vaccine  Aged Out    Meningococcal (ACWY) vaccine  Aged Out       No results found for: LABA1C          ( goal A1C is < 7)   No results found for: LABMICR  LDL Calculated (mg/dL)   Date Value   11/21/2016 115       (goal LDL is <100)   AST (U/L)   Date Value   07/09/2020 24     ALT (U/L)   Date Value   07/09/2020 19     BUN (mg/dL)   Date Value   07/09/2020 16     BP Readings from Last 3 Encounters:   12/15/20 (!) 105/59   06/16/20 (!) 94/53   05/12/20 123/75          (goal 120/80)          Patient Active Problem List:     Osteoarthritis of knees     Benign prostatic hyperplasia with nocturia     Essential hypertension     Mild intermittent asthma without complication

## 2021-06-16 NOTE — TELEPHONE ENCOUNTER
VM left from patient requesting a refill on Atenolol 25 mg. Patient has an appt on 12/15/20.       Health Maintenance   Topic Date Due    DTaP/Tdap/Td vaccine (1 - Tdap) 07/10/1959    Shingles Vaccine (1 of 2) 07/10/1990    Flu vaccine (1) 09/01/2020    Potassium monitoring  07/09/2021    Creatinine monitoring  07/09/2021    Annual Wellness Visit (AWV)  09/23/2021    Pneumococcal 65+ years Vaccine  Completed    Hepatitis A vaccine  Aged Out    Hepatitis B vaccine  Aged Out    Hib vaccine  Aged Out    Meningococcal (ACWY) vaccine  Aged Out             (applicable per patient's age: Cancer Screenings, Depression Screening, Fall Risk Screening, Immunizations)    LDL Calculated (mg/dL)   Date Value   11/21/2016 115     AST (U/L)   Date Value   07/09/2020 24     ALT (U/L)   Date Value   07/09/2020 19     BUN (mg/dL)   Date Value   07/09/2020 16      (goal A1C is < 7)   (goal LDL is <100) need 30-50% reduction from baseline     BP Readings from Last 3 Encounters:   06/16/20 (!) 94/53   05/12/20 123/75   02/11/20 136/85    (goal /80)      All Future Testing planned in [facility]:      Next Visit Date:  Future Appointments   Date Time Provider Lambert Thomas   12/15/2020  3:40 PM MICHAEL Carpio -  [address]            Patient Active Problem List:     Osteoarthritis of knees     Benign prostatic hyperplasia with nocturia     Essential hypertension     Mild intermittent asthma without complication Statement Selected

## 2021-07-28 DIAGNOSIS — R35.1 BENIGN PROSTATIC HYPERPLASIA WITH NOCTURIA: ICD-10-CM

## 2021-07-28 DIAGNOSIS — N40.1 BENIGN PROSTATIC HYPERPLASIA WITH NOCTURIA: ICD-10-CM

## 2021-07-28 NOTE — TELEPHONE ENCOUNTER
Request for finasteride. Please review and e-scribe to pharmacy listed in chart if appropriate. Thank you.       Next Visit Date: 8/12/2021 with Dr. Tori Pierce, transfer from [facility]      Future Appointments   Date Time Provider Lambert Thomas   8/12/2021  1:00 PM Jim Santos MD [facility] [facility] Maintenance   Topic Date Due    DTaP/Tdap/Td vaccine (1 - Tdap) Never done    Shingles Vaccine (1 of 2) Never done    Potassium monitoring  07/09/2021    Creatinine monitoring  07/09/2021    Flu vaccine (1) 09/01/2021    Annual Wellness Visit (AWV)  09/23/2021    Pneumococcal 65+ years Vaccine  Completed    COVID-19 Vaccine  Completed    Hepatitis A vaccine  Aged Out    Hepatitis B vaccine  Aged Out    Hib vaccine  Aged Out    Meningococcal (ACWY) vaccine  Aged Out       No results found for: LABA1C          ( goal A1C is < 7)   No results found for: LABMICR  LDL Calculated (mg/dL)   Date Value   11/21/2016 115       (goal LDL is <100)   AST (U/L)   Date Value   07/09/2020 24     ALT (U/L)   Date Value   07/09/2020 19     BUN (mg/dL)   Date Value   07/09/2020 16     BP Readings from Last 3 Encounters:   12/15/20 (!) 105/59   06/16/20 (!) 94/53   05/12/20 123/75          (goal 120/80)    All Future Testing planned in [facility]        Patient Active Problem List:     Osteoarthritis of knees     Benign prostatic hyperplasia with nocturia     Essential hypertension     Mild intermittent asthma without complication

## 2021-07-29 RX ORDER — FINASTERIDE 5 MG/1
TABLET, FILM COATED ORAL
Qty: 90 TABLET | Refills: 0 | Status: SHIPPED | OUTPATIENT
Start: 2021-07-29 | End: 2021-10-30

## 2021-08-12 ENCOUNTER — OFFICE VISIT (OUTPATIENT)
Dept: INTERNAL MEDICINE | Age: 81
End: 2021-08-12
Payer: MEDICARE

## 2021-08-12 VITALS
BODY MASS INDEX: 28.66 KG/M2 | SYSTOLIC BLOOD PRESSURE: 124 MMHG | HEART RATE: 61 BPM | DIASTOLIC BLOOD PRESSURE: 62 MMHG | WEIGHT: 172 LBS | HEIGHT: 65 IN

## 2021-08-12 DIAGNOSIS — R35.1 NOCTURIA ASSOCIATED WITH BENIGN PROSTATIC HYPERPLASIA: ICD-10-CM

## 2021-08-12 DIAGNOSIS — I20.1 ANGINA PECTORIS, VARIANT (HCC): ICD-10-CM

## 2021-08-12 DIAGNOSIS — M76.32 ILIOTIBIAL BAND SYNDROME AFFECTING LEFT LOWER LEG: ICD-10-CM

## 2021-08-12 DIAGNOSIS — I10 ESSENTIAL HYPERTENSION: Primary | ICD-10-CM

## 2021-08-12 DIAGNOSIS — N40.1 NOCTURIA ASSOCIATED WITH BENIGN PROSTATIC HYPERPLASIA: ICD-10-CM

## 2021-08-12 DIAGNOSIS — Z00.00 ROUTINE GENERAL MEDICAL EXAMINATION AT A HEALTH CARE FACILITY: ICD-10-CM

## 2021-08-12 DIAGNOSIS — G47.09 OTHER INSOMNIA: ICD-10-CM

## 2021-08-12 PROBLEM — G47.00 INSOMNIA: Status: ACTIVE | Noted: 2021-08-12

## 2021-08-12 PROCEDURE — 99213 OFFICE O/P EST LOW 20 MIN: CPT | Performed by: STUDENT IN AN ORGANIZED HEALTH CARE EDUCATION/TRAINING PROGRAM

## 2021-08-12 PROCEDURE — 1123F ACP DISCUSS/DSCN MKR DOCD: CPT | Performed by: STUDENT IN AN ORGANIZED HEALTH CARE EDUCATION/TRAINING PROGRAM

## 2021-08-12 PROCEDURE — 1036F TOBACCO NON-USER: CPT | Performed by: STUDENT IN AN ORGANIZED HEALTH CARE EDUCATION/TRAINING PROGRAM

## 2021-08-12 PROCEDURE — 4040F PNEUMOC VAC/ADMIN/RCVD: CPT | Performed by: STUDENT IN AN ORGANIZED HEALTH CARE EDUCATION/TRAINING PROGRAM

## 2021-08-12 PROCEDURE — 99211 OFF/OP EST MAY X REQ PHY/QHP: CPT | Performed by: INTERNAL MEDICINE

## 2021-08-12 PROCEDURE — G8417 CALC BMI ABV UP PARAM F/U: HCPCS | Performed by: STUDENT IN AN ORGANIZED HEALTH CARE EDUCATION/TRAINING PROGRAM

## 2021-08-12 PROCEDURE — G8427 DOCREV CUR MEDS BY ELIG CLIN: HCPCS | Performed by: STUDENT IN AN ORGANIZED HEALTH CARE EDUCATION/TRAINING PROGRAM

## 2021-08-12 SDOH — ECONOMIC STABILITY: FOOD INSECURITY: WITHIN THE PAST 12 MONTHS, THE FOOD YOU BOUGHT JUST DIDN'T LAST AND YOU DIDN'T HAVE MONEY TO GET MORE.: NEVER TRUE

## 2021-08-12 SDOH — ECONOMIC STABILITY: FOOD INSECURITY: WITHIN THE PAST 12 MONTHS, YOU WORRIED THAT YOUR FOOD WOULD RUN OUT BEFORE YOU GOT MONEY TO BUY MORE.: NEVER TRUE

## 2021-08-12 ASSESSMENT — PATIENT HEALTH QUESTIONNAIRE - PHQ9
1. LITTLE INTEREST OR PLEASURE IN DOING THINGS: 1
SUM OF ALL RESPONSES TO PHQ QUESTIONS 1-9: 2
SUM OF ALL RESPONSES TO PHQ9 QUESTIONS 1 & 2: 2
SUM OF ALL RESPONSES TO PHQ QUESTIONS 1-9: 2
2. FEELING DOWN, DEPRESSED OR HOPELESS: 1
SUM OF ALL RESPONSES TO PHQ QUESTIONS 1-9: 2

## 2021-08-12 ASSESSMENT — SOCIAL DETERMINANTS OF HEALTH (SDOH): HOW HARD IS IT FOR YOU TO PAY FOR THE VERY BASICS LIKE FOOD, HOUSING, MEDICAL CARE, AND HEATING?: NOT HARD AT ALL

## 2021-08-12 NOTE — PROGRESS NOTES
of occasional nocturia. Patient does not have lack of interest or any mood problem but has poor sleep hygiene. He stated that he sleeps around 2 or 3 AM and gets up at 9 AM.  On further questioning he reported that he stays busy with his computer or books and sleeps late. He denied trouble falling asleep but agrees that he does not sleep sound. And is having issues with his memory. Denied any history of cigarette smoking, smoked pipe for some time in his 25s. Drinks alcohol occasionally. Patient was counseled regarding good sleep hygiene and was advised to avoid fluid intake after 7 PM and avoid TV/computer/laptop or books at sleep time. Patient is recommended to have outpatient sleep study. On physical exam patient has 1+ pitting edema, patient reported that he had normal chemical stress test 3 years ago. Patient does have scoliosis and divarication recti.             Patient Active Problem List   Diagnosis    Osteoarthritis of knees    Benign prostatic hyperplasia with nocturia    Essential hypertension    Mild intermittent asthma without complication    Insomnia       Allergies   Allergen Reactions    Aspirin     Ibuprofen          Current Outpatient Medications   Medication Sig Dispense Refill    finasteride (PROSCAR) 5 MG tablet TAKE 1 TABLET DAILY 90 tablet 0    atenolol (TENORMIN) 25 MG tablet TAKE ONE TABLET BY MOUTH DAILY 90 tablet 1    tamsulosin (FLOMAX) 0.4 MG capsule TAKE ONE CAPSULE BY MOUTH DAILY 90 capsule 1    amLODIPine (NORVASC) 5 MG tablet TAKE 1 TABLET DAILY 90 tablet 1    montelukast (SINGULAIR) 10 MG tablet Take 1 tablet by mouth nightly 90 tablet 1    Misc Natural Products (PROSTATE SUPPORT PO) Take 1 tablet by mouth daily      GNP NORWEGIAN COD LIVER OIL PO Take 1 tablet by mouth daily      Green Tea 315 MG CAPS Take 315 mg by mouth daily      APPLE CIDER VINEGAR PO Take 1,200 mg by mouth daily      melatonin 3 MG TABS tablet Take 3 mg by mouth nightly as tobacco: Never Used   Substance Use Topics    Alcohol use: Yes     Alcohol/week: 4.0 standard drinks     Types: 4 Shots of liquor per week    Drug use: Yes     Comment: CBD        Family History   Problem Relation Age of Onset    Cancer Mother 80        colon    Heart Disease Mother     Heart Disease Father     Other Father 70        ALS      ________________________________________________________________________  Review of Systems:  CONSTITUTIONAL: Denies: fever, chills  PSYCH: Denies: anxiety, depression  ALLERGIES: Denies: urticaria  EYES: Denies: blurry vision, decreased vision, photophobia  ENT: Denies: sore throat, nasal congestion  CARDIOVASCULAR: Denies: chest pain, dyspnea on exertion  RESPIRATORY: Denies: cough, hemoptysis, shortness of breath  GI: Denies: Denies: abdominal pain, flank pain  : Denies: Denies: dysuria, frequency/urgency  NEURO: Denies: dizzy/vertigo, headache  MUSCULOSKELETAL: Denies: back pain, joint pain  SKIN: Denies: rash, itching  ________________________________________________________________________  Physical Exam:  Vitals:    08/12/21 1303   BP: 124/62   Site: Left Upper Arm   Position: Sitting   Cuff Size: Medium Adult   Pulse: 61   Weight: 172 lb (78 kg)   Height: 5' 5\" (1.651 m)     BP Readings from Last 3 Encounters:   08/12/21 124/62   12/15/20 (!) 105/59   06/16/20 (!) 94/53        Physical Exam  General appearance - alert, well appearing, and in no distress and oriented to person, place, and time  Mental status - alert, oriented to person, place, and time  Eyes - left eye normal, right eye normal  Mouth - mucous membranes moist, pharynx normal without lesions  Chest - clear to auscultation, no wheezes, rales or rhonchi, symmetric air entry  Heart - normal rate, regular rhythm, normal S1, S2, no murmurs, rubs, clicks or gallops  Abdomen - soft, nontender, nondistended, no masses or organomegaly  divarication recti.   Back exam - full range of motion, no tenderness, palpable spasm or pain on motion, scoliosis noted   Neurological - alert, oriented, normal speech, no focal findings or movement disorder noted  Musculoskeletal - no muscular tenderness noted, decreased range of motion b/l knee  Extremities - pedal edema 1 +    ________________________________________________________________________  Diagnostic findings:  CBC:No results found for: WBC, HGB, PLT    BMP:    Lab Results   Component Value Date     07/09/2020    K 4.3 07/09/2020     07/09/2020    CO2 26 07/09/2020    BUN 16 07/09/2020    CREATININE 0.81 07/09/2020    GLUCOSE 88 07/09/2020    GLUCOSE 86 04/15/2019       HEMOGLOBIN A1C: No results found for: LABA1C    FASTING LIPID PANEL:  Lab Results   Component Value Date    CHOL 188 11/21/2016    HDL 54 11/21/2016    TRIG 95 11/21/2016     ________________________________________________________________________  Health Maintenance:  Health Maintenance Due   Topic Date Due    DTaP/Tdap/Td vaccine (1 - Tdap) Never done    Shingles Vaccine (1 of 2) Never done    Potassium monitoring  07/09/2021    Creatinine monitoring  07/09/2021       ________________________________________________________________________  Assessment and Plan:  Bard Hunt was seen today for establish care and health maintenance. Diagnoses and all orders for this visit:    Essential hypertension    Other insomnia    Nocturia associated with benign prostatic hyperplasia    Routine general medical examination at a health care facility      ________________________________________________________________________  Follow up and instructions:  · No follow-ups on file. · Bard Hunt received counseling on the following healthy behaviors: nutrition, exercise, medication adherence and sleep hygine    · Discussed use, benefit, and side effects of prescribed medications. Barriers to medication compliance addressed. All patient questions answered. Pt voiced understanding.      · Patient given educational materials - see patient instructions    Yecneia Coyle MD  Internal Medicine Resident, PGY-2  [facility]         8/12/2021, 1:51 PM      This note is created with the assistance of a speech-recognition program. While intending to generate a document that actually reflects the content of the visit, the document can still have some mistakes which may not have been identified and corrected by editing.

## 2021-08-12 NOTE — PATIENT INSTRUCTIONS
Return To Clinic around 12/12/2021 for 6 month follow up. Patient was added to wait list. Patient will be contacted by office to schedule upcoming appointment with the physician. The medication list included in this document is our record of what you are currently taking, including any changes that were made at today's visit. If you find any differences when compared to your medications at home, or have any questions that were not answered at your visit, please contact the office. It is very important for your care that you keep your appointment. If for some reason you are unable to keep your appointment, it is equally important that you call our office at [phone] to cancel your appointment and reschedule. Failure to do so may result in your termination from our practice. After Visit Summary mailed to the patient along with appointment card and all other paperwork/orders. LABORATORY INSTRUCTIONS    Your doctor has ordered blood or urine testing. You can get this testing done at the Lab located on the first floor of the [facility], or at any other [facility]. Please stop at Main Registration, before going to the lab, as you must be registered first.     Please get this lab done before your next visit.     You may eat or drink before this test.    -JOYCE Casanova

## 2021-09-07 RX ORDER — MONTELUKAST SODIUM 10 MG/1
10 TABLET ORAL NIGHTLY
Qty: 90 TABLET | Refills: 1 | Status: SHIPPED | OUTPATIENT
Start: 2021-09-07 | End: 2022-03-21

## 2021-09-07 NOTE — TELEPHONE ENCOUNTER
Request for medication refill, montelukast    Next Visit Date:pt on wait list til 1/12/22, last seen 8/12/21  No future appointments.     Health Maintenance   Topic Date Due    DTaP/Tdap/Td vaccine (1 - Tdap) Never done    Shingles Vaccine (1 of 2) Never done    Potassium monitoring  07/09/2021    Creatinine monitoring  07/09/2021    Flu vaccine (1) 09/01/2021    Annual Wellness Visit (AWV)  09/23/2021    Pneumococcal 65+ years Vaccine  Completed    COVID-19 Vaccine  Completed    Hepatitis A vaccine  Aged Out    Hepatitis B vaccine  Aged Out    Hib vaccine  Aged Out    Meningococcal (ACWY) vaccine  Aged Out       No results found for: LABA1C          ( goal A1C is < 7)   No results found for: LABMICR  LDL Calculated (mg/dL)   Date Value   11/21/2016 115       (goal LDL is <100)   AST (U/L)   Date Value   07/09/2020 24     ALT (U/L)   Date Value   07/09/2020 19     BUN (mg/dL)   Date Value   07/09/2020 16     BP Readings from Last 3 Encounters:   08/12/21 124/62   12/15/20 (!) 105/59   06/16/20 (!) 94/53          (goal 120/80)    All Future Testing planned in [facility]  Lab Frequency Next Occurrence   Basic Metabolic Panel Once 24/59/5771   CBC With Auto Differential Once 11/20/2021         Patient Active Problem List:     Osteoarthritis of knees     Benign prostatic hyperplasia with nocturia     Essential hypertension     Mild intermittent asthma without complication     Insomnia     Iliotibial band syndrome affecting left lower leg     Angina pectoris, variant ([address]

## 2021-09-21 ENCOUNTER — TELEPHONE (OUTPATIENT)
Dept: INTERNAL MEDICINE | Age: 81
End: 2021-09-21

## 2021-09-21 DIAGNOSIS — M17.10 ARTHRITIS OF KNEE: Primary | ICD-10-CM

## 2021-09-21 DIAGNOSIS — I10 ESSENTIAL HYPERTENSION: ICD-10-CM

## 2021-09-21 NOTE — TELEPHONE ENCOUNTER
Refill request for Amlodipine. If appropriate please send medication(s) to patients pharmacy. Next appt: Patient is currently on wait list for provider.     Last appt: 8/12/2021    Health Maintenance   Topic Date Due    DTaP/Tdap/Td vaccine (1 - Tdap) Never done    Shingles Vaccine (1 of 2) Never done    Potassium monitoring  07/09/2021    Creatinine monitoring  07/09/2021    Flu vaccine (1) 09/01/2021    Annual Wellness Visit (AWV)  09/23/2021    Pneumococcal 65+ years Vaccine  Completed    COVID-19 Vaccine  Completed    Hepatitis A vaccine  Aged Out    Hepatitis B vaccine  Aged Out    Hib vaccine  Aged Out    Meningococcal (ACWY) vaccine  Aged Out       No results found for: LABA1C          ( goal A1C is < 7)   No results found for: LABMICR  LDL Calculated (mg/dL)   Date Value   11/21/2016 115       (goal LDL is <100)   AST (U/L)   Date Value   07/09/2020 24     ALT (U/L)   Date Value   07/09/2020 19     BUN (mg/dL)   Date Value   07/09/2020 16     BP Readings from Last 3 Encounters:   08/12/21 124/62   12/15/20 (!) 105/59   06/16/20 (!) 94/53          (goal 120/80)          Patient Active Problem List:     Osteoarthritis of knees     Benign prostatic hyperplasia with nocturia     Essential hypertension     Mild intermittent asthma without complication     Insomnia     Iliotibial band syndrome affecting left lower leg     Angina pectoris, variant ([facility] [address]

## 2021-09-21 NOTE — TELEPHONE ENCOUNTER
Pt calling he got a letter in the mail stating his handicap placard is about to . He would like a new script. He needs 2 because he has 2 cars and he likes to keep one in each so he doesn't have to worry about losing or forgetting one. Scripts pended, they need to be printed in office.

## 2021-09-22 RX ORDER — AMLODIPINE BESYLATE 5 MG/1
TABLET ORAL
Qty: 90 TABLET | Refills: 1 | OUTPATIENT
Start: 2021-09-22

## 2021-09-23 LAB
BASOPHILS ABSOLUTE: 0 /ΜL
BASOPHILS RELATIVE PERCENT: 0.4 %
BUN BLDV-MCNC: 25 MG/DL
CALCIUM SERPL-MCNC: 9.4 MG/DL
CHLORIDE BLD-SCNC: 107 MMOL/L
CO2: 29 MMOL/L
CREAT SERPL-MCNC: 1.1 MG/DL
EOSINOPHILS ABSOLUTE: 0.3 /ΜL
EOSINOPHILS RELATIVE PERCENT: 4.2 %
GFR CALCULATED: >60
GLUCOSE BLD-MCNC: 95 MG/DL
HCT VFR BLD CALC: 46.5 % (ref 41–53)
HEMOGLOBIN: 15.7 G/DL (ref 13.5–17.5)
LYMPHOCYTES ABSOLUTE: 1.6 /ΜL
LYMPHOCYTES RELATIVE PERCENT: 24.3 %
MCH RBC QN AUTO: 30.7 PG
MCHC RBC AUTO-ENTMCNC: 33.7 G/DL
MCV RBC AUTO: 91 FL
MONOCYTES ABSOLUTE: 0.4 /ΜL
MONOCYTES RELATIVE PERCENT: 6.5 %
NEUTROPHILS ABSOLUTE: 4.2 /ΜL
NEUTROPHILS RELATIVE PERCENT: 64.3 %
PDW BLD-RTO: 13.6 %
PLATELET # BLD: 200 K/ΜL
PMV BLD AUTO: 8.8 FL
POTASSIUM SERPL-SCNC: 4.4 MMOL/L
RBC # BLD: 5.11 10^6/ΜL
SODIUM BLD-SCNC: 143 MMOL/L
WBC # BLD: 6.6 10^3/ML

## 2021-09-24 NOTE — TELEPHONE ENCOUNTER
Spoke with patient he stated he was not told this medication was discontinued he stated his BP isn't well controlled as well. Advised patient to take BP over the weekend and contact office with the results from home. Patient asked me to make a note in his chart regarding phone call.

## 2021-10-29 DIAGNOSIS — N40.1 BENIGN PROSTATIC HYPERPLASIA WITH NOCTURIA: ICD-10-CM

## 2021-10-29 DIAGNOSIS — R35.1 BENIGN PROSTATIC HYPERPLASIA WITH NOCTURIA: ICD-10-CM

## 2021-10-29 NOTE — TELEPHONE ENCOUNTER
Request for Proscar. Next Visit Date:  No future appointments.     Health Maintenance   Topic Date Due    DTaP/Tdap/Td vaccine (1 - Tdap) Never done    Shingles Vaccine (1 of 2) Never done    Potassium monitoring  07/09/2021    Creatinine monitoring  07/09/2021    Flu vaccine (1) 09/01/2021    Annual Wellness Visit (AWV)  09/23/2021    Pneumococcal 65+ years Vaccine  Completed    COVID-19 Vaccine  Completed    Hepatitis A vaccine  Aged Out    Hepatitis B vaccine  Aged Out    Hib vaccine  Aged Out    Meningococcal (ACWY) vaccine  Aged Out       No results found for: LABA1C          ( goal A1C is < 7)   No results found for: LABMICR  LDL Calculated (mg/dL)   Date Value   11/21/2016 115       (goal LDL is <100)   AST (U/L)   Date Value   07/09/2020 24     ALT (U/L)   Date Value   07/09/2020 19     BUN (mg/dL)   Date Value   07/09/2020 16     BP Readings from Last 3 Encounters:   08/12/21 124/62   12/15/20 (!) 105/59   06/16/20 (!) 94/53          (goal 120/80)    All Future Testing planned in [facility]  Lab Frequency Next Occurrence   Basic Metabolic Panel Once 56/77/2874   CBC With Auto Differential Once 11/20/2021         Patient Active Problem List:     Osteoarthritis of knees     Benign prostatic hyperplasia with nocturia     Essential hypertension     Mild intermittent asthma without complication     Insomnia     Iliotibial band syndrome affecting left lower leg     Angina pectoris, variant ([address]

## 2021-10-30 RX ORDER — FINASTERIDE 5 MG/1
TABLET, FILM COATED ORAL
Qty: 90 TABLET | Refills: 0 | Status: SHIPPED | OUTPATIENT
Start: 2021-10-30 | End: 2021-11-30 | Stop reason: SDUPTHER

## 2021-11-30 DIAGNOSIS — N40.1 BENIGN PROSTATIC HYPERPLASIA WITH NOCTURIA: ICD-10-CM

## 2021-11-30 DIAGNOSIS — R35.1 BENIGN PROSTATIC HYPERPLASIA WITH NOCTURIA: ICD-10-CM

## 2021-11-30 RX ORDER — FINASTERIDE 5 MG/1
TABLET, FILM COATED ORAL
Qty: 90 TABLET | Refills: 0 | Status: SHIPPED | OUTPATIENT
Start: 2021-11-30 | End: 2022-02-07 | Stop reason: SDUPTHER

## 2021-11-30 RX ORDER — TAMSULOSIN HYDROCHLORIDE 0.4 MG/1
CAPSULE ORAL
Qty: 90 CAPSULE | Refills: 1 | Status: SHIPPED | OUTPATIENT
Start: 2021-11-30 | End: 2022-06-16

## 2021-11-30 NOTE — TELEPHONE ENCOUNTER
Request for medication refill, proscar, amlodipine, last refill 1/19/21 for amlodipine      Next Visit Date: 12/16/21  Future Appointments   Date Time Provider Lambert Thomas   12/16/2021  3:00 PM Jim Verma MD [facility] [address] Maintenance   Topic Date Due    DTaP/Tdap/Td vaccine (1 - Tdap) Never done    Shingles Vaccine (1 of 2) Never done    Potassium monitoring  07/09/2021    Creatinine monitoring  07/09/2021    Flu vaccine (1) 09/01/2021    COVID-19 Vaccine (3 - Booster for Moderna series) 09/06/2021    Annual Wellness Visit (AWV)  09/23/2021    Pneumococcal 65+ years Vaccine  Completed    Hepatitis A vaccine  Aged Out    Hepatitis B vaccine  Aged Out    Hib vaccine  Aged Out    Meningococcal (ACWY) vaccine  Aged Out       No results found for: LABA1C          ( goal A1C is < 7)   No results found for: LABMICR  LDL Calculated (mg/dL)   Date Value   11/21/2016 115       (goal LDL is <100)   AST (U/L)   Date Value   07/09/2020 24     ALT (U/L)   Date Value   07/09/2020 19     BUN (mg/dL)   Date Value   07/09/2020 16     BP Readings from Last 3 Encounters:   08/12/21 124/62   12/15/20 (!) 105/59   06/16/20 (!) 94/53          (goal 120/80)    All Future Testing planned in [facility]  Lab Frequency Next Occurrence   Basic Metabolic Panel Once 28/99/0535   CBC With Auto Differential Once 02/28/2022         Patient Active Problem List:     Osteoarthritis of knees     Benign prostatic hyperplasia with nocturia     Essential hypertension     Mild intermittent asthma without complication     Insomnia     Iliotibial band syndrome affecting left lower leg     Angina pectoris, variant ([address]

## 2021-11-30 NOTE — TELEPHONE ENCOUNTER
Request for medication refill, tamsulosin. Next Visit Date:pt on wait list til 1/12/22, last seen 11/12/21  No future appointments.     Health Maintenance   Topic Date Due    DTaP/Tdap/Td vaccine (1 - Tdap) Never done    Shingles Vaccine (1 of 2) Never done    Potassium monitoring  07/09/2021    Creatinine monitoring  07/09/2021    Flu vaccine (1) 09/01/2021    COVID-19 Vaccine (3 - Booster for Moderna series) 09/06/2021    Annual Wellness Visit (AWV)  09/23/2021    Pneumococcal 65+ years Vaccine  Completed    Hepatitis A vaccine  Aged Out    Hepatitis B vaccine  Aged Out    Hib vaccine  Aged Out    Meningococcal (ACWY) vaccine  Aged Out       No results found for: LABA1C          ( goal A1C is < 7)   No results found for: LABMICR  LDL Calculated (mg/dL)   Date Value   11/21/2016 115       (goal LDL is <100)   AST (U/L)   Date Value   07/09/2020 24     ALT (U/L)   Date Value   07/09/2020 19     BUN (mg/dL)   Date Value   07/09/2020 16     BP Readings from Last 3 Encounters:   08/12/21 124/62   12/15/20 (!) 105/59   06/16/20 (!) 94/53          (goal 120/80)    All Future Testing planned in [facility]  Lab Frequency Next Occurrence   Basic Metabolic Panel Once 82/37/1763   CBC With Auto Differential Once 02/28/2022         Patient Active Problem List:     Osteoarthritis of knees     Benign prostatic hyperplasia with nocturia     Essential hypertension     Mild intermittent asthma without complication     Insomnia     Iliotibial band syndrome affecting left lower leg     Angina pectoris, variant ([address]

## 2021-12-06 DIAGNOSIS — N40.1 NOCTURIA ASSOCIATED WITH BENIGN PROSTATIC HYPERPLASIA: ICD-10-CM

## 2021-12-06 DIAGNOSIS — I10 ESSENTIAL HYPERTENSION: ICD-10-CM

## 2021-12-06 DIAGNOSIS — R35.1 NOCTURIA ASSOCIATED WITH BENIGN PROSTATIC HYPERPLASIA: ICD-10-CM

## 2021-12-06 DIAGNOSIS — Z00.00 ROUTINE GENERAL MEDICAL EXAMINATION AT A HEALTH CARE FACILITY: ICD-10-CM

## 2021-12-06 RX ORDER — ATENOLOL 25 MG/1
TABLET ORAL
Qty: 90 TABLET | Refills: 1 | Status: SHIPPED | OUTPATIENT
Start: 2021-12-06 | End: 2022-05-19

## 2021-12-06 NOTE — TELEPHONE ENCOUNTER
Request for  Medication refill, atenolol.       Next Visit Date:12/16/21  Future Appointments   Date Time Provider Lambert Krameri   12/16/2021  3:00 PM Jim Louis MD [facility] [address] Maintenance   Topic Date Due    DTaP/Tdap/Td vaccine (1 - Tdap) Never done    Shingles Vaccine (1 of 2) Never done    Potassium monitoring  07/09/2021    Creatinine monitoring  07/09/2021    Flu vaccine (1) 09/01/2021    COVID-19 Vaccine (3 - Booster for Moderna series) 09/06/2021    Annual Wellness Visit (AWV)  09/23/2021    Pneumococcal 65+ years Vaccine  Completed    Hepatitis A vaccine  Aged Out    Hepatitis B vaccine  Aged Out    Hib vaccine  Aged Out    Meningococcal (ACWY) vaccine  Aged Out       No results found for: LABA1C          ( goal A1C is < 7)   No results found for: LABMICR  LDL Calculated (mg/dL)   Date Value   11/21/2016 115       (goal LDL is <100)   AST (U/L)   Date Value   07/09/2020 24     ALT (U/L)   Date Value   07/09/2020 19     BUN (mg/dL)   Date Value   07/09/2020 16     BP Readings from Last 3 Encounters:   08/12/21 124/62   12/15/20 (!) 105/59   06/16/20 (!) 94/53          (goal 120/80)    All Future Testing planned in [facility]  Lab Frequency Next Occurrence   Basic Metabolic Panel Once 12/51/4406   CBC With Auto Differential Once 02/28/2022         Patient Active Problem List:     Osteoarthritis of knees     Benign prostatic hyperplasia with nocturia     Essential hypertension     Mild intermittent asthma without complication     Insomnia     Iliotibial band syndrome affecting left lower leg     Angina pectoris, variant ([address]

## 2021-12-16 ENCOUNTER — OFFICE VISIT (OUTPATIENT)
Dept: INTERNAL MEDICINE | Age: 81
End: 2021-12-16
Payer: MEDICARE

## 2021-12-16 VITALS
SYSTOLIC BLOOD PRESSURE: 148 MMHG | WEIGHT: 166.8 LBS | TEMPERATURE: 97.9 F | BODY MASS INDEX: 27.76 KG/M2 | HEART RATE: 60 BPM | DIASTOLIC BLOOD PRESSURE: 75 MMHG

## 2021-12-16 DIAGNOSIS — L98.9 SKIN LESION OF LEFT UPPER EXTREMITY: ICD-10-CM

## 2021-12-16 DIAGNOSIS — I10 ESSENTIAL HYPERTENSION: Primary | ICD-10-CM

## 2021-12-16 DIAGNOSIS — N40.1 BENIGN PROSTATIC HYPERPLASIA WITH NOCTURIA: ICD-10-CM

## 2021-12-16 DIAGNOSIS — R35.1 BENIGN PROSTATIC HYPERPLASIA WITH NOCTURIA: ICD-10-CM

## 2021-12-16 PROCEDURE — G8482 FLU IMMUNIZE ORDER/ADMIN: HCPCS | Performed by: STUDENT IN AN ORGANIZED HEALTH CARE EDUCATION/TRAINING PROGRAM

## 2021-12-16 PROCEDURE — 1036F TOBACCO NON-USER: CPT | Performed by: STUDENT IN AN ORGANIZED HEALTH CARE EDUCATION/TRAINING PROGRAM

## 2021-12-16 PROCEDURE — 90686 IIV4 VACC NO PRSV 0.5 ML IM: CPT | Performed by: STUDENT IN AN ORGANIZED HEALTH CARE EDUCATION/TRAINING PROGRAM

## 2021-12-16 PROCEDURE — 4040F PNEUMOC VAC/ADMIN/RCVD: CPT | Performed by: STUDENT IN AN ORGANIZED HEALTH CARE EDUCATION/TRAINING PROGRAM

## 2021-12-16 PROCEDURE — 1123F ACP DISCUSS/DSCN MKR DOCD: CPT | Performed by: STUDENT IN AN ORGANIZED HEALTH CARE EDUCATION/TRAINING PROGRAM

## 2021-12-16 PROCEDURE — G8417 CALC BMI ABV UP PARAM F/U: HCPCS | Performed by: STUDENT IN AN ORGANIZED HEALTH CARE EDUCATION/TRAINING PROGRAM

## 2021-12-16 PROCEDURE — 99213 OFFICE O/P EST LOW 20 MIN: CPT | Performed by: STUDENT IN AN ORGANIZED HEALTH CARE EDUCATION/TRAINING PROGRAM

## 2021-12-16 PROCEDURE — G8427 DOCREV CUR MEDS BY ELIG CLIN: HCPCS | Performed by: STUDENT IN AN ORGANIZED HEALTH CARE EDUCATION/TRAINING PROGRAM

## 2021-12-16 PROCEDURE — 99211 OFF/OP EST MAY X REQ PHY/QHP: CPT | Performed by: INTERNAL MEDICINE

## 2021-12-16 RX ORDER — BLOOD PRESSURE TEST KIT
1 KIT MISCELLANEOUS DAILY
Qty: 1 KIT | Refills: 0 | Status: SHIPPED | OUTPATIENT
Start: 2021-12-16

## 2021-12-16 NOTE — PROGRESS NOTES
Visit Information    Have you changed or started any medications since your last visit including any over-the-counter medicines, vitamins, or herbal medicines? no   Have you stopped taking any of your medications? Is so, why? -  no  Are you having any side effects from any of your medications? - no    Have you seen any other physician or provider since your last visit?  no   Have you had any other diagnostic tests since your last visit?  no   Have you been seen in the emergency room and/or had an admission in a hospital since we last saw you?  no   Have you had your routine dental cleaning in the past 6 months?  yes -      Do you have an active MyChart account? If no, what is the barrier?   No:     Patient Care Team:  Nury Hinson MD as PCP - General (Internal Medicine)  Radha Smith MD as PCP - [facility]    Medical History Review  Past Medical, Family, and Social History reviewed and does not contribute to the patient presenting condition    Health Maintenance   Topic Date Due    DTaP/Tdap/Td vaccine (1 - Tdap) Never done    Shingles Vaccine (1 of 2) Never done    Flu vaccine (1) 09/01/2021    COVID-19 Vaccine (3 - Booster for Valentino Flattery series) 09/06/2021    Annual Wellness Visit (AWV)  09/23/2021    Potassium monitoring  09/23/2022    Creatinine monitoring  09/23/2022    Pneumococcal 65+ years Vaccine  Completed    Hepatitis A vaccine  Aged Out    Hepatitis B vaccine  Aged Out    Hib vaccine  Aged Out    Meningococcal (ACWY) vaccine  Aged Out

## 2021-12-16 NOTE — PATIENT INSTRUCTIONS
Follow-up appointment scheduled for 3/17/22 at 2:30p  AVS given to patient.     Referral to Urology was placed, summary of care printed and faxed to office, phone numbers given to pt, they will contact office for an appt    DME: Blood Pressure Kit      jw

## 2021-12-16 NOTE — PROGRESS NOTES
[facility] [address] [address]  Dept: [phone]  Dept Fax: [phone]    Office Progress/Follow Up Note  Date of patient's visit: 12/16/2021  Patient's Name:  Giovanny Clark YOB: 1940            Patient Care Team:  Hussain Chavez MD as PCP - General (Internal Medicine)  Paul Ross MD as PCP - [facility]  ________________________________________________________________________      Reason for Visit: Routine outpatient follow up  ________________________________________________________________________  Chief Complaint:  1 Month Follow-Up (patient states he is feeling bad today), Health Maintenance (patient states he will get tdap shot on next visit), and Health Maintenance (patient refused shingles shot)    ________________________________________________________________________  History of Presenting Illness:  History was obtained from: patient, electronic medical record. Giovanny Clark is a 80 y.o. is here for a routine follow-up visit. Patient was last seen on 8/12/2021. He has history of hypertension,during his last visit patient had 1+ pitting edema, amlodipine was discontinued and patient was advised to monitor his blood pressure. Today patient is complaining that his blood pressure readings were in 180s on multiple occasions when he checked at pharmacy, he does not have a blood pressure monitoring kit at home. In the office his blood pressure is 146/81. Repeat blood pressure 1 4775. currently is taking atenolol 25 mg daily, patient has a very active lifestyle and reported that he does 40 push-ups daily. will prescribe blood pressure monitoring kit, advised patient to monitor his blood pressure daily and give us a call back in a week. Got COVID booster on Monday 12/13/21. Got his flu vaccine today. Denied Tdap.     Today he is also complaining of concern for a skin lesion on his left arm    Asking for recommendations for Urology/ surgery for BPH, as he is complaining of his symptoms getting worse. Patient was earlier recommended for surgery but he lost the prescription. He is on Flomax and finasteride. Has history of bilateral knee replacement surgery, used to follow Dr. Bubba Doty for the same. Also has history of asthma and he takes montelukast 10 mg nightly and uses Advair inhaler twice daily. Patient does not have good sleep hygiene, reported that he gets up at 10 AM in the morning. Patient counseled regarding the same. Patient was recommended for outpatient sleep study during his last office visit.      Patient Active Problem List   Diagnosis    Osteoarthritis of knees    Benign prostatic hyperplasia with nocturia    Essential hypertension    Mild intermittent asthma without complication    Insomnia    Iliotibial band syndrome affecting left lower leg    Angina pectoris, variant (HCC)       Allergies   Allergen Reactions    Aspirin     Ibuprofen          Current Outpatient Medications   Medication Sig Dispense Refill    Blood Pressure KIT 1 each by Does not apply route daily 1 kit 0    atenolol (TENORMIN) 25 MG tablet TAKE ONE TABLET BY MOUTH DAILY 90 tablet 1    tamsulosin (FLOMAX) 0.4 MG capsule TAKE ONE CAPSULE BY MOUTH DAILY 90 capsule 1    finasteride (PROSCAR) 5 MG tablet TAKE ONE TABLET BY MOUTH DAILY 90 tablet 0    Handicap Placard MISC by Does not apply route Expires 5 years  Dx Osteoarthritis knee 1 each 0    Handicap Placard MISC by Does not apply route Expires : 5 years  Dx Osteoarthritis Knee 1 each 0    montelukast (SINGULAIR) 10 MG tablet Take 1 tablet by mouth nightly 90 tablet 1    Misc Natural Products (PROSTATE SUPPORT PO) Take 1 tablet by mouth daily      GNP NORWEGIAN COD LIVER OIL PO Take 1 tablet by mouth daily      Green Tea 315 MG CAPS Take 315 mg by mouth daily      Flaxseed, Linseed, (FLAXSEED OIL) 1200 MG CAPS Take 1,200 mg by mouth daily      APPLE CIDER VINEGAR PO Take 1,200 mg by mouth daily      vitamin D (CHOLECALCIFEROL) 25 MCG (1000 UT) TABS tablet Take 1,000 Units by mouth daily 1/2 tablet       melatonin 3 MG TABS tablet Take 3 mg by mouth nightly as needed (1/2 tablet)      VITAMIN E NATURAL PO Take 180 mg by mouth daily      coenzyme Q10 100 MG CAPS capsule Take 100 mg by mouth daily      TURMERIC PO Take 1 tablet by mouth daily       vitamin C (ASCORBIC ACID) 500 MG tablet Take 250 mg by mouth daily      Omega-3 Fatty Acids (FISH OIL) 1360 MG CAPS Take 1,360 mg by mouth daily      fluticasone-salmeterol (ADVAIR DISKUS) 250-50 MCG/DOSE AEPB Inhale 1 puff into the lungs 2 times daily 3 each 1    albuterol sulfate HFA (PROAIR HFA) 108 (90 Base) MCG/ACT inhaler 2 puffs inhaled every 4-6 hours as needed for cough, wheeze, chest tightness or shortness of breath 1 Inhaler 6    fluticasone (VERAMYST) 27.5 MCG/SPRAY nasal spray 2 sprays to each nostril once daily 1 Bottle 11    acetaminophen (TYLENOL) 500 MG tablet Take 1 tablet by mouth every 8 hours as needed for Pain 90 tablet 5    Minoxidil (ROGAINE MENS EX) Apply  topically.  vitamin B-12 (CYANOCOBALAMIN) 1000 MCG tablet Take 1,000 mcg by mouth daily Takes 1/2 tablet daily      Multiple Vitamins-Minerals (COMPLETE SENIOR PO) Take by mouth Takes 1/2 tablet daily      Selenium 200 MCG TABS Take  by mouth. Half tab daily       vitamin D (ERGOCALCIFEROL) 400 UNITS CAPS Take 400 Units by mouth daily Takes 1/2 tablet daily      calcium & magnesium carbonates (MYLANTA) per tablet Take  by mouth daily. Half tab       SAW PALMETTO by Does not apply route. No current facility-administered medications for this visit. Social History     Tobacco Use    Smoking status: Never Smoker    Smokeless tobacco: Never Used   Substance Use Topics    Alcohol use: Yes     Alcohol/week: 4.0 standard drinks     Types: 4 Shots of liquor per week    Drug use:  Yes Comment: CBD        Family History   Problem Relation Age of Onset    Cancer Mother 80        colon    Heart Disease Mother     Heart Disease Father     Other Father 70        ALS      ________________________________________________________________________  Review of Systems:  CONSTITUTIONAL: Denies: fever, chills  PSYCH: Denies: anxiety, depression  ALLERGIES: Denies: urticaria  EYES: Denies: blurry vision, decreased vision, photophobia  ENT: Denies: sore throat, nasal congestion  CARDIOVASCULAR: Denies: chest pain, dyspnea on exertion  RESPIRATORY: Denies: cough, hemoptysis, shortness of breath  GI: Denies: Denies: abdominal pain, flank pain  : Denies: Denies: dysuria, frequency/urgency  NEURO: Denies: dizzy/vertigo, headache  MUSCULOSKELETAL: Denies: back pain,c/o knee joint pain  SKIN: Denies: rash, itching  ________________________________________________________________________  Physical Exam:  Vitals:    12/16/21 1512 12/16/21 1605   BP: (!) 146/81 (!) 148/75   Site: Left Upper Arm    Position: Sitting    Cuff Size: Medium Adult    Pulse: 60    Temp: 97.9 °F (36.6 °C)    TempSrc: Temporal    Weight: 166 lb 12.8 oz (75.7 kg)      BP Readings from Last 3 Encounters:   12/16/21 (!) 148/75   08/12/21 124/62   12/15/20 (!) 105/59        Physical Exam  General appearance - alert, well appearing, and in no distress and oriented to person, place, and time  Mental status - alert, oriented to person, place, and time  Eyes - pupils equal and reactive, extraocular eye movements intact, wears prescription glasses  Ears - right ear normal, left ear normal  Mouth - mucous membranes moist, pharynx normal without lesions and multiple carries  Neck - supple, no significant adenopathy  Chest - clear to auscultation, no wheezes, rales or rhonchi, symmetric air entry  Heart - normal rate, regular rhythm, normal S1, S2, no murmurs, rubs, clicks or gallops  Abdomen - soft, nontender, nondistended, no masses or organomegaly  Neurological - alert, oriented, normal speech, no focal findings or movement disorder noted  Musculoskeletal - no joint tenderness, deformity or swelling, b/l knee pain s/p knee replacement. Extremities - peripheral pulses normal, no pedal edema, no clubbing or cyanosis    ________________________________________________________________________  Diagnostic findings:  CBC:  Lab Results   Component Value Date    WBC 6.6 09/23/2021    HGB 15.7 09/23/2021     09/23/2021       BMP:    Lab Results   Component Value Date     09/23/2021    K 4.4 09/23/2021     09/23/2021    CO2 29 09/23/2021    BUN 25 09/23/2021    CREATININE 1.10 09/23/2021    GLUCOSE 95 09/23/2021    GLUCOSE 86 04/15/2019       HEMOGLOBIN A1C: No results found for: LABA1C    FASTING LIPID PANEL:  Lab Results   Component Value Date    CHOL 188 11/21/2016    HDL 54 11/21/2016    TRIG 95 11/21/2016     ________________________________________________________________________  Health Maintenance:  Health Maintenance Due   Topic Date Due    DTaP/Tdap/Td vaccine (1 - Tdap) Never done    Shingles Vaccine (1 of 2) Never done   [employer] Visit (AWV)  09/23/2021       ________________________________________________________________________  Assessment and Plan:  Margarita Stanley was seen today for 1 month follow-up, health maintenance and health maintenance. Diagnoses and all orders for this visit:    Essential hypertension  -     Blood Pressure KIT; 1 each by Does not apply route daily. Continue atenolol.     Skin lesion of left upper extremity        -    Recommended to see dermatologist outpatient    Benign prostatic hyperplasia with nocturia  -     AFL - Prasanth Curran MD, Urology, [facility]    Other orders  -     INFLUENZA, QUADV, 3 YRS AND OLDER, IM PF, PREFILL SYR OR SDV, 0.5ML (AFLURIA QUADV, PF)         ________________________________________________________________________  Follow up and instructions:  Return in about 3 months (around 3/16/2022). · Linh Win received counseling on the following healthy behaviors: nutrition, exercise, medication adherence and blood pressure monitoring. · Discussed use, benefit, and side effects of prescribed medications. Barriers to medication compliance addressed. All patient questions answered. Pt voiced understanding. · Patient given educational materials - see patient instructions    Oliverio Merida MD  Internal Medicine Resident, PGY-2  [address] [facility]         12/16/2021, 4:20 PM      This note is created with the assistance of a speech-recognition program. While intending to generate a document that actually reflects the content of the visit, the document can still have some mistakes which may not have been identified and corrected by editing.

## 2021-12-16 NOTE — PROGRESS NOTES
Attending Physician Statement  I have discussed the care of Cathy Baxter, including pertinent history and exam findings,  with the resident. I have reviewed the key elements of all parts of the encounter with the resident. I agree with the assessment, plan and orders as documented by the resident.   (GE Modifier)

## 2022-02-07 DIAGNOSIS — R35.1 BENIGN PROSTATIC HYPERPLASIA WITH NOCTURIA: ICD-10-CM

## 2022-02-07 DIAGNOSIS — N40.1 BENIGN PROSTATIC HYPERPLASIA WITH NOCTURIA: ICD-10-CM

## 2022-02-07 RX ORDER — FINASTERIDE 5 MG/1
TABLET, FILM COATED ORAL
Qty: 90 TABLET | Refills: 0 | Status: SHIPPED | OUTPATIENT
Start: 2022-02-07 | End: 2022-03-21

## 2022-02-07 NOTE — TELEPHONE ENCOUNTER
Request for medication refill, finasteride.       Next Visit Date:3/17/22  Future Appointments   Date Time Provider Lambert Thomas   3/17/2022  2:30 PM Jim Forde MD [facility] [facility]   Topic Date Due    DTaP/Tdap/Td vaccine (1 - Tdap) Never done    Shingles Vaccine (1 of 2) Never done    Annual Wellness Visit (AWV)  09/23/2021    Depression Screen  08/12/2022    Potassium monitoring  09/23/2022    Creatinine monitoring  09/23/2022    Flu vaccine  Completed    Pneumococcal 65+ years Vaccine  Completed    COVID-19 Vaccine  Completed    Hepatitis A vaccine  Aged Out    Hepatitis B vaccine  Aged Out    Hib vaccine  Aged Out    Meningococcal (ACWY) vaccine  Aged Out       No results found for: LABA1C          ( goal A1C is < 7)   No results found for: LABMICR  LDL Calculated (mg/dL)   Date Value   11/21/2016 115       (goal LDL is <100)   AST (U/L)   Date Value   07/09/2020 24     ALT (U/L)   Date Value   07/09/2020 19     BUN (mg/dL)   Date Value   09/23/2021 25     BP Readings from Last 3 Encounters:   12/16/21 (!) 148/75   08/12/21 124/62   12/15/20 (!) 105/59          (goal 120/80)    All Future Testing planned in [facility]        Patient Active Problem List:     Osteoarthritis of knees     Benign prostatic hyperplasia with nocturia     Essential hypertension     Mild intermittent asthma without complication     Insomnia     Iliotibial band syndrome affecting left lower leg     Angina pectoris, variant ([address]

## 2022-03-15 ENCOUNTER — TELEPHONE (OUTPATIENT)
Dept: INTERNAL MEDICINE | Age: 82
End: 2022-03-15

## 2022-03-15 NOTE — TELEPHONE ENCOUNTER
PC to patient - informed patient that an appointment is needed to address need for referrals. Patient declined appointment. States he will call back if he decides to proceed forward.

## 2022-03-15 NOTE — TELEPHONE ENCOUNTER
----- Message from [id number] sent at 3/15/2022 11:55 AM EDT -----  Subject: Referral Request    QUESTIONS   Reason for referral request? pt needs to be referred to someone that can   help him with his hand. Rt hand thumb crooked and is having pain pt needs a referral for his knees, he is   thinking there is one in [place], he cannot not bend them my than 90   degrees. Has the physician seen you for this condition before? No   Preferred Specialist (if applicable)? Do you already have an appointment scheduled? No  Additional Information for Provider?   ---------------------------------------------------------------------------  --------------  CALL BACK INFO  What is the best way for the office to contact you? OK to leave message on   voicemail  Preferred Call Back Phone Number?  [phone]

## 2022-03-16 RX ORDER — MONTELUKAST SODIUM 10 MG/1
TABLET ORAL
Qty: 90 TABLET | Refills: 1 | OUTPATIENT
Start: 2022-03-16

## 2022-03-16 NOTE — TELEPHONE ENCOUNTER
PC to patient stated he doesn't want medication at the moment advised me to put it on hold for now and that he doesn't want to schedule an follow up appt

## 2022-03-18 DIAGNOSIS — R35.1 BENIGN PROSTATIC HYPERPLASIA WITH NOCTURIA: ICD-10-CM

## 2022-03-18 DIAGNOSIS — N40.1 BENIGN PROSTATIC HYPERPLASIA WITH NOCTURIA: ICD-10-CM

## 2022-03-21 RX ORDER — FINASTERIDE 5 MG/1
TABLET, FILM COATED ORAL
Qty: 90 TABLET | Refills: 0 | Status: SHIPPED | OUTPATIENT
Start: 2022-03-21 | End: 2022-09-03

## 2022-03-21 RX ORDER — MONTELUKAST SODIUM 10 MG/1
TABLET ORAL
Qty: 90 TABLET | Refills: 1 | Status: SHIPPED | OUTPATIENT
Start: 2022-03-21 | End: 2022-09-26

## 2022-03-21 NOTE — TELEPHONE ENCOUNTER
Request for Multiple meds. Next Visit Date:  No future appointments.     Health Maintenance   Topic Date Due    DTaP/Tdap/Td vaccine (1 - Tdap) Never done    Shingles Vaccine (1 of 2) Never done   [employer] Visit (AWV)  09/23/2021    Depression Screen  08/12/2022    Potassium monitoring  09/23/2022    Creatinine monitoring  09/23/2022    Flu vaccine  Completed    Pneumococcal 65+ years Vaccine  Completed    COVID-19 Vaccine  Completed    Hepatitis A vaccine  Aged Out    Hepatitis B vaccine  Aged Out    Hib vaccine  Aged Out    Meningococcal (ACWY) vaccine  Aged Out       No results found for: LABA1C          ( goal A1C is < 7)   No results found for: LABMICR  LDL Calculated (mg/dL)   Date Value   11/21/2016 115       (goal LDL is <100)   AST (U/L)   Date Value   07/09/2020 24     ALT (U/L)   Date Value   07/09/2020 19     BUN (mg/dL)   Date Value   09/23/2021 25     BP Readings from Last 3 Encounters:   12/16/21 (!) 148/75   08/12/21 124/62   12/15/20 (!) 105/59          (goal 120/80)    All Future Testing planned in [facility]        Patient Active Problem List:     Osteoarthritis of knees     Benign prostatic hyperplasia with nocturia     Essential hypertension     Mild intermittent asthma without complication     Insomnia     Iliotibial band syndrome affecting left lower leg     Angina pectoris, variant ([address]

## 2022-05-18 DIAGNOSIS — I10 ESSENTIAL HYPERTENSION: ICD-10-CM

## 2022-05-19 RX ORDER — ATENOLOL 25 MG/1
TABLET ORAL
Qty: 90 TABLET | Refills: 1 | Status: SHIPPED | OUTPATIENT
Start: 2022-05-19 | End: 2022-06-22 | Stop reason: ALTCHOICE

## 2022-05-19 NOTE — TELEPHONE ENCOUNTER
Request for Atenolol.       Next Visit Date:  Future Appointments   Date Time Provider Lambert Krameri   6/22/2022  1:45 PM Rebekah Villanueva MD [facility] [address] Maintenance   Topic Date Due    DTaP/Tdap/Td vaccine (1 - Tdap) Never done    Shingles vaccine (1 of 2) Never done   [employer] Visit (AWV)  09/23/2021    Depression Screen  08/12/2022    Flu vaccine  Completed    Pneumococcal 65+ years Vaccine  Completed    COVID-19 Vaccine  Completed    Hepatitis A vaccine  Aged Out    Hepatitis B vaccine  Aged Out    Hib vaccine  Aged Out    Meningococcal (ACWY) vaccine  Aged Out       No results found for: LABA1C          ( goal A1C is < 7)   No results found for: LABMICR  LDL Calculated (mg/dL)   Date Value   11/21/2016 115       (goal LDL is <100)   AST (U/L)   Date Value   07/09/2020 24     ALT (U/L)   Date Value   07/09/2020 19     BUN (mg/dL)   Date Value   09/23/2021 25     BP Readings from Last 3 Encounters:   12/16/21 (!) 148/75   08/12/21 124/62   12/15/20 (!) 105/59          (goal 120/80)    All Future Testing planned in [facility]        Patient Active Problem List:     Osteoarthritis of knees     Benign prostatic hyperplasia with nocturia     Essential hypertension     Mild intermittent asthma without complication     Insomnia     Iliotibial band syndrome affecting left lower leg     Angina pectoris, variant ([facility] [address]

## 2022-06-16 DIAGNOSIS — R35.1 BENIGN PROSTATIC HYPERPLASIA WITH NOCTURIA: ICD-10-CM

## 2022-06-16 DIAGNOSIS — N40.1 BENIGN PROSTATIC HYPERPLASIA WITH NOCTURIA: ICD-10-CM

## 2022-06-16 RX ORDER — TAMSULOSIN HYDROCHLORIDE 0.4 MG/1
CAPSULE ORAL
Qty: 90 CAPSULE | Refills: 1 | Status: SHIPPED | OUTPATIENT
Start: 2022-06-16 | End: 2022-09-30 | Stop reason: SDUPTHER

## 2022-06-16 NOTE — TELEPHONE ENCOUNTER
Request for Flomax.       Next Visit Date:  Future Appointments   Date Time Provider Lambert Krameri   6/22/2022  1:45 PM Cara Good MD [facility] [address] Maintenance   Topic Date Due    DTaP/Tdap/Td vaccine (1 - Tdap) Never done    Shingles vaccine (1 of 2) Never done   [employer] Visit (AWV)  09/23/2021    Depression Screen  08/12/2022    Flu vaccine  Completed    Pneumococcal 65+ years Vaccine  Completed    COVID-19 Vaccine  Completed    Hepatitis A vaccine  Aged Out    Hepatitis B vaccine  Aged Out    Hib vaccine  Aged Out    Meningococcal (ACWY) vaccine  Aged Out       No results found for: LABA1C          ( goal A1C is < 7)   No results found for: LABMICR  LDL Calculated (mg/dL)   Date Value   11/21/2016 115       (goal LDL is <100)   AST (U/L)   Date Value   07/09/2020 24     ALT (U/L)   Date Value   07/09/2020 19     BUN (mg/dL)   Date Value   09/23/2021 25     BP Readings from Last 3 Encounters:   12/16/21 (!) 148/75   08/12/21 124/62   12/15/20 (!) 105/59          (goal 120/80)    All Future Testing planned in [facility]        Patient Active Problem List:     Osteoarthritis of knees     Benign prostatic hyperplasia with nocturia     Essential hypertension     Mild intermittent asthma without complication     Insomnia     Iliotibial band syndrome affecting left lower leg     Angina pectoris, variant ([address]

## 2022-06-21 RX ORDER — LOSARTAN POTASSIUM 25 MG/1
TABLET ORAL
COMMUNITY
Start: 2022-06-02 | End: 2022-06-22 | Stop reason: SDUPTHER

## 2022-06-22 ENCOUNTER — OFFICE VISIT (OUTPATIENT)
Dept: INTERNAL MEDICINE | Age: 82
End: 2022-06-22
Payer: MEDICARE

## 2022-06-22 VITALS
DIASTOLIC BLOOD PRESSURE: 75 MMHG | BODY MASS INDEX: 27.82 KG/M2 | TEMPERATURE: 98.2 F | HEART RATE: 55 BPM | SYSTOLIC BLOOD PRESSURE: 132 MMHG | WEIGHT: 167 LBS | OXYGEN SATURATION: 95 % | HEIGHT: 65 IN

## 2022-06-22 DIAGNOSIS — M76.32 ILIOTIBIAL BAND SYNDROME AFFECTING LEFT LOWER LEG: ICD-10-CM

## 2022-06-22 DIAGNOSIS — R35.1 BENIGN PROSTATIC HYPERPLASIA WITH NOCTURIA: Primary | ICD-10-CM

## 2022-06-22 DIAGNOSIS — I10 ESSENTIAL HYPERTENSION: ICD-10-CM

## 2022-06-22 DIAGNOSIS — G47.09 OTHER INSOMNIA: ICD-10-CM

## 2022-06-22 DIAGNOSIS — M17.10 ARTHRITIS OF KNEE: ICD-10-CM

## 2022-06-22 DIAGNOSIS — M18.11 OSTEOARTHRITIS OF CARPOMETACARPAL (CMC) JOINT OF RIGHT THUMB, UNSPECIFIED OSTEOARTHRITIS TYPE: ICD-10-CM

## 2022-06-22 DIAGNOSIS — N40.1 BENIGN PROSTATIC HYPERPLASIA WITH NOCTURIA: Primary | ICD-10-CM

## 2022-06-22 PROCEDURE — 99213 OFFICE O/P EST LOW 20 MIN: CPT

## 2022-06-22 PROCEDURE — 1036F TOBACCO NON-USER: CPT

## 2022-06-22 PROCEDURE — G8427 DOCREV CUR MEDS BY ELIG CLIN: HCPCS

## 2022-06-22 PROCEDURE — 1123F ACP DISCUSS/DSCN MKR DOCD: CPT

## 2022-06-22 PROCEDURE — G8417 CALC BMI ABV UP PARAM F/U: HCPCS

## 2022-06-22 RX ORDER — CALCIUM/MAGNESIUM/ZINC 333-133 MG
TABLET ORAL
COMMUNITY

## 2022-06-22 RX ORDER — LOSARTAN POTASSIUM 25 MG/1
25 TABLET ORAL DAILY
Qty: 30 TABLET | Refills: 3 | Status: SHIPPED | OUTPATIENT
Start: 2022-06-22

## 2022-06-22 ASSESSMENT — ENCOUNTER SYMPTOMS
CONSTIPATION: 0
WHEEZING: 0
COUGH: 0
SINUS PAIN: 0
COLOR CHANGE: 0
CHOKING: 0
APNEA: 0
SHORTNESS OF BREATH: 0
DIARRHEA: 0
SORE THROAT: 0
BLOOD IN STOOL: 0
SINUS PRESSURE: 0
ABDOMINAL PAIN: 0
ABDOMINAL DISTENTION: 0

## 2022-06-22 ASSESSMENT — PATIENT HEALTH QUESTIONNAIRE - PHQ9
SUM OF ALL RESPONSES TO PHQ QUESTIONS 1-9: 0
SUM OF ALL RESPONSES TO PHQ QUESTIONS 1-9: 0
2. FEELING DOWN, DEPRESSED OR HOPELESS: 0
SUM OF ALL RESPONSES TO PHQ9 QUESTIONS 1 & 2: 0
SUM OF ALL RESPONSES TO PHQ QUESTIONS 1-9: 0
1. LITTLE INTEREST OR PLEASURE IN DOING THINGS: 0
SUM OF ALL RESPONSES TO PHQ QUESTIONS 1-9: 0

## 2022-06-22 NOTE — PATIENT INSTRUCTIONS
Patient was put on a wait list and will be contacted to schedule their next follow up appointment once the schedule is available. If the patient is in need of an appointment before their next visit please call the office at [phone]. After Visit Summary  given and reviewed.

## 2022-06-22 NOTE — PROGRESS NOTES
[facility] [address] [address]  Dept: [phone]  Dept Fax: [phone]    Office Progress/Follow Up Note  Date ofpatient's visit: 6/22/2022  Patient's Name:  Brianna Pavon YOB: 1940            Patient Care Team:  Mathew Gomez MD as PCP - General (Internal Medicine)  Bassem Chowdhury MD as PCP - REHABILITATION [facility] Empaneled Provider  ================================================================    REASON FOR VISIT/CHIEF COMPLAINT:  Hypertension, Pain (left knee and right hand pain), Spots and/or Floaters (pt has dark spots on face and chest), and Health Maintenance (declines shingles, unsure about tdap )    HISTORY OF PRESENTING ILLNESS:  History was obtained from: patient, electronic medical record. Sita don 80 y.o. is here for a follow-up visit. Complaining of chronic bilateral knee pain and pain at base of the right thumb. He is a active person, working out at [employer]. No swelling, color changes, trauma, fever or chills. Continue with pain control with Tylenol. Discussed in detail with the patient regarding history of osteoarthritis, knee replacement and its progression. Patient verbalized understanding. Agreed to the plan. He has history of hypertension, was not receiving his atenolol. Went to [facility] for second opinion. Echo is done which shows EF of 60%, LVH. Started on losartan 25. Blood pressure 132/75 today. Will refill Cozaar 25 daily for him. He is not checking his blood pressure regularly at home though he is blood pressure monitoring kit. Counseled regarding checking blood pressure at home.     He is complaining of increased urinary frequency at night which is disturbing his sleep more often. He is taking finasteride and Flomax for BPH but it is not helping much. We will refer him to urology for further recommendations. Got COVID booster on Monday 12/13/21. Got his flu vaccine today. Denied Tdap.     Also has history of asthma and he takes montelukast 10 mg nightly and uses Advair inhaler twice daily. Asthma is well controlled. During summer he has runny nose. Using Echinacea 480 for that.     Patient was recommended for outpatient sleep study during his last office visit. Diagnosis Orders   1. Benign prostatic hyperplasia with nocturia  [facility], [facility]   2. Essential hypertension  Basic Metabolic Panel   3. Osteoarthritis of knees     4. Iliotibial band syndrome affecting left lower leg     5. Other insomnia     6.  Osteoarthritis of carpometacarpal (CMC) joint of right thumb, unspecified osteoarthritis type        Patient Active Problem List   Diagnosis    Osteoarthritis of knees    Benign prostatic hyperplasia with nocturia    Essential hypertension    Mild intermittent asthma without complication    Insomnia    Iliotibial band syndrome affecting left lower leg    Angina pectoris, variant (HCC)       Health Maintenance Due   Topic Date Due    DTaP/Tdap/Td vaccine (1 - Tdap) Never done    Shingles vaccine (1 of 2) Never done   [employer] Visit (AWV)  09/23/2021       Allergies   Allergen Reactions    Aspirin     Ibuprofen          Current Outpatient Medications   Medication Sig Dispense Refill    Echinacea 400 MG CAPS Take by mouth      losartan (COZAAR) 25 MG tablet Take 1 tablet by mouth daily 30 tablet 3    tamsulosin (FLOMAX) 0.4 MG capsule TAKE ONE CAPSULE BY MOUTH DAILY 90 capsule 1    montelukast (SINGULAIR) 10 MG tablet TAKE ONE TABLET BY MOUTH ONCE NIGHTLY 90 tablet 1    finasteride (PROSCAR) 5 MG tablet TAKE ONE TABLET BY MOUTH DAILY (Patient taking differently: as needed TAKE ONE TABLET BY MOUTH DAILY) 90 tablet 0    Blood Pressure KIT 1 each by Does not apply route daily 1 kit 0    Handicap Placard MISC by Does not apply route Expires 5 years  Dx Osteoarthritis knee 1 each 0    Handicap Placard MISC by Does not apply route Expires : 5 years  Dx  vitamin D (ERGOCALCIFEROL) 400 UNITS CAPS Take 400 Units by mouth daily Takes 1/2 tablet daily      calcium & magnesium carbonates (MYLANTA) per tablet Take  by mouth daily. Half tab       SAW PALMETTO by Does not apply route.  vitamin D (CHOLECALCIFEROL) 25 MCG (1000 UT) TABS tablet Take 1,000 Units by mouth daily 1/2 tablet  (Patient not taking: Reported on 6/22/2022)       No current facility-administered medications for this visit. Social History     Tobacco Use    Smoking status: Never Smoker    Smokeless tobacco: Never Used   Substance Use Topics    Alcohol use: Yes     Alcohol/week: 4.0 standard drinks     Types: 4 Shots of liquor per week    Drug use: Yes     Comment: CBD        Family History   Problem Relation Age of Onset    Cancer Mother 80        colon    Heart Disease Mother     Heart Disease Father     Other Father 70        ALS        REVIEW OF SYSTEMS:  Review of Systems   Constitutional: Negative for activity change, appetite change, diaphoresis and fatigue. HENT: Negative for congestion, hearing loss, postnasal drip, sinus pressure, sinus pain, sneezing and sore throat. Respiratory: Negative for apnea, cough, choking, shortness of breath and wheezing. Cardiovascular: Negative for chest pain, palpitations and leg swelling. Gastrointestinal: Negative for abdominal distention, abdominal pain, blood in stool, constipation and diarrhea. Genitourinary: Positive for frequency. Negative for decreased urine volume, difficulty urinating, dysuria, enuresis, flank pain and urgency. Musculoskeletal: Positive for arthralgias. Negative for joint swelling, myalgias and neck pain. Skin: Negative for color change, pallor and rash. Allergic/Immunologic: Negative for environmental allergies. Neurological: Negative for dizziness, tremors, seizures, syncope, facial asymmetry, numbness and headaches.    Psychiatric/Behavioral: Negative for agitation, confusion, dysphoric mood and hallucinations. The patient is not nervous/anxious and is not hyperactive. PHYSICAL EXAM:  Vitals:    06/22/22 1341   BP: 132/75   Site: Left Upper Arm   Position: Sitting   Cuff Size: Medium Adult   Pulse: 55   Temp: 98.2 °F (36.8 °C)   SpO2: 95%   Weight: 167 lb (75.8 kg)   Height: 5' 5\" (1.651 m)     BP Readings from Last 3 Encounters:   06/22/22 132/75   12/16/21 (!) 148/75   08/12/21 124/62        Physical Exam  Constitutional:       General: He is not in acute distress. Appearance: He is not ill-appearing or toxic-appearing. Cardiovascular:      Heart sounds: No murmur heard. No friction rub. No gallop. Pulmonary:      Effort: No respiratory distress. Breath sounds: No stridor. No wheezing, rhonchi or rales. Abdominal:      General: There is no distension. Palpations: There is no mass. Tenderness: There is no abdominal tenderness. There is no rebound. Hernia: No hernia is present. Musculoskeletal:         General: No swelling, tenderness, deformity or signs of injury. Cervical back: No rigidity. Right lower leg: Edema present. Left lower leg: Edema present. Lymphadenopathy:      Cervical: No cervical adenopathy. Skin:     Coloration: Skin is not jaundiced or pale. Findings: No bruising, erythema, lesion or rash. Neurological:      Cranial Nerves: No cranial nerve deficit. Sensory: No sensory deficit. Motor: No weakness.       Coordination: Coordination normal.      Gait: Gait normal.           DIAGNOSTIC FINDINGS:  CBC:  Lab Results   Component Value Date    WBC 6.6 09/23/2021    HGB 15.7 09/23/2021     09/23/2021       BMP:    Lab Results   Component Value Date     09/23/2021    K 4.4 09/23/2021     09/23/2021    CO2 29 09/23/2021    BUN 25 09/23/2021    CREATININE 1.10 09/23/2021    GLUCOSE 95 09/23/2021    GLUCOSE 86 04/15/2019       HEMOGLOBIN A1C: No results found for: LABA1C    FASTING LIPID

## 2022-09-02 DIAGNOSIS — N40.1 BENIGN PROSTATIC HYPERPLASIA WITH NOCTURIA: ICD-10-CM

## 2022-09-02 DIAGNOSIS — R35.1 BENIGN PROSTATIC HYPERPLASIA WITH NOCTURIA: ICD-10-CM

## 2022-09-02 NOTE — TELEPHONE ENCOUNTER
E-scribe request for medication finasteride . Pt is on wait list till Dec.     Health Maintenance   Topic Date Due    DTaP/Tdap/Td vaccine (1 - Tdap) Never done    Shingles vaccine (1 of 2) Never done    Annual Wellness Visit (AWV)  09/23/2021    COVID-19 Vaccine (4 - Booster for Moderna series) 04/13/2022    Flu vaccine (1) 09/01/2022    Depression Screen  06/22/2023    Pneumococcal 65+ years Vaccine  Completed    Hepatitis A vaccine  Aged Out    Hepatitis B vaccine  Aged Out    Hib vaccine  Aged Out    Meningococcal (ACWY) vaccine  Aged Out             (applicable per patient's age: Cancer Screenings, Depression Screening, Fall Risk Screening, Immunizations)    LDL Calculated (mg/dL)   Date Value   11/21/2016 115     AST (U/L)   Date Value   07/09/2020 24     ALT (U/L)   Date Value   07/09/2020 19     BUN (mg/dL)   Date Value   09/23/2021 25      (goal A1C is < 7)   (goal LDL is <100) need 30-50% reduction from baseline     BP Readings from Last 3 Encounters:   06/22/22 132/75   12/16/21 (!) 148/75   08/12/21 124/62    (goal /80)      All Future Testing planned in [facility]:  Lab Frequency Next Occurrence   Basic Metabolic Panel Once 45/73/7756       Next Visit Date:  No future appointments.          Patient Active Problem List:     Osteoarthritis of knees     Benign prostatic hyperplasia with nocturia     Essential hypertension     Mild intermittent asthma without complication     Insomnia     Iliotibial band syndrome affecting left lower leg     Angina pectoris, variant ([address]

## 2022-09-03 RX ORDER — FINASTERIDE 5 MG/1
TABLET, FILM COATED ORAL
Qty: 90 TABLET | Refills: 0 | Status: SHIPPED | OUTPATIENT
Start: 2022-09-03

## 2022-09-14 NOTE — PATIENT INSTRUCTIONS
Mailed to pt   available. If the patient is in need of an appointment before their next visit please call the office at [phone]. After Visit Summary  mailed to patient.     SL

## 2022-09-30 DIAGNOSIS — R35.1 BENIGN PROSTATIC HYPERPLASIA WITH NOCTURIA: ICD-10-CM

## 2022-09-30 DIAGNOSIS — N40.1 BENIGN PROSTATIC HYPERPLASIA WITH NOCTURIA: ICD-10-CM

## 2022-09-30 RX ORDER — TAMSULOSIN HYDROCHLORIDE 0.4 MG/1
CAPSULE ORAL
Qty: 90 CAPSULE | Refills: 1 | Status: SHIPPED | OUTPATIENT
Start: 2022-09-30

## 2022-09-30 NOTE — TELEPHONE ENCOUNTER
Refill request for Flomax. If appropriate please send medication(s) to patients pharmacy.     Next appt: 12/14/2022      Health Maintenance   Topic Date Due    DTaP/Tdap/Td vaccine (1 - Tdap) Never done    Shingles vaccine (1 of 2) Never done    Annual Wellness Visit (AWV)  09/23/2021    COVID-19 Vaccine (4 - Booster for Moderna series) 04/13/2022    Flu vaccine (1) 08/01/2022    Depression Screen  06/22/2023    Pneumococcal 65+ years Vaccine  Completed    Hepatitis A vaccine  Aged Out    Hepatitis B vaccine  Aged Out    Hib vaccine  Aged Out    Meningococcal (ACWY) vaccine  Aged Out       No results found for: LABA1C          ( goal A1C is < 7)   No results found for: LABMICR  LDL Calculated (mg/dL)   Date Value   11/21/2016 115       (goal LDL is <100)   AST (U/L)   Date Value   07/09/2020 24     ALT (U/L)   Date Value   07/09/2020 19     BUN (mg/dL)   Date Value   09/23/2021 25     BP Readings from Last 3 Encounters:   06/22/22 132/75   12/16/21 (!) 148/75   08/12/21 124/62          (goal 120/80)          Patient Active Problem List:     Osteoarthritis of knees     Benign prostatic hyperplasia with nocturia     Essential hypertension     Mild intermittent asthma without complication     Insomnia     Iliotibial band syndrome affecting left lower leg     Angina pectoris, variant ([address]

## 2022-12-09 DIAGNOSIS — R35.1 BENIGN PROSTATIC HYPERPLASIA WITH NOCTURIA: ICD-10-CM

## 2022-12-09 DIAGNOSIS — N40.1 BENIGN PROSTATIC HYPERPLASIA WITH NOCTURIA: ICD-10-CM

## 2022-12-09 NOTE — TELEPHONE ENCOUNTER
Request for finasteride. Please review and e-scribe to pharmacy listed in chart if appropriate. Thank you.       Next Visit Date: 12/14/2022      Future Appointments   Date Time Provider Lambert Krameri   12/14/2022  1:00 PM Mariaa Hernandez MD [address] [facility] Maintenance   Topic Date Due    DTaP/Tdap/Td vaccine (1 - Tdap) Never done    Shingles vaccine (1 of 2) Never done    Annual Wellness Visit (AWV)  09/23/2021    Flu vaccine (1) 08/01/2022    Depression Screen  06/22/2023    Pneumococcal 65+ years Vaccine  Completed    COVID-19 Vaccine  Completed    Hepatitis A vaccine  Aged Out    Hib vaccine  Aged Out    Meningococcal (ACWY) vaccine  Aged Out       No results found for: LABA1C          ( goal A1C is < 7)   No results found for: LABMICR  LDL Calculated (mg/dL)   Date Value   11/21/2016 115       (goal LDL is <100)   AST (U/L)   Date Value   07/09/2020 24     ALT (U/L)   Date Value   07/09/2020 19     BUN (mg/dL)   Date Value   09/23/2021 25     BP Readings from Last 3 Encounters:   06/22/22 132/75   12/16/21 (!) 148/75   08/12/21 124/62          (goal 120/80)    All Future Testing planned in [facility]  Lab Frequency Next Occurrence   Basic Metabolic Panel Once 68/00/8417         Patient Active Problem List:     Osteoarthritis of knees     Benign prostatic hyperplasia with nocturia     Essential hypertension     Mild intermittent asthma without complication     Insomnia     Iliotibial band syndrome affecting left lower leg     Angina pectoris, variant ([address]

## 2022-12-11 RX ORDER — FINASTERIDE 5 MG/1
5 TABLET, FILM COATED ORAL DAILY
Qty: 90 TABLET | Refills: 1 | Status: SHIPPED | OUTPATIENT
Start: 2022-12-11

## 2022-12-20 ENCOUNTER — TELEPHONE (OUTPATIENT)
Dept: INTERNAL MEDICINE | Age: 82
End: 2022-12-20

## 2022-12-21 DIAGNOSIS — U07.1 COVID-19: Primary | ICD-10-CM

## 2022-12-21 RX ORDER — NIRMATRELVIR AND RITONAVIR 300-100 MG
KIT ORAL
Qty: 30 TABLET | Refills: 0 | Status: SHIPPED | OUTPATIENT
Start: 2022-12-21 | End: 2022-12-26

## 2023-01-04 ENCOUNTER — OFFICE VISIT (OUTPATIENT)
Dept: INTERNAL MEDICINE | Age: 83
End: 2023-01-04

## 2023-01-04 VITALS
HEIGHT: 64 IN | SYSTOLIC BLOOD PRESSURE: 131 MMHG | OXYGEN SATURATION: 97 % | DIASTOLIC BLOOD PRESSURE: 70 MMHG | HEART RATE: 60 BPM | WEIGHT: 165.8 LBS | TEMPERATURE: 97.9 F | BODY MASS INDEX: 28.31 KG/M2

## 2023-01-04 DIAGNOSIS — I10 ESSENTIAL HYPERTENSION: Primary | ICD-10-CM

## 2023-01-04 DIAGNOSIS — J45.20 MILD INTERMITTENT ASTHMA WITHOUT COMPLICATION: ICD-10-CM

## 2023-01-04 DIAGNOSIS — R35.1 BENIGN PROSTATIC HYPERPLASIA WITH NOCTURIA: ICD-10-CM

## 2023-01-04 DIAGNOSIS — U07.1 COVID-19: ICD-10-CM

## 2023-01-04 DIAGNOSIS — N40.1 BENIGN PROSTATIC HYPERPLASIA WITH NOCTURIA: ICD-10-CM

## 2023-01-04 DIAGNOSIS — M17.10 ARTHRITIS OF KNEE: ICD-10-CM

## 2023-01-04 DIAGNOSIS — Z12.11 SCREENING FOR COLON CANCER: ICD-10-CM

## 2023-01-04 PROBLEM — R68.89 FLU-LIKE SYMPTOMS: Status: ACTIVE | Noted: 2023-01-04

## 2023-01-04 PROBLEM — R68.89 FLU-LIKE SYMPTOMS: Status: RESOLVED | Noted: 2023-01-04 | Resolved: 2023-01-04

## 2023-01-04 RX ORDER — LOSARTAN POTASSIUM 50 MG/1
TABLET ORAL
COMMUNITY
Start: 2022-10-04

## 2023-01-04 SDOH — ECONOMIC STABILITY: FOOD INSECURITY: WITHIN THE PAST 12 MONTHS, YOU WORRIED THAT YOUR FOOD WOULD RUN OUT BEFORE YOU GOT MONEY TO BUY MORE.: NEVER TRUE

## 2023-01-04 SDOH — ECONOMIC STABILITY: FOOD INSECURITY: WITHIN THE PAST 12 MONTHS, THE FOOD YOU BOUGHT JUST DIDN'T LAST AND YOU DIDN'T HAVE MONEY TO GET MORE.: NEVER TRUE

## 2023-01-04 ASSESSMENT — PATIENT HEALTH QUESTIONNAIRE - PHQ9
2. FEELING DOWN, DEPRESSED OR HOPELESS: 1
SUM OF ALL RESPONSES TO PHQ QUESTIONS 1-9: 1
1. LITTLE INTEREST OR PLEASURE IN DOING THINGS: 0
SUM OF ALL RESPONSES TO PHQ QUESTIONS 1-9: 1
SUM OF ALL RESPONSES TO PHQ9 QUESTIONS 1 & 2: 1

## 2023-01-04 ASSESSMENT — SOCIAL DETERMINANTS OF HEALTH (SDOH): HOW HARD IS IT FOR YOU TO PAY FOR THE VERY BASICS LIKE FOOD, HOUSING, MEDICAL CARE, AND HEATING?: NOT HARD AT ALL

## 2023-01-04 NOTE — PATIENT INSTRUCTIONS
F/u 6 mon    Please call [phone] select option #4 to have a new cologuard kit mailed to you. -Bloodwork orders given to patient, they will have them done before their next visit.

## 2023-01-04 NOTE — PROGRESS NOTES
[facility] [id number] [facility] [address]  Dept: [phone]  Dept Fax: [phone]    Office Progress/Follow Up Note  Date ofpatient's visit: 1/4/2023  Patient's Name:  Kenny Riojas YOB: 1940            Patient Care Team:  Braulio Riojas MD as PCP - General (Internal Medicine)  Dick Augustine MD as PCP - [facility]  ================================================================    REASON FOR VISIT/CHIEF COMPLAINT:  Follow up of medical conditions    HISTORY OF PRESENTING ILLNESS:  History was obtained from: patient, electronic medical record. Antonio Galeas a 80 y.o. is here for a Follow up for HTN, BPH, osteoarthritis of knees, asthma and recent covid infection. Reported flu like symptoms with runny nose, cough, sore throat and headache 2 weeks ago. Home covid test was positive. Was given paxlovid for 5 days. Symptoms have mostly resolved except mild cough with nasal discharge. Patient has history of asthma. Denied wheezing, dyspnea, chest tightness, or paroxysmal nocturnal dyspnea. No increased requirement for inhalers reported. Patient has history of hypertension. Checks his blood pressure regular at home. Well controlled. Denies headaches, visual changes, chest pain or tightness. Patient has history of BPH. Wakes up 2-3 times each night to urinate. No burning micturition, urgency or hematuria reported. Does not follow with urology. Referral was given during last office visit. Patient is complaining of chronic bilateral knee pain. Symptoms well controlled with tylenol. No new swelling, redness, tenderness reported. Diagnosis Orders   1. Essential hypertension  Basic Metabolic Panel      2. Benign prostatic hyperplasia with nocturia        3. Mild intermittent asthma without complication        4. Osteoarthritis of knees        5. COVID-19        6.  Screening for colon cancer  Fecal DNA Colorectal cancer screening (Cologuard)         Patient Active Problem List   Diagnosis    Osteoarthritis of knees    Benign prostatic hyperplasia with nocturia    Essential hypertension    Mild intermittent asthma without complication    Insomnia    Iliotibial band syndrome affecting left lower leg    Angina pectoris, variant ([facility]       Health Maintenance Due   Topic Date Due    DTaP/Tdap/Td vaccine (1 - Tdap) Never done    Shingles vaccine (1 of 2) Never done    Annual Wellness Visit (AWV)  09/23/2021    Flu vaccine (1) 08/01/2022       Allergies   Allergen Reactions    Aspirin     Ibuprofen          Current Outpatient Medications   Medication Sig Dispense Refill    finasteride (PROSCAR) 5 MG tablet Take 1 tablet by mouth daily 90 tablet 1    tamsulosin (FLOMAX) 0.4 MG capsule TAKE ONE CAPSULE BY MOUTH DAILY 90 capsule 1    montelukast (SINGULAIR) 10 MG tablet TAKE ONE TABLET BY MOUTH ONCE NIGHTLY 90 tablet 0    Echinacea 400 MG CAPS Take by mouth      Misc Natural Products (PROSTATE SUPPORT PO) Take 1 tablet by mouth daily      GNP NORWEGIAN COD LIVER OIL PO Take 1 tablet by mouth daily      Green Tea 315 MG CAPS Take 315 mg by mouth daily      Flaxseed, Linseed, (FLAXSEED OIL) 1200 MG CAPS Take 1,200 mg by mouth daily      APPLE CIDER VINEGAR PO Take 1,200 mg by mouth daily      vitamin D (CHOLECALCIFEROL) 25 MCG (1000 UT) TABS tablet Take 1,000 Units by mouth daily 1/2 tablet      melatonin 3 MG TABS tablet Take 3 mg by mouth nightly as needed (1/2 tablet)      VITAMIN E NATURAL PO Take 180 mg by mouth daily      coenzyme Q10 100 MG CAPS capsule Take 100 mg by mouth daily      TURMERIC PO Take 1 tablet by mouth daily       vitamin C (ASCORBIC ACID) 500 MG tablet Take 250 mg by mouth daily      Omega-3 Fatty Acids (FISH OIL) 1360 MG CAPS Take 1,360 mg by mouth daily      fluticasone-salmeterol (ADVAIR DISKUS) 250-50 MCG/DOSE AEPB Inhale 1 puff into the lungs 2 times daily (Patient taking differently: Inhale 1 puff into the lungs as needed) 3 each 1    albuterol sulfate HFA (PROAIR HFA) 108 (90 Base) MCG/ACT inhaler 2 puffs inhaled every 4-6 hours as needed for cough, wheeze, chest tightness or shortness of breath (Patient taking differently: as needed 2 puffs inhaled every 4-6 hours as needed for cough, wheeze, chest tightness or shortness of breath) 1 Inhaler 6    fluticasone (VERAMYST) 27.5 MCG/SPRAY nasal spray 2 sprays to each nostril once daily (Patient taking differently: as needed 2 sprays to each nostril once daily) 1 Bottle 11    acetaminophen (TYLENOL) 500 MG tablet Take 1 tablet by mouth every 8 hours as needed for Pain (Patient taking differently: Take 500 mg by mouth as needed for Pain) 90 tablet 5    Minoxidil (ROGAINE MENS EX) Apply  topically. vitamin B-12 (CYANOCOBALAMIN) 1000 MCG tablet Take 1,000 mcg by mouth as needed Takes 1/2 tablet daily      Multiple Vitamins-Minerals (COMPLETE SENIOR PO) Take by mouth Takes 1/2 tablet daily      Selenium 200 MCG TABS Take  by mouth. Half tab daily       vitamin D (ERGOCALCIFEROL) 400 UNITS CAPS Take 400 Units by mouth daily Takes 1/2 tablet daily      calcium & magnesium carbonates (MYLANTA) per tablet Take  by mouth daily. Half tab       losartan (COZAAR) 50 MG tablet        No current facility-administered medications for this visit. Social History     Tobacco Use    Smoking status: Never    Smokeless tobacco: Never   Substance Use Topics    Alcohol use:  Yes     Alcohol/week: 4.0 standard drinks     Types: 4 Shots of liquor per week    Drug use: Yes     Comment: CBD        Family History   Problem Relation Age of Onset    Cancer Mother 80        colon    Heart Disease Mother     Heart Disease Father     Other Father 70        ALS        REVIEW OF SYSTEMS:    Denies headache, blurry vision, fever, chills, nausea, vomiting  Denies chest pain, chest tightness, palpitations  Denies abdominal pain, diarrhea, constipation  Denies burning micturition, hematuria    PHYSICAL EXAM:  Vitals:    01/04/23 1316   BP: 131/70   Site: Right Upper Arm   Position: Sitting   Cuff Size: Medium Adult   Pulse: 60   Temp: 97.9 °F (36.6 °C)   SpO2: 97%   Weight: 165 lb 12.8 oz (75.2 kg)   Height: 5' 4\" (1.626 m)     BP Readings from Last 3 Encounters:   01/04/23 131/70   06/22/22 132/75   12/16/21 (!) 148/75        Physical Exam  Constitutional:       General: He is not in acute distress. Appearance: He is not ill-appearing or diaphoretic. Cardiovascular:      Rate and Rhythm: Normal rate and regular rhythm. Heart sounds: No murmur heard. No gallop. Pulmonary:      Effort: No respiratory distress. Breath sounds: Normal breath sounds. No wheezing or rales. Abdominal:      General: There is no distension. Tenderness: There is no abdominal tenderness. There is no guarding. Musculoskeletal:         General: No swelling, deformity or signs of injury. Right lower leg: No edema. Left lower leg: No edema. Neurological:      General: No focal deficit present. Mental Status: He is oriented to person, place, and time. Cranial Nerves: No cranial nerve deficit. Motor: No weakness. Psychiatric:         Mood and Affect: Mood normal.         Behavior: Behavior normal.         Thought Content:  Thought content normal.         DIAGNOSTIC FINDINGS:  CBC:  Lab Results   Component Value Date/Time    WBC 6.6 09/23/2021 12:00 AM    HGB 15.7 09/23/2021 12:00 AM     09/23/2021 12:00 AM       BMP:    Lab Results   Component Value Date/Time     09/23/2021 12:00 AM    K 4.4 09/23/2021 12:00 AM     09/23/2021 12:00 AM    CO2 29 09/23/2021 12:00 AM    BUN 25 09/23/2021 12:00 AM    CREATININE 1.10 09/23/2021 12:00 AM    GLUCOSE 95 09/23/2021 12:00 AM    GLUCOSE 86 04/15/2019 02:24 AM       HEMOGLOBIN A1C: No results found for: LABA1C    FASTING LIPID PANEL:  Lab Results   Component Value Date    CHOL 188 11/21/2016    HDL 54 11/21/2016    TRIG 95 11/21/2016 ASSESSMENT AND PLAN:  Kenroy Dixon was seen today for hypertension. Diagnoses and all orders for this visit:    COVID-19 infection:  -Symptoms started 2 weeks ago  -Home COVID test positive  -Treated with Paxlovid for 5 days  -Most of the symptoms have resolved except rhinorrhea and intermittent cough  -Continue on albuterol and Advair discus    Essential hypertension  -Well-controlled  -Regularly checks his blood pressure at home, readings 1 20-1 30/70-85  -Continue on Cozaar 50 daily  -Basic Metabolic Panel; Future    Benign prostatic hyperplasia with nocturia  -Reported nocturia, frequency 2-3 times each night  -Denies burning micturition, urgency, hematuria  -Follow-up with urology    Mild intermittent asthma without complication  -Symptoms well controlled  -Continue on albuterol inhaler 2 puffs every 6 as needed  -Continue on Advair Diskus 1 puff into the lungs 2 times daily    Osteoarthritis of knees  -Continue on Tylenol as needed    Screening for colon cancer  -Fecal DNA Colorectal cancer screening (Cologuard)       FOLLOW UP AND INSTRUCTIONS:  Return in about 6 months (around 7/4/2023). Kenroy Dixon received counseling on the following healthy behaviors: exercise and medication adherence    Discussed use, benefit, and side effects of prescribed medications. Barriers to medication compliance addressed. All patient questions answered. Pt voiced understanding. Patient given educational materials - see patient instructions    Marylee Foley, MD  Resident Internal Medicine  [address]  1/4/2023, 8:11 PM    This note is created with the assistance of a speech-recognition program. While intending to generate a document that actually reflects the content of thevisit, the document can still have some mistakes which may not have been identified and corrected by editing.

## 2023-01-06 RX ORDER — ATENOLOL 25 MG/1
TABLET ORAL
Qty: 90 TABLET | OUTPATIENT
Start: 2023-01-06

## 2023-01-06 NOTE — TELEPHONE ENCOUNTER
Request for atenolol.   Next diane on 5/10/23    Next Visit Date:  Future Appointments   Date Time Provider Lambert Thomas   5/10/2023  1:30 PM Yunier Sheffield MD [facility]   Topic Date Due    DTaP/Tdap/Td vaccine (1 - Tdap) Never done    Shingles vaccine (1 of 2) Never done    Annual Wellness Visit (AWV)  09/23/2021    Flu vaccine (1) 08/01/2022    Depression Screen  01/04/2024    Pneumococcal 65+ years Vaccine  Completed    COVID-19 Vaccine  Completed    Hepatitis A vaccine  Aged Out    Hib vaccine  Aged Out    Meningococcal (ACWY) vaccine  Aged Out       No results found for: LABA1C          ( goal A1C is < 7)   No results found for: LABMICR  LDL Calculated (mg/dL)   Date Value   11/21/2016 115       (goal LDL is <100)   AST (U/L)   Date Value   07/09/2020 24     ALT (U/L)   Date Value   07/09/2020 19     BUN (mg/dL)   Date Value   09/23/2021 25     BP Readings from Last 3 Encounters:   01/04/23 131/70   06/22/22 132/75   12/16/21 (!) 148/75          (goal 120/80)    All Future Testing planned in [facility]  Lab Frequency Next Occurrence   Basic Metabolic Panel Once 60/81/5183         Patient Active Problem List:     Osteoarthritis of knees     Benign prostatic hyperplasia with nocturia     Essential hypertension     Mild intermittent asthma without complication     Insomnia     Iliotibial band syndrome affecting left lower leg     Angina pectoris, variant ([address]

## 2023-01-10 DIAGNOSIS — J45.20 MILD INTERMITTENT ASTHMA WITHOUT COMPLICATION: ICD-10-CM

## 2023-01-10 DIAGNOSIS — R35.1 BENIGN PROSTATIC HYPERPLASIA WITH NOCTURIA: ICD-10-CM

## 2023-01-10 DIAGNOSIS — N40.1 BENIGN PROSTATIC HYPERPLASIA WITH NOCTURIA: ICD-10-CM

## 2023-01-10 RX ORDER — TAMSULOSIN HYDROCHLORIDE 0.4 MG/1
CAPSULE ORAL
Qty: 90 CAPSULE | Refills: 2 | Status: SHIPPED | OUTPATIENT
Start: 2023-01-10

## 2023-01-10 RX ORDER — MONTELUKAST SODIUM 10 MG/1
TABLET ORAL
Qty: 90 TABLET | Refills: 2 | Status: SHIPPED | OUTPATIENT
Start: 2023-01-10

## 2023-01-10 NOTE — TELEPHONE ENCOUNTER
Request for Montelukast.    Next Visit Date:  Future Appointments   Date Time Provider Lambert Martha   5/10/2023  1:30 PM Colton Licona MD [address] [facility]   Topic Date Due    DTaP/Tdap/Td vaccine (1 - Tdap) Never done    Shingles vaccine (1 of 2) Never done    Annual Wellness Visit (AWV)  09/23/2021    Flu vaccine (1) 08/01/2022    Depression Screen  01/04/2024    Pneumococcal 65+ years Vaccine  Completed    COVID-19 Vaccine  Completed    Hepatitis A vaccine  Aged Out    Hib vaccine  Aged Out    Meningococcal (ACWY) vaccine  Aged Out       No results found for: LABA1C          ( goal A1C is < 7)   No results found for: LABMICR  LDL Calculated (mg/dL)   Date Value   11/21/2016 115       (goal LDL is <100)   AST (U/L)   Date Value   07/09/2020 24     ALT (U/L)   Date Value   07/09/2020 19     BUN (mg/dL)   Date Value   09/23/2021 25     BP Readings from Last 3 Encounters:   01/04/23 131/70   06/22/22 132/75   12/16/21 (!) 148/75          (goal 120/80)    All Future Testing planned in [facility]  Lab Frequency Next Occurrence   Basic Metabolic Panel Once 86/07/2888         Patient Active Problem List:     Osteoarthritis of knees     Benign prostatic hyperplasia with nocturia     Essential hypertension     Mild intermittent asthma without complication     Insomnia     Iliotibial band syndrome affecting left lower leg     Angina pectoris, variant ([address]

## 2023-01-11 RX ORDER — ATENOLOL 25 MG/1
TABLET ORAL
Qty: 90 TABLET | OUTPATIENT
Start: 2023-01-11

## 2023-01-22 ENCOUNTER — TELEPHONE (OUTPATIENT)
Dept: INTERNAL MEDICINE | Age: 83
End: 2023-01-22

## 2023-03-06 ASSESSMENT — ENCOUNTER SYMPTOMS
BLOOD IN STOOL: 0
SHORTNESS OF BREATH: 0
VOMITING: 0
EYE PAIN: 0
WHEEZING: 0
PHOTOPHOBIA: 0
EYE REDNESS: 0
CONSTIPATION: 0
TROUBLE SWALLOWING: 0
COUGH: 0
NAUSEA: 0
STRIDOR: 0
CHEST TIGHTNESS: 0
ANAL BLEEDING: 0
COLOR CHANGE: 0

## 2023-03-07 NOTE — PROGRESS NOTES
[facility] [address] [address]  Dept: [phone]  Dept Fax: [phone]    Office Progress/Follow Up Note  Date ofpatient's visit: 3/14/2023  Patient's Name:  Alberto Radford YOB: 1940            Patient Care Team:  Evelin Mcdonnell MD as PCP - General (Internal Medicine)  Sanchez Small MD as PCP - Empaneled Provider  ================================================================    REASON FOR VISIT/CHIEF COMPLAINT:  Pain (Has pain on right side ), Knee Pain (Both knees), and Mass (Has growth left foot on big toe)    HISTORY OF PRESENTING ILLNESS:  History was obtained from: patient, electronic medical record. Reji Gilman a 80 y.o. is here for a follow up visit. Patient complaining today of left hallux toenail deformity which has been ongoing for 6 months. He has used an unknown cream on it without amelioration. Looks like a fungal infection. No signs of inflammation around the knee and patient is not diabetic. No real indication to treat so we will consider  topical Lamisil, to avoid possible drug interactions of oral Lamisil. Comorbidities include HTN on  Losartan 50mg daily. BP controlled. Regular checks at home. No headaches, visual changes, chest pain, SOB. Values run on average in 128/60. Most recent BMP in Sep 2021, acceptable, Cr 1.10. We will recheck CMP and CBC. BPH on finasteride 5mg daily and tamsulosin 0.4mg daily. Has 2-3 nightly episodes of urination. Referral for Urology in. Patient has not seen urologist in a while. We will recheck his PSA and send to urology if needed    Osteoarthritis of knees managed with tylenol. No swelling, redness or decreased range of motion. Consider diclo gel     Mild intermittent asthma adult onset  on albuterol and montelukast    Primary insomnia and he uses melatonin which helps. No further treatment needed.     On multiple multivits and supplements    Pertinent screening:  Colon cancer: nba normal Jan 2023. Mom had colon Ca at age 80 and had surgery. She lived till age 80. He is interested in cancer screenings  Osteoporosis: Not indicated  Prostate cancer: Patient with syptomatic BPH, most recent PSA 1.39 in July 2020. Will  recheck  Lung cancer screening: Not indicated  Depression screening: PHQ-2 of 0    Vaccinations: Needs shingles and Flu vaccines    Life-style:  Smoking: Non smoker  Alcohol: daily  use  Diet and Exercise: Exercises at Health system 3 times a week. Patient Active Problem List   Diagnosis    Osteoarthritis of knees    Benign prostatic hyperplasia with nocturia    Essential hypertension    Mild intermittent asthma without complication    Insomnia    Iliotibial band syndrome affecting left lower leg       Health Maintenance Due   Topic Date Due    DTaP/Tdap/Td vaccine (1 - Tdap) Never done    Shingles vaccine (1 of 2) Never done    Annual Wellness Visit (AWV)  09/23/2021    Flu vaccine (1) 08/01/2022       Allergies   Allergen Reactions    Aspirin     Ibuprofen          Current Outpatient Medications   Medication Sig Dispense Refill    terbinafine (LAMISIL AT) 1 % cream Apply topically 2 times daily.  15 g 4    diclofenac sodium (VOLTAREN) 1 % GEL Apply 4 g topically 4 times daily 350 g 3    montelukast (SINGULAIR) 10 MG tablet TAKE ONE TABLET BY MOUTH ONCE NIGHTLY 90 tablet 2    tamsulosin (FLOMAX) 0.4 MG capsule TAKE ONE CAPSULE BY MOUTH DAILY 90 capsule 2    losartan (COZAAR) 50 MG tablet       finasteride (PROSCAR) 5 MG tablet Take 1 tablet by mouth daily 90 tablet 1    Echinacea 400 MG CAPS Take by mouth      Misc Natural Products (PROSTATE SUPPORT PO) Take 1 tablet by mouth daily      GNP NORWEGIAN COD LIVER OIL PO Take 1 tablet by mouth daily      Green Tea 315 MG CAPS Take 315 mg by mouth daily      Flaxseed, Linseed, (FLAXSEED OIL) 1200 MG CAPS Take 1,200 mg by mouth daily      APPLE CIDER VINEGAR PO Take 1,200 mg by mouth daily      melatonin 3 MG TABS tablet Take 3 mg by mouth nightly as needed (1/2 tablet)      VITAMIN E NATURAL PO Take 180 mg by mouth daily      coenzyme Q10 100 MG CAPS capsule Take 100 mg by mouth daily      TURMERIC PO Take 1 tablet by mouth daily       vitamin C (ASCORBIC ACID) 500 MG tablet Take 250 mg by mouth daily      Omega-3 Fatty Acids (FISH OIL) 1360 MG CAPS Take 1,360 mg by mouth daily      fluticasone-salmeterol (ADVAIR DISKUS) 250-50 MCG/DOSE AEPB Inhale 1 puff into the lungs 2 times daily (Patient taking differently: Inhale 1 puff into the lungs as needed) 3 each 1    albuterol sulfate HFA (PROAIR HFA) 108 (90 Base) MCG/ACT inhaler 2 puffs inhaled every 4-6 hours as needed for cough, wheeze, chest tightness or shortness of breath (Patient taking differently: as needed 2 puffs inhaled every 4-6 hours as needed for cough, wheeze, chest tightness or shortness of breath) 1 Inhaler 6    fluticasone (VERAMYST) 27.5 MCG/SPRAY nasal spray 2 sprays to each nostril once daily (Patient taking differently: as needed 2 sprays to each nostril once daily) 1 Bottle 11    acetaminophen (TYLENOL) 500 MG tablet Take 1 tablet by mouth every 8 hours as needed for Pain (Patient taking differently: Take 500 mg by mouth as needed for Pain) 90 tablet 5    Minoxidil (ROGAINE MENS EX) Apply  topically. vitamin B-12 (CYANOCOBALAMIN) 1000 MCG tablet Take 1,000 mcg by mouth as needed Takes 1/2 tablet daily      Multiple Vitamins-Minerals (COMPLETE SENIOR PO) Take by mouth Takes 1/2 tablet daily      Selenium 200 MCG TABS Take  by mouth. Half tab daily       vitamin D (ERGOCALCIFEROL) 400 UNITS CAPS Take 400 Units by mouth daily Takes 1/2 tablet daily      calcium & magnesium carbonates (MYLANTA) per tablet Take  by mouth daily. Half tab        No current facility-administered medications for this visit. Social History     Tobacco Use    Smoking status: Never    Smokeless tobacco: Never   Substance Use Topics    Alcohol use:  Yes Alcohol/week: 4.0 standard drinks     Types: 4 Shots of liquor per week    Drug use: Yes     Comment: CBD        Family History   Problem Relation Age of Onset    Cancer Mother 80        colon    Heart Disease Mother     Heart Disease Father     Other Father 70        ALS        REVIEW OF SYSTEMS:  Review of Systems   Constitutional:  Negative for activity change, appetite change, fatigue and fever. HENT:  Negative for congestion and trouble swallowing. Eyes:  Negative for photophobia, pain and redness. Respiratory:  Negative for cough, chest tightness, shortness of breath, wheezing and stridor. Cardiovascular:  Negative for chest pain and leg swelling. Gastrointestinal:  Negative for anal bleeding, blood in stool, constipation, nausea and vomiting. Endocrine: Negative for polydipsia, polyphagia and polyuria. Genitourinary:  Positive for frequency. Negative for difficulty urinating, dysuria, enuresis, flank pain, hematuria and urgency. Musculoskeletal:  Positive for arthralgias. Negative for myalgias and neck pain. Skin:  Negative for color change and rash. Neurological:  Negative for dizziness, seizures, syncope, light-headedness and headaches. Psychiatric/Behavioral:  Positive for sleep disturbance. Negative for agitation, behavioral problems, confusion and hallucinations. PHYSICAL EXAM:  Vitals:    03/14/23 1327   BP: 131/71   Site: Left Upper Arm   Position: Sitting   Cuff Size: Medium Adult   Pulse: 76   Temp: 97.3 °F (36.3 °C)   SpO2: 96%   Weight: 164 lb 3.2 oz (74.5 kg)   Height: 5' 4\" (1.626 m)     BP Readings from Last 3 Encounters:   03/14/23 131/71   01/04/23 131/70   06/22/22 132/75        Physical Exam  Constitutional:       Appearance: He is normal weight. HENT:      Head: Normocephalic. Cardiovascular:      Rate and Rhythm: Normal rate and regular rhythm. Heart sounds: No murmur heard. Pulmonary:      Breath sounds: No wheezing or rhonchi.    Abdominal: Palpations: There is no mass. Tenderness: There is no abdominal tenderness. Musculoskeletal:      Right lower leg: No edema. Left lower leg: No edema. Feet:      Comments: Left hallux toenail fungal infection  Skin:     Findings: No erythema or rash. Neurological:      General: No focal deficit present. Mental Status: He is alert and oriented to person, place, and time. DIAGNOSTIC FINDINGS:  CBC:  Lab Results   Component Value Date/Time    WBC 6.6 09/23/2021 12:00 AM    HGB 15.7 09/23/2021 12:00 AM     09/23/2021 12:00 AM       BMP:    Lab Results   Component Value Date/Time     09/23/2021 12:00 AM    K 4.4 09/23/2021 12:00 AM     09/23/2021 12:00 AM    CO2 29 09/23/2021 12:00 AM    BUN 25 09/23/2021 12:00 AM    CREATININE 1.10 09/23/2021 12:00 AM    GLUCOSE 95 09/23/2021 12:00 AM    GLUCOSE 86 04/15/2019 02:24 AM       HEMOGLOBIN A1C: No results found for: LABA1C    FASTING LIPID PANEL:  Lab Results   Component Value Date    CHOL 188 11/21/2016    HDL 54 11/21/2016    TRIG 95 11/21/2016       ASSESSMENT AND PLAN:  Marion Buerger was seen today for pain, knee pain and mass. Diagnoses and all orders for this visit:    Fungal infection of toenail  -     terbinafine (LAMISIL AT) 1 % cream; Apply topically 2 times daily. Benign prostatic hyperplasia with nocturia  -     PSA Screening; Future    Essential hypertension  -     Comprehensive Metabolic Panel; Future  -     CBC with Auto Differential; Future    Primary insomnia    Osteoarthritis of knees  -     diclofenac sodium (VOLTAREN) 1 % GEL; Apply 4 g topically 4 times daily    Screening for prostate cancer  -     PSA Screening; Future        FOLLOW UP AND INSTRUCTIONS:  No follow-ups on file. Marion Buerger received counseling on the following healthy behaviors: nutrition, exercise, and medication adherence    Discussed use, benefit, and side effects of prescribed medications. Barriers to medication compliance addressed.   All patient questions answered. Pt voiced understanding. Patient given educational materials - see patient instructions    Mandy Sullivan MD  Internal Medicine Resident, PGY- [address] [place], [place]  3/14/2023, 2:28 PM  Attending Physician Statement  I have discussed the care of Lowell Alegria, including pertinent history and exam findings,  with the resident. I have reviewed the key elements of all parts of the encounter with the resident. I agree with the assessment, plan and orders as documented by the resident. Pt. Seen discussed and examined. [facility] Modifier)        This note is created with the assistance of a speech-recognition program. While intending to generate a document that actually reflects the content of thevisit, the document can still have some mistakes which may not have been identified and corrected by editing.

## 2023-03-14 ENCOUNTER — OFFICE VISIT (OUTPATIENT)
Dept: INTERNAL MEDICINE | Age: 83
End: 2023-03-14

## 2023-03-14 ENCOUNTER — HOSPITAL ENCOUNTER (OUTPATIENT)
Age: 83
Setting detail: SPECIMEN
Discharge: HOME | End: 2023-03-14

## 2023-03-14 VITALS
HEART RATE: 76 BPM | WEIGHT: 164.2 LBS | OXYGEN SATURATION: 96 % | DIASTOLIC BLOOD PRESSURE: 71 MMHG | HEIGHT: 64 IN | TEMPERATURE: 97.3 F | BODY MASS INDEX: 28.03 KG/M2 | SYSTOLIC BLOOD PRESSURE: 131 MMHG

## 2023-03-14 DIAGNOSIS — Z12.5 SCREENING FOR PROSTATE CANCER: ICD-10-CM

## 2023-03-14 DIAGNOSIS — N40.1 BENIGN PROSTATIC HYPERPLASIA WITH NOCTURIA: ICD-10-CM

## 2023-03-14 DIAGNOSIS — I10 ESSENTIAL HYPERTENSION: ICD-10-CM

## 2023-03-14 DIAGNOSIS — M17.10 ARTHRITIS OF KNEE: ICD-10-CM

## 2023-03-14 DIAGNOSIS — R35.1 BENIGN PROSTATIC HYPERPLASIA WITH NOCTURIA: ICD-10-CM

## 2023-03-14 DIAGNOSIS — F51.01 PRIMARY INSOMNIA: ICD-10-CM

## 2023-03-14 DIAGNOSIS — B35.1 FUNGAL INFECTION OF TOENAIL: Primary | ICD-10-CM

## 2023-03-14 PROBLEM — I20.1 ANGINA PECTORIS, VARIANT (HCC): Status: RESOLVED | Noted: 2021-08-12 | Resolved: 2023-03-14

## 2023-03-14 LAB
ABSOLUTE EOS #: 0.28 K/UL (ref 0–0.44)
ABSOLUTE IMMATURE GRANULOCYTE: <0.03 K/UL (ref 0–0.3)
ABSOLUTE LYMPH #: 1.52 K/UL (ref 1.1–3.7)
ABSOLUTE MONO #: 0.43 K/UL (ref 0.1–1.2)
ALBUMIN SERPL-MCNC: 4.1 G/DL (ref 3.5–5.2)
ALBUMIN/GLOBULIN RATIO: 1.4 (ref 1–2.5)
ALP SERPL-CCNC: 83 U/L (ref 40–129)
ALT SERPL-CCNC: 17 U/L (ref 5–41)
ANION GAP SERPL CALCULATED.3IONS-SCNC: 11 MMOL/L (ref 9–17)
AST SERPL-CCNC: 21 U/L
BASOPHILS # BLD: 1 % (ref 0–2)
BASOPHILS ABSOLUTE: 0.03 K/UL (ref 0–0.2)
BILIRUB SERPL-MCNC: 0.4 MG/DL (ref 0.3–1.2)
BUN SERPL-MCNC: 20 MG/DL (ref 8–23)
CALCIUM SERPL-MCNC: 9.3 MG/DL (ref 8.6–10.4)
CHLORIDE SERPL-SCNC: 105 MMOL/L (ref 98–107)
CO2 SERPL-SCNC: 24 MMOL/L (ref 20–31)
CREAT SERPL-MCNC: 0.85 MG/DL (ref 0.7–1.2)
EOSINOPHILS RELATIVE PERCENT: 5 % (ref 1–4)
GFR SERPL CREATININE-BSD FRML MDRD: >60 ML/MIN/1.73M2
GLUCOSE SERPL-MCNC: 90 MG/DL (ref 70–99)
HCT VFR BLD AUTO: 48.4 % (ref 40.7–50.3)
HGB BLD-MCNC: 15.4 G/DL (ref 13–17)
IMMATURE GRANULOCYTES: 0 %
LYMPHOCYTES # BLD: 25 % (ref 24–43)
MCH RBC QN AUTO: 30.2 PG (ref 25.2–33.5)
MCHC RBC AUTO-ENTMCNC: 31.8 G/DL (ref 28.4–34.8)
MCV RBC AUTO: 94.9 FL (ref 82.6–102.9)
MONOCYTES # BLD: 7 % (ref 3–12)
NRBC AUTOMATED: 0 PER 100 WBC
PDW BLD-RTO: 13.5 % (ref 11.8–14.4)
PLATELET # BLD AUTO: 227 K/UL (ref 138–453)
PMV BLD AUTO: 9.7 FL (ref 8.1–13.5)
POTASSIUM SERPL-SCNC: 4.3 MMOL/L (ref 3.7–5.3)
PROSTATE SPECIFIC ANTIGEN: 2.02 NG/ML
PROT SERPL-MCNC: 7 G/DL (ref 6.4–8.3)
RBC # BLD: 5.1 M/UL (ref 4.21–5.77)
SEG NEUTROPHILS: 62 % (ref 36–65)
SEGMENTED NEUTROPHILS ABSOLUTE COUNT: 3.88 K/UL (ref 1.5–8.1)
SODIUM SERPL-SCNC: 140 MMOL/L (ref 135–144)
WBC # BLD AUTO: 6.2 K/UL (ref 3.5–11.3)

## 2023-03-14 RX ORDER — PRENATAL VIT 91/IRON/FOLIC/DHA 28-975-200
COMBINATION PACKAGE (EA) ORAL
Qty: 15 G | Refills: 4 | Status: SHIPPED | OUTPATIENT
Start: 2023-03-14

## 2023-03-14 SDOH — ECONOMIC STABILITY: INCOME INSECURITY: HOW HARD IS IT FOR YOU TO PAY FOR THE VERY BASICS LIKE FOOD, HOUSING, MEDICAL CARE, AND HEATING?: NOT HARD AT ALL

## 2023-03-14 SDOH — ECONOMIC STABILITY: HOUSING INSECURITY
IN THE LAST 12 MONTHS, WAS THERE A TIME WHEN YOU DID NOT HAVE A STEADY PLACE TO SLEEP OR SLEPT IN A SHELTER (INCLUDING NOW)?: NO

## 2023-03-14 SDOH — ECONOMIC STABILITY: FOOD INSECURITY: WITHIN THE PAST 12 MONTHS, YOU WORRIED THAT YOUR FOOD WOULD RUN OUT BEFORE YOU GOT MONEY TO BUY MORE.: NEVER TRUE

## 2023-03-14 SDOH — ECONOMIC STABILITY: FOOD INSECURITY: WITHIN THE PAST 12 MONTHS, THE FOOD YOU BOUGHT JUST DIDN'T LAST AND YOU DIDN'T HAVE MONEY TO GET MORE.: NEVER TRUE

## 2023-03-14 NOTE — PATIENT INSTRUCTIONS
Labs given to patient, they will have them done before their next visit. Patient was put on a wait list and will be contacted to schedule their next follow up appointment once the schedule is available. If the patient is in need of an appointment before their next visit please call the office at [phone]. After Visit Summary  given and reviewed.   Eric Mane

## 2023-03-16 ENCOUNTER — TELEPHONE (OUTPATIENT)
Dept: INTERNAL MEDICINE | Age: 83
End: 2023-03-16

## 2023-03-16 NOTE — TELEPHONE ENCOUNTER
LVM for pt to callback to schedule AWV with his pcp.  There is some open appointments at the end of April/beginning of May

## 2023-03-17 NOTE — TELEPHONE ENCOUNTER
Health Maintenance   Topic Date Due    DTaP/Tdap/Td vaccine (1 - Tdap) Never done    Shingles vaccine (1 of 2) Never done    Annual Wellness Visit (AWV)  09/23/2021    Flu vaccine (1) 08/01/2022    Depression Screen  01/04/2024    Pneumococcal 65+ years Vaccine  Completed    COVID-19 Vaccine  Completed    Hepatitis A vaccine  Aged Out    Hib vaccine  Aged Out    Meningococcal (ACWY) vaccine  Aged Out             (applicable per patient's age: Cancer Screenings, Depression Screening, Fall Risk Screening, Immunizations)    LDL Calculated (mg/dL)   Date Value   11/21/2016 115     AST (U/L)   Date Value   03/14/2023 21     ALT (U/L)   Date Value   03/14/2023 17     BUN (mg/dL)   Date Value   03/14/2023 20      (goal A1C is < 7)   (goal LDL is <100) need 30-50% reduction from baseline     BP Readings from Last 3 Encounters:   03/14/23 131/71   01/04/23 131/70   06/22/22 132/75    (goal /80)      All Future Testing planned in [facility]:  Lab Frequency Next Occurrence   Basic Metabolic Panel Once 51/23/0984       Next Visit Date:  No future appointments.          Patient Active Problem List:     Osteoarthritis of knees     Benign prostatic hyperplasia with nocturia     Essential hypertension     Mild intermittent asthma without complication     Insomnia     Iliotibial band syndrome affecting left lower leg

## 2023-03-21 RX ORDER — ATENOLOL 25 MG/1
TABLET ORAL
Qty: 90 TABLET | OUTPATIENT
Start: 2023-03-21

## 2023-03-22 RX ORDER — ATENOLOL 25 MG/1
TABLET ORAL
Qty: 90 TABLET | OUTPATIENT
Start: 2023-03-22

## 2023-03-23 ENCOUNTER — TELEPHONE (OUTPATIENT)
Dept: INTERNAL MEDICINE | Age: 83
End: 2023-03-23

## 2023-03-23 DIAGNOSIS — I10 ESSENTIAL HYPERTENSION: Primary | ICD-10-CM

## 2023-03-23 DIAGNOSIS — R35.1 BENIGN PROSTATIC HYPERPLASIA WITH NOCTURIA: ICD-10-CM

## 2023-03-23 DIAGNOSIS — N40.1 BENIGN PROSTATIC HYPERPLASIA WITH NOCTURIA: ICD-10-CM

## 2023-03-23 DIAGNOSIS — J45.20 MILD INTERMITTENT ASTHMA WITHOUT COMPLICATION: ICD-10-CM

## 2023-03-23 RX ORDER — LOSARTAN POTASSIUM 50 MG/1
50 TABLET ORAL DAILY
Qty: 30 TABLET | Refills: 3 | Status: SHIPPED | OUTPATIENT
Start: 2023-03-23

## 2023-03-23 RX ORDER — MONTELUKAST SODIUM 10 MG/1
TABLET ORAL
Qty: 90 TABLET | Refills: 4 | Status: SHIPPED | OUTPATIENT
Start: 2023-03-23

## 2023-03-23 RX ORDER — TAMSULOSIN HYDROCHLORIDE 0.4 MG/1
CAPSULE ORAL
Qty: 90 CAPSULE | Refills: 3 | Status: SHIPPED | OUTPATIENT
Start: 2023-03-23

## 2023-03-23 NOTE — TELEPHONE ENCOUNTER
----- Message from Charmaine Angel sent at 3/22/2023  2:02 PM EDT -----  Subject: Message to Provider    QUESTIONS  Information for Provider? The pt is requesting a refill for atenolol 25mg,   however it was denied by the office. They need to know why it was denied. Pt thinks that it's because Dr. Kat Thomson is no longer at the office. Pt's   chart shows the RX was ended 6/22/22. Please advise.   ---------------------------------------------------------------------------  --------------  Jamison Duane INFO  [phone]; OK to leave message on voicemail  ---------------------------------------------------------------------------  --------------  SCRIPT ANSWERS  Relationship to Patient? Third Party  Third Party Type? Pharmacy? Representative Name?  [facility]

## 2023-03-23 NOTE — TELEPHONE ENCOUNTER
Called and discussed normal CBC except for elevated eosinophils, unremarkable CMP and PSA results. PSA increased from 1.39 about 2 years ago to 2.02. Discussed option of referral to Urology for biopsy consideration vs repeat PSA in 6 months to see if any further subsequent significant change. Patient opted for the latter. Will plan for repeat PSA in 6 months. Patient requested refill for medications - montelukast, flomax and losartan. Wendy Arellano MD  Internal Medicine Resident, PGY- [facility];  [place], [place]  3/23/2023, 11:09 AM

## 2023-03-23 NOTE — TELEPHONE ENCOUNTER
----- Message from Reynaldo Ramos MD sent at 3/21/2023  1:26 PM EDT -----  Regarding: forwarded results  Results forwarded to Dr. Ross Vela.  ----- Message -----  From: Yisel Hardwick Incoming Lab Results From [id number]  Sent: 3/14/2023   7:06 PM EDT  To: Reynaldo Ramos MD

## 2023-03-23 NOTE — TELEPHONE ENCOUNTER
Placed call to patient and notified that medication was discontinued due to control BP with losartan

## 2023-04-04 ENCOUNTER — OFFICE VISIT (OUTPATIENT)
Dept: INTERNAL MEDICINE | Age: 83
End: 2023-04-04
Payer: MEDICARE

## 2023-04-04 VITALS
DIASTOLIC BLOOD PRESSURE: 84 MMHG | TEMPERATURE: 98.2 F | HEART RATE: 71 BPM | WEIGHT: 163.8 LBS | SYSTOLIC BLOOD PRESSURE: 145 MMHG | BODY MASS INDEX: 27.96 KG/M2 | HEIGHT: 64 IN

## 2023-04-04 DIAGNOSIS — I10 ESSENTIAL HYPERTENSION: Primary | ICD-10-CM

## 2023-04-04 DIAGNOSIS — J45.20 INTERMITTENT ASTHMA, WELL CONTROLLED: ICD-10-CM

## 2023-04-04 DIAGNOSIS — R35.1 BENIGN PROSTATIC HYPERPLASIA WITH NOCTURIA: ICD-10-CM

## 2023-04-04 DIAGNOSIS — J33.9 NASAL POLYPS: ICD-10-CM

## 2023-04-04 DIAGNOSIS — N40.1 BENIGN PROSTATIC HYPERPLASIA WITH NOCTURIA: ICD-10-CM

## 2023-04-04 DIAGNOSIS — B35.1 FUNGAL INFECTION OF TOENAIL: ICD-10-CM

## 2023-04-04 PROBLEM — Z00.00 ENCOUNTER FOR ANNUAL WELLNESS VISIT (AWV) IN MEDICARE PATIENT: Status: ACTIVE | Noted: 2023-04-04

## 2023-04-04 PROCEDURE — 99211 OFF/OP EST MAY X REQ PHY/QHP: CPT

## 2023-04-04 RX ORDER — ALBUTEROL SULFATE 90 UG/1
2 AEROSOL, METERED RESPIRATORY (INHALATION) EVERY 4 HOURS PRN
Qty: 1 EACH | Refills: 6 | Status: SHIPPED | OUTPATIENT
Start: 2023-04-04

## 2023-04-04 ASSESSMENT — ENCOUNTER SYMPTOMS
EYE REDNESS: 0
NAUSEA: 0
CHEST TIGHTNESS: 0
BLOOD IN STOOL: 0
ANAL BLEEDING: 0
CONSTIPATION: 0
SHORTNESS OF BREATH: 0
COUGH: 0
STRIDOR: 0
VOMITING: 0
PHOTOPHOBIA: 0
EYE PAIN: 0
WHEEZING: 0
TROUBLE SWALLOWING: 0
COLOR CHANGE: 0

## 2023-04-04 ASSESSMENT — PATIENT HEALTH QUESTIONNAIRE - PHQ9
SUM OF ALL RESPONSES TO PHQ QUESTIONS 1-9: 0
SUM OF ALL RESPONSES TO PHQ QUESTIONS 1-9: 0
SUM OF ALL RESPONSES TO PHQ9 QUESTIONS 1 & 2: 0
SUM OF ALL RESPONSES TO PHQ QUESTIONS 1-9: 0
1. LITTLE INTEREST OR PLEASURE IN DOING THINGS: 0
2. FEELING DOWN, DEPRESSED OR HOPELESS: 0
SUM OF ALL RESPONSES TO PHQ QUESTIONS 1-9: 0

## 2023-04-04 ASSESSMENT — LIFESTYLE VARIABLES
HOW OFTEN DO YOU HAVE A DRINK CONTAINING ALCOHOL: 2-4 TIMES A MONTH
HOW MANY STANDARD DRINKS CONTAINING ALCOHOL DO YOU HAVE ON A TYPICAL DAY: 3 OR 4

## 2023-04-04 NOTE — PROGRESS NOTES
Attending Physician Statement  I have discussed the care of Shaggy Nuñez, including pertinent history and exam findings with the resident. I have reviewed the key elements of all parts of the encounter with the resident. I agree with the assessment, and status of the problem list as documented. and this was also documented by the resident. The medication list was reviewed with the resident and is up to date. Diagnosis Orders   1. Essential hypertension        2. Fungal infection of toenail        3. Benign prostatic hyperplasia with nocturia  JONAH - Fran Mitchell MD, Urology, [facility]      4. Nasal polyps  fluticasone (VERAMYST) 27.5 MCG/SPRAY nasal spray      5.  Intermittent asthma, well controlled  albuterol sulfate HFA (PROAIR HFA) 108 (90 Base) MCG/ACT inhaler           Yessica Alfaro MD   Attending Physician, [facility]   Faculty, Internal Medicine Residency Program  [address] [facility]
stridor. Cardiovascular:  Negative for chest pain and leg swelling. Gastrointestinal:  Negative for anal bleeding, blood in stool, constipation, nausea and vomiting. Endocrine: Negative for polydipsia, polyphagia and polyuria. Genitourinary:  Positive for frequency. Negative for difficulty urinating, dysuria, enuresis, flank pain, hematuria and urgency. Nocturia, 2-3 times per night. Musculoskeletal:  Negative for arthralgias, myalgias and neck pain. Skin:  Negative for color change and rash. Neurological:  Negative for dizziness, seizures, syncope, light-headedness and headaches. Psychiatric/Behavioral:  Negative for agitation, behavioral problems, confusion, hallucinations and sleep disturbance. PHYSICAL EXAM:  Vitals:    04/04/23 1426   BP: (!) 145/84   Site: Right Upper Arm   Position: Sitting   Cuff Size: Medium Adult   Pulse: 71   Temp: 98.2 °F (36.8 °C)   Weight: 163 lb 12.8 oz (74.3 kg)   Height: 5' 4\" (1.626 m)     BP Readings from Last 3 Encounters:   04/04/23 (!) 145/84   03/14/23 131/71   01/04/23 131/70        Physical Exam  Constitutional:       Appearance: He is normal weight. HENT:      Head: Normocephalic. Cardiovascular:      Rate and Rhythm: Normal rate and regular rhythm. Heart sounds: No murmur heard. Pulmonary:      Breath sounds: No wheezing or rhonchi. Abdominal:      Palpations: There is no mass. Tenderness: There is no abdominal tenderness. Musculoskeletal:      Right lower leg: No edema. Left lower leg: No edema. Skin:     Findings: No erythema or rash. Neurological:      General: No focal deficit present. Mental Status: He is alert and oriented to person, place, and time.          DIAGNOSTIC FINDINGS:  CBC:  Lab Results   Component Value Date/Time    WBC 6.2 03/14/2023 03:15 PM    HGB 15.4 03/14/2023 03:15 PM     03/14/2023 03:15 PM       BMP:    Lab Results   Component Value Date/Time     03/14/2023 03:15 PM    K

## 2023-04-11 ENCOUNTER — TELEPHONE (OUTPATIENT)
Dept: INTERNAL MEDICINE | Age: 83
End: 2023-04-11

## 2023-04-11 DIAGNOSIS — J45.20 MILD INTERMITTENT ASTHMA WITHOUT COMPLICATION: Primary | ICD-10-CM

## 2023-04-17 RX ORDER — FLUTICASONE PROPIONATE AND SALMETEROL 250; 50 UG/1; UG/1
1 POWDER RESPIRATORY (INHALATION) EVERY 12 HOURS
Qty: 60 EACH | Refills: 3 | Status: SHIPPED | OUTPATIENT
Start: 2023-04-17

## 2023-04-17 NOTE — TELEPHONE ENCOUNTER
Advair ordered for frequent asthma flares. Jayshree Baptiste MD  Internal Medicine Resident, PGY- [facility];  [place], [place]  4/17/2023, 7:33 PM

## 2023-04-18 ENCOUNTER — TELEPHONE (OUTPATIENT)
Dept: INTERNAL MEDICINE | Age: 83
End: 2023-04-18

## 2023-04-20 NOTE — TELEPHONE ENCOUNTER
PC to pt to let him know Advair sent to pharmacy, no answer. Left HIPAA compliant message identifying self and nature of call, requested call back to writer, phone number given.

## 2023-06-09 DIAGNOSIS — N40.1 BENIGN PROSTATIC HYPERPLASIA WITH NOCTURIA: ICD-10-CM

## 2023-06-09 DIAGNOSIS — R35.1 BENIGN PROSTATIC HYPERPLASIA WITH NOCTURIA: ICD-10-CM

## 2023-06-12 NOTE — TELEPHONE ENCOUNTER
Medication refill for Proscar    Last visit: 4/4/23  Last Med refill: 12/11/22  Does patient have enough medication for 72 hours: No:     Next Visit Date:  No future appointments.     Health Maintenance   Topic Date Due    DTaP/Tdap/Td vaccine (1 - Tdap) Never done    Shingles vaccine (1 of 2) Never done    Annual Wellness Visit (AWV)  09/23/2021    Flu vaccine (Season Ended) 08/01/2023    Depression Screen  04/04/2024    Pneumococcal 65+ years Vaccine  Completed    COVID-19 Vaccine  Completed    Hepatitis A vaccine  Aged Out    Hib vaccine  Aged Out    Meningococcal (ACWY) vaccine  Aged Out       No results found for: LABA1C          ( goal A1C is < 7)   No results found for: LABMICR  LDL Calculated (mg/dL)   Date Value   11/21/2016 115       (goal LDL is <100)   AST (U/L)   Date Value   03/14/2023 21     ALT (U/L)   Date Value   03/14/2023 17     BUN (mg/dL)   Date Value   03/14/2023 20     BP Readings from Last 3 Encounters:   04/04/23 (!) 145/84   03/14/23 131/71   01/04/23 131/70          (goal 120/80)    All Future Testing planned in [facility]  Lab Frequency Next Occurrence   Basic Metabolic Panel Once 16/61/9846   PSA, Diagnostic Once 09/23/2023               Patient Active Problem List:     Osteoarthritis of knees     Benign prostatic hyperplasia with nocturia     Essential hypertension     Mild intermittent asthma without complication     Insomnia     Iliotibial band syndrome affecting left lower leg     Encounter for annual wellness visit (AWV) in Medicare patient

## 2023-06-13 RX ORDER — FINASTERIDE 5 MG/1
TABLET, FILM COATED ORAL
Qty: 90 TABLET | Refills: 5 | Status: SHIPPED | OUTPATIENT
Start: 2023-06-13

## 2023-06-23 ENCOUNTER — TELEPHONE (OUTPATIENT)
Dept: INTERNAL MEDICINE | Age: 83
End: 2023-06-23

## 2023-06-23 NOTE — TELEPHONE ENCOUNTER
Pt called in ,says he has a cough and phlegm in throat and wants antibiotics.  Advised him to walk in clinic

## 2023-06-27 ENCOUNTER — OFFICE VISIT (OUTPATIENT)
Dept: INTERNAL MEDICINE | Age: 83
End: 2023-06-27
Payer: MEDICARE

## 2023-06-27 VITALS
HEIGHT: 65 IN | BODY MASS INDEX: 26.49 KG/M2 | WEIGHT: 159 LBS | OXYGEN SATURATION: 97 % | DIASTOLIC BLOOD PRESSURE: 82 MMHG | TEMPERATURE: 98.4 F | HEART RATE: 74 BPM | SYSTOLIC BLOOD PRESSURE: 139 MMHG

## 2023-06-27 DIAGNOSIS — B35.3 TINEA PEDIS OF LEFT FOOT: ICD-10-CM

## 2023-06-27 DIAGNOSIS — J40 BRONCHITIS: Primary | ICD-10-CM

## 2023-06-27 DIAGNOSIS — J45.30 MILD PERSISTENT ASTHMA WITHOUT COMPLICATION: ICD-10-CM

## 2023-06-27 PROCEDURE — 3074F SYST BP LT 130 MM HG: CPT | Performed by: STUDENT IN AN ORGANIZED HEALTH CARE EDUCATION/TRAINING PROGRAM

## 2023-06-27 PROCEDURE — G8417 CALC BMI ABV UP PARAM F/U: HCPCS | Performed by: STUDENT IN AN ORGANIZED HEALTH CARE EDUCATION/TRAINING PROGRAM

## 2023-06-27 PROCEDURE — 99213 OFFICE O/P EST LOW 20 MIN: CPT | Performed by: STUDENT IN AN ORGANIZED HEALTH CARE EDUCATION/TRAINING PROGRAM

## 2023-06-27 PROCEDURE — 1036F TOBACCO NON-USER: CPT | Performed by: STUDENT IN AN ORGANIZED HEALTH CARE EDUCATION/TRAINING PROGRAM

## 2023-06-27 PROCEDURE — 3078F DIAST BP <80 MM HG: CPT | Performed by: STUDENT IN AN ORGANIZED HEALTH CARE EDUCATION/TRAINING PROGRAM

## 2023-06-27 PROCEDURE — 1123F ACP DISCUSS/DSCN MKR DOCD: CPT | Performed by: STUDENT IN AN ORGANIZED HEALTH CARE EDUCATION/TRAINING PROGRAM

## 2023-06-27 PROCEDURE — G8427 DOCREV CUR MEDS BY ELIG CLIN: HCPCS | Performed by: STUDENT IN AN ORGANIZED HEALTH CARE EDUCATION/TRAINING PROGRAM

## 2023-06-27 RX ORDER — GUAIFENESIN 600 MG/1
600 TABLET, EXTENDED RELEASE ORAL 2 TIMES DAILY
Qty: 30 TABLET | Refills: 0 | Status: CANCELLED | OUTPATIENT
Start: 2023-06-27 | End: 2023-07-12

## 2023-06-27 RX ORDER — GUAIFENESIN/DEXTROMETHORPHAN 100-10MG/5
5 SYRUP ORAL 3 TIMES DAILY PRN
Qty: 120 ML | Refills: 0 | Status: SHIPPED | OUTPATIENT
Start: 2023-06-27 | End: 2023-07-07

## 2023-06-27 RX ORDER — BENZONATATE 100 MG/1
100 CAPSULE ORAL 3 TIMES DAILY PRN
COMMUNITY
Start: 2023-06-23 | End: 2023-06-28

## 2023-06-27 RX ORDER — AZITHROMYCIN 250 MG/1
TABLET, FILM COATED ORAL
COMMUNITY
Start: 2023-06-23 | End: 2023-06-28

## 2023-06-27 RX ORDER — GUAIFENESIN 200 MG/10ML
200 LIQUID ORAL 3 TIMES DAILY PRN
Qty: 236 ML | Refills: 0 | Status: CANCELLED | OUTPATIENT
Start: 2023-06-27

## 2023-06-27 RX ORDER — PREDNISONE 20 MG/1
40 TABLET ORAL DAILY
Qty: 10 TABLET | Refills: 0 | COMMUNITY
Start: 2023-06-23 | End: 2023-06-28

## 2023-06-28 ASSESSMENT — ENCOUNTER SYMPTOMS
VOICE CHANGE: 0
SHORTNESS OF BREATH: 0
WHEEZING: 0
GASTROINTESTINAL NEGATIVE: 1
SINUS PAIN: 0
SINUS PRESSURE: 0
COUGH: 1
TROUBLE SWALLOWING: 0
SORE THROAT: 0

## 2023-07-20 ENCOUNTER — OFFICE VISIT (OUTPATIENT)
Dept: INTERNAL MEDICINE | Age: 83
End: 2023-07-20

## 2023-07-20 VITALS
BODY MASS INDEX: 25.49 KG/M2 | DIASTOLIC BLOOD PRESSURE: 65 MMHG | TEMPERATURE: 98.1 F | HEIGHT: 65 IN | SYSTOLIC BLOOD PRESSURE: 110 MMHG | WEIGHT: 153 LBS | HEART RATE: 71 BPM | OXYGEN SATURATION: 98 %

## 2023-07-20 DIAGNOSIS — G47.30 SLEEP APNEA, UNSPECIFIED TYPE: ICD-10-CM

## 2023-07-20 DIAGNOSIS — R09.81 NASAL CONGESTION: Primary | ICD-10-CM

## 2023-07-20 ASSESSMENT — ENCOUNTER SYMPTOMS
DIARRHEA: 0
SORE THROAT: 0
VOICE CHANGE: 0
COUGH: 1
ABDOMINAL PAIN: 0
SHORTNESS OF BREATH: 0
TROUBLE SWALLOWING: 0
SINUS PRESSURE: 0
BACK PAIN: 0
EYE PAIN: 0
WHEEZING: 0
SINUS PAIN: 0
CONSTIPATION: 0
RECTAL PAIN: 0
EYE REDNESS: 0

## 2023-07-20 NOTE — PROGRESS NOTES
[facility]  [facility] [address]   [address] [facility] [address]  Dept: [phone]  Dept Fax: [phone]    Office Progress/Follow Up Note  Date ofpatient's visit: 7/20/2023  Patient's Name:  Troy Escobedo YOB: 1940            Patient Care Team:  Janene Tom MD as PCP - General  Roz Maravilla MD as PCP - Empaneled Provider  ================================================================    REASON FOR VISIT/CHIEF COMPLAINT:  Congestion (Cough lessing / a lot nasal discharge )    HISTORY OF PRESENTING ILLNESS:  Briefly patient is an 58-year-old male who came for a same-day visit. His present complaints during this visit include cough and persistent nasal congestion. Patient was previously seen in the clinic, last month he was seen in urgent care was prescribed Z-Nito and prednisone which the patient finished. According to the patient he had significant symptomatic relief after he finished the course of treatment. His present complaint during this clinic visit includes persistent nasal congestion. He reports that he sometimes has to wake up at night due to nasal congestion has to clear his nose. He denies any symptoms of GERD and never used any acid reducing medication. He does not have significant cough, no postnasal drip, no difficulty swallowing or bringing out his secretions. Denies any allergies. Patient does not seem to be compliant with his medications.    -Stop bang score 5.     Patient Active Problem List   Diagnosis    Osteoarthritis of knees    Benign prostatic hyperplasia with nocturia    Essential hypertension    Mild intermittent asthma without complication    Insomnia    Iliotibial band syndrome affecting left lower leg    Encounter for annual wellness visit (AWV) in Medicare patient       Health Maintenance Due   Topic Date Due    DTaP/Tdap/Td vaccine (1 - Tdap) Never done    Shingles vaccine (1 of 2) Never done    Annual Wellness

## 2023-07-24 DIAGNOSIS — J40 BRONCHITIS: ICD-10-CM

## 2023-07-24 NOTE — TELEPHONE ENCOUNTER
Pt calling in stating he is leaving town on Wednesday and is not quite back to 100% from URI. Pt requesting a refill on the cough syrup.

## 2023-07-24 NOTE — TELEPHONE ENCOUNTER
After Visit Summary   4/26/2017    Lorena Sears    MRN: [id number]           Patient Information     Date Of Birth          1970        Visit Information        Provider Department      4/26/2017 7:14 PM Trisha Guerra MD [facility]        Today's Diagnoses     Rash    -  1       Follow-ups after your visit        Your next 10 appointments already scheduled     May 02, 2017 11:20 AM CDT   MyChart Skin Evaluation with Trisha Guerra MD   [facility]    [address] [place] [address]  [address]  [facility] [id number] [phone]              Who to contact     If you have questions or need follow up information about today's clinic visit or your schedule please contact [facility] directly at [phone].  Normal or non-critical lab and imaging results will be communicated to you by MyChart, letter or phone within 4 business days after the clinic has received the results. If you do not hear from us within 7 days, please contact the clinic through MyChart or phone. If you have a critical or abnormal lab result, we will notify you by phone as soon as possible.  Submit refill requests through [employer] or call your pharmacy and they will forward the refill request to us. Please allow 3 business days for your refill to be completed.          Additional Information About Your Visit        MyChart Information     [employer] gives you secure access to your electronic health record. If you see a primary care provider, you can also send messages to your care team and make appointments. If you have questions, please call your primary care clinic.  If you do not have a primary care provider, please call [phone] and they will assist you.        Care EveryWhere ID     This is your Care EveryWhere ID. This could be used by other organizations to access your [place] medical records  [id number]         Blood Pressure from Last 3  Last visit: 07/20/23  Last Med refill:   Does patient have enough medication for 72 hours: no    Next Visit Date:  Future Appointments   Date Time Provider [address]   8/22/2023 11:10 AM Randall Ross MD [address] Maintenance   Topic Date Due    DTaP/Tdap/Td vaccine (1 - Tdap) Never done    Shingles vaccine (1 of 2) Never done    Annual Wellness Visit (AWV)  09/23/2021    Flu vaccine (1) 08/01/2023    Depression Screen  04/04/2024    Pneumococcal 65+ years Vaccine  Completed    COVID-19 Vaccine  Completed    Hepatitis A vaccine  Aged Out    Hib vaccine  Aged Out    Meningococcal (ACWY) vaccine  Aged Out       No results found for: LABA1C          ( goal A1C is < 7)   No components found for: LABMICR  LDL Calculated (mg/dL)   Date Value   11/21/2016 115       (goal LDL is <100)   AST (U/L)   Date Value   03/14/2023 21     ALT (U/L)   Date Value   03/14/2023 17     BUN (mg/dL)   Date Value   03/14/2023 20     BP Readings from Last 3 Encounters:   07/20/23 110/65   06/27/23 139/82   04/04/23 (!) 145/84          (goal 120/80)    All Future Testing planned in [facility]  Lab Frequency Next Occurrence   Basic Metabolic Panel Once 20/68/2085   PSA, Diagnostic Once 09/23/2023               Patient Active Problem List:     Osteoarthritis of knees     Benign prostatic hyperplasia with nocturia     Essential hypertension     Mild intermittent asthma without complication     Insomnia     Iliotibial band syndrome affecting left lower leg     Encounter for annual wellness visit (AWV) in Medicare patient Encounters:   06/09/16 112/66   05/19/16 130/82   11/03/15 122/70    Weight from Last 3 Encounters:   06/09/16 187 lb 8 oz (85 kg)   05/19/16 188 lb 1 oz (85.3 kg)   03/19/15 185 lb 7 oz (84.1 kg)              Today, you had the following     No orders found for display       Primary Care Provider Office Phone # Fax #    Trisha Guerra [phone]       [facility] [address] [place] [address] [address] [place] [address]        Thank you!     Thank you for choosing [facility]  for your care. Our goal is always to provide you with excellent care. Hearing back from our patients is one way we can continue to improve our services. Please take a few minutes to complete the written survey that you may receive in the mail after your visit with us. Thank you!             Your Updated Medication List - Protect others around you: Learn how to safely use, store and throw away your medicines at www.disposemymeds.org.          This list is accurate as of: 4/26/17 11:59 PM.  Always use your most recent med list.                   Brand Name Dispense Instructions for use    buPROPion 150 MG 24 hr tablet    WELLBUTRIN XL    90 tablet    Take 1 tablet (150 mg) by mouth every morning       cyclobenzaprine 10 MG tablet    FLEXERIL    90 tablet    Take 1 tablet (10 mg) by mouth nightly as needed for muscle spasms       ibuprofen 200 MG tablet    ADVIL/MOTRIN     Take 200 mg by mouth every 4 hours as needed.       loratadine 10 MG tablet    CLARITIN    90 tablet    Take 1 tablet (10 mg) by mouth daily       losartan 50 MG tablet    COZAAR    90 tablet    Take 1 tablet (50 mg) by mouth daily       norethindrone-ethinyl estradiol 1-5 MG-MCG per tablet    FEMHRT 1/5    90 tablet    Take 1 tablet by mouth daily       triamterene-hydrochlorothiazide 37.5-25 MG per tablet    MAXZIDE-25    90 tablet    Take 1 tablet by mouth daily       zolpidem 10 MG tablet    AMBIEN    30 tablet    Take 1 tablet (10 mg)  by mouth at bedtime as needed for sleep.

## 2023-07-25 RX ORDER — GUAIFENESIN AND DEXTROMETHORPHAN HYDROBROMIDE 100; 10 MG/5ML; MG/5ML
SOLUTION ORAL
Qty: 120 ML | Refills: 0 | Status: SHIPPED | OUTPATIENT
Start: 2023-07-25

## 2023-07-27 RX ORDER — NASAL AIRFLOW STRIPS
1 STRIP TOPICAL DAILY PRN
Qty: 30 STRIP | Refills: 0 | Status: SHIPPED | OUTPATIENT
Start: 2023-07-27

## 2023-07-27 RX ORDER — AZELASTINE HYDROCHLORIDE, FLUTICASONE PROPIONATE 137; 50 UG/1; UG/1
1 SPRAY, METERED NASAL 2 TIMES DAILY PRN
Qty: 1 EACH | Refills: 1 | Status: SHIPPED | OUTPATIENT
Start: 2023-07-27 | End: 2023-08-07 | Stop reason: ALTCHOICE

## 2023-08-07 NOTE — PROGRESS NOTES
Writer received several requests for PA for azelastine-fluticasone r/t recent URI. Pt states he received nasal spray and does not need this one.

## 2023-08-10 NOTE — PROGRESS NOTES
Attending Physician Statement  I have discussed the care of [place] Milks including pertinent history and exam findings,  with the resident. I have reviewed the key elements of all parts of the encounter with the resident. I agree with the assessment, plan and orders as documented by the resident.   (GE Modifier)    MD PANCHITO Pena  Attending Physician, [address] [place] [address] Internal Medicine Residency Program  [address] [facility]  8/10/2023, 10:36 AM

## 2023-08-22 ENCOUNTER — OFFICE VISIT (OUTPATIENT)
Dept: INTERNAL MEDICINE | Age: 83
End: 2023-08-22
Payer: MEDICARE

## 2023-08-22 VITALS
HEART RATE: 70 BPM | TEMPERATURE: 98.8 F | OXYGEN SATURATION: 98 % | SYSTOLIC BLOOD PRESSURE: 122 MMHG | DIASTOLIC BLOOD PRESSURE: 67 MMHG | BODY MASS INDEX: 26.86 KG/M2 | WEIGHT: 161.4 LBS

## 2023-08-22 DIAGNOSIS — R35.1 BENIGN PROSTATIC HYPERPLASIA WITH NOCTURIA: ICD-10-CM

## 2023-08-22 DIAGNOSIS — F51.01 PRIMARY INSOMNIA: ICD-10-CM

## 2023-08-22 DIAGNOSIS — M17.10 ARTHRITIS OF KNEE: ICD-10-CM

## 2023-08-22 DIAGNOSIS — N40.1 BENIGN PROSTATIC HYPERPLASIA WITH NOCTURIA: ICD-10-CM

## 2023-08-22 DIAGNOSIS — I10 ESSENTIAL HYPERTENSION: Primary | ICD-10-CM

## 2023-08-22 PROBLEM — M76.32 ILIOTIBIAL BAND SYNDROME AFFECTING LEFT LOWER LEG: Status: RESOLVED | Noted: 2021-08-12 | Resolved: 2023-08-22

## 2023-08-22 PROBLEM — Z00.00 ENCOUNTER FOR ANNUAL WELLNESS VISIT (AWV) IN MEDICARE PATIENT: Status: RESOLVED | Noted: 2023-04-04 | Resolved: 2023-08-22

## 2023-08-22 PROCEDURE — 3078F DIAST BP <80 MM HG: CPT

## 2023-08-22 PROCEDURE — G8417 CALC BMI ABV UP PARAM F/U: HCPCS

## 2023-08-22 PROCEDURE — G8427 DOCREV CUR MEDS BY ELIG CLIN: HCPCS

## 2023-08-22 PROCEDURE — 1123F ACP DISCUSS/DSCN MKR DOCD: CPT

## 2023-08-22 PROCEDURE — 99213 OFFICE O/P EST LOW 20 MIN: CPT

## 2023-08-22 PROCEDURE — 3074F SYST BP LT 130 MM HG: CPT

## 2023-08-22 PROCEDURE — 1036F TOBACCO NON-USER: CPT

## 2023-08-22 ASSESSMENT — ENCOUNTER SYMPTOMS
SHORTNESS OF BREATH: 0
VOMITING: 0
BLOOD IN STOOL: 0
STRIDOR: 0
NAUSEA: 0
PHOTOPHOBIA: 0
COLOR CHANGE: 0
TROUBLE SWALLOWING: 0
CONSTIPATION: 0
CHEST TIGHTNESS: 0
EYE REDNESS: 0
EYE PAIN: 0
COUGH: 0
ANAL BLEEDING: 0
WHEEZING: 0

## 2023-08-22 ASSESSMENT — PATIENT HEALTH QUESTIONNAIRE - PHQ9
SUM OF ALL RESPONSES TO PHQ QUESTIONS 1-9: 0
2. FEELING DOWN, DEPRESSED OR HOPELESS: 0
SUM OF ALL RESPONSES TO PHQ9 QUESTIONS 1 & 2: 0
1. LITTLE INTEREST OR PLEASURE IN DOING THINGS: 0
SUM OF ALL RESPONSES TO PHQ QUESTIONS 1-9: 0

## 2023-10-20 ENCOUNTER — TELEPHONE (OUTPATIENT)
Dept: INTERNAL MEDICINE | Age: 83
End: 2023-10-20

## 2023-10-20 NOTE — TELEPHONE ENCOUNTER
PC to pt and no answer.  Lvm asking for pt to callback to schedule AWV with Elsie on days when pcp is in clinic

## 2023-11-09 ENCOUNTER — TELEPHONE (OUTPATIENT)
Dept: INTERNAL MEDICINE | Age: 83
End: 2023-11-09

## 2023-11-14 DIAGNOSIS — F51.01 PRIMARY INSOMNIA: ICD-10-CM

## 2023-11-14 DIAGNOSIS — J45.30 MILD PERSISTENT ASTHMA WITHOUT COMPLICATION: ICD-10-CM

## 2023-11-14 DIAGNOSIS — I10 ESSENTIAL HYPERTENSION: Primary | ICD-10-CM

## 2023-11-14 RX ORDER — LOSARTAN POTASSIUM 50 MG/1
50 TABLET ORAL DAILY
Qty: 90 TABLET | Refills: 3 | Status: SHIPPED | OUTPATIENT
Start: 2023-11-14

## 2023-11-14 RX ORDER — MONTELUKAST SODIUM 10 MG/1
10 TABLET ORAL NIGHTLY
Qty: 90 TABLET | Refills: 2 | Status: SHIPPED | OUTPATIENT
Start: 2023-11-14

## 2023-11-14 NOTE — TELEPHONE ENCOUNTER
Pt returned call to office yesterday, see phone encounter 2023. Pt left VM for writer stating he was upset about medications not being refilled and that he had an appt in March and felt that should suffice for AWV. PC to pt, he states atenolol has been denied several times. Writer let pt know that no requests for atenolol have been received for this medication and that writer does not show pt is taking atenolol since . Pt states the last time the medication was filled was 3/17/2023. Review of chart shows pt requested atenolol several times in March of this year and the refill was denied d/t being discontinued. Writer let pt know that this medication has been discontinued and that he should not be taking atenolol any longer. Pt verbalized understanding. Pt states refills on losartan and montelukast were also denied. Writer let pt know that we sent a year supply of montelukast in March, writer will call pharmacy. Writer also let pt know that we have not received any requests for losartan from him nor pharmacy. Writer made sure pt aware to contact office directly for future refills as we are not getting requests from pharmacy apparently. PC to pharmacy re: montelukast. Pharmacist states they were refilling the montelukast off an old script. She states the script from March has  on their end, pt needs new script. New script pended for montelukast. Pt ended up contacting cardiology for refill on his losartan, new script pended to place on file at pharmacy until pt is due for refill. Pt is due for 6 month f/u in February.

## 2023-11-17 NOTE — TELEPHONE ENCOUNTER
PC to pt to make sure he is aware scripts sent, no answer. Left HIPAA compliant message identifying self and nature of call, requested call back to writer if any problems, phone numbers given. Also reminded pt that writer would like to complete AWV if possible before end of year.

## 2023-11-28 ENCOUNTER — TELEPHONE (OUTPATIENT)
Dept: INTERNAL MEDICINE | Age: 83
End: 2023-11-28

## 2023-11-28 DIAGNOSIS — J45.20 MILD INTERMITTENT ASTHMA WITHOUT COMPLICATION: Primary | ICD-10-CM

## 2023-11-28 DIAGNOSIS — J18.9 PNEUMONIA OF RIGHT MIDDLE LOBE DUE TO INFECTIOUS ORGANISM: Primary | ICD-10-CM

## 2023-11-28 DIAGNOSIS — J45.901 MODERATE ASTHMA WITH ACUTE EXACERBATION, UNSPECIFIED WHETHER PERSISTENT: ICD-10-CM

## 2023-11-28 NOTE — TELEPHONE ENCOUNTER
Pt has been having trouble with his asthma, went to Urgent Care and was given medication for suspected pneumonia. Pt is requesting referral to pulmonologist for ongoing lung issues. Pt states he used to have a pulmonologist but provider passed away. Referral pended, please route back to me so I can notify pt how to schedule. Thank you.

## 2024-01-12 ENCOUNTER — OFFICE VISIT (OUTPATIENT)
Dept: INTERNAL MEDICINE | Age: 84
End: 2024-01-12
Payer: MEDICARE

## 2024-01-12 VITALS
OXYGEN SATURATION: 98 % | HEART RATE: 74 BPM | WEIGHT: 152 LBS | TEMPERATURE: 97.7 F | SYSTOLIC BLOOD PRESSURE: 110 MMHG | BODY MASS INDEX: 25.29 KG/M2 | DIASTOLIC BLOOD PRESSURE: 73 MMHG

## 2024-01-12 DIAGNOSIS — J45.20 INTERMITTENT ASTHMA, WELL CONTROLLED: ICD-10-CM

## 2024-01-12 DIAGNOSIS — J45.31 MILD PERSISTENT ASTHMA WITH EXACERBATION: Primary | ICD-10-CM

## 2024-01-12 DIAGNOSIS — R05.3 CHRONIC COUGH: ICD-10-CM

## 2024-01-12 PROCEDURE — G8417 CALC BMI ABV UP PARAM F/U: HCPCS

## 2024-01-12 PROCEDURE — 1036F TOBACCO NON-USER: CPT

## 2024-01-12 PROCEDURE — 3078F DIAST BP <80 MM HG: CPT

## 2024-01-12 PROCEDURE — 1123F ACP DISCUSS/DSCN MKR DOCD: CPT

## 2024-01-12 PROCEDURE — 99213 OFFICE O/P EST LOW 20 MIN: CPT

## 2024-01-12 PROCEDURE — G8427 DOCREV CUR MEDS BY ELIG CLIN: HCPCS

## 2024-01-12 PROCEDURE — 3074F SYST BP LT 130 MM HG: CPT

## 2024-01-12 PROCEDURE — G8484 FLU IMMUNIZE NO ADMIN: HCPCS

## 2024-01-12 RX ORDER — ALBUTEROL SULFATE 90 UG/1
2 AEROSOL, METERED RESPIRATORY (INHALATION) EVERY 4 HOURS PRN
Qty: 1 EACH | Refills: 6 | Status: SHIPPED | OUTPATIENT
Start: 2024-01-12

## 2024-01-12 RX ORDER — PREDNISONE 50 MG/1
50 TABLET ORAL DAILY
Qty: 5 TABLET | Refills: 0 | Status: SHIPPED | OUTPATIENT
Start: 2024-01-12 | End: 2024-01-17

## 2024-01-12 RX ORDER — ASPIRIN 325 MG
325 TABLET ORAL DAILY
COMMUNITY

## 2024-01-12 ASSESSMENT — PATIENT HEALTH QUESTIONNAIRE - PHQ9
3. TROUBLE FALLING OR STAYING ASLEEP: 0
7. TROUBLE CONCENTRATING ON THINGS, SUCH AS READING THE NEWSPAPER OR WATCHING TELEVISION: 0
SUM OF ALL RESPONSES TO PHQ QUESTIONS 1-9: 0
4. FEELING TIRED OR HAVING LITTLE ENERGY: 0
SUM OF ALL RESPONSES TO PHQ QUESTIONS 1-9: 0
SUM OF ALL RESPONSES TO PHQ9 QUESTIONS 1 & 2: 0
8. MOVING OR SPEAKING SO SLOWLY THAT OTHER PEOPLE COULD HAVE NOTICED. OR THE OPPOSITE, BEING SO FIGETY OR RESTLESS THAT YOU HAVE BEEN MOVING AROUND A LOT MORE THAN USUAL: 0
SUM OF ALL RESPONSES TO PHQ QUESTIONS 1-9: 0
6. FEELING BAD ABOUT YOURSELF - OR THAT YOU ARE A FAILURE OR HAVE LET YOURSELF OR YOUR FAMILY DOWN: 0
SUM OF ALL RESPONSES TO PHQ QUESTIONS 1-9: 0
5. POOR APPETITE OR OVEREATING: 0
10. IF YOU CHECKED OFF ANY PROBLEMS, HOW DIFFICULT HAVE THESE PROBLEMS MADE IT FOR YOU TO DO YOUR WORK, TAKE CARE OF THINGS AT HOME, OR GET ALONG WITH OTHER PEOPLE: 0
9. THOUGHTS THAT YOU WOULD BE BETTER OFF DEAD, OR OF HURTING YOURSELF: 0
2. FEELING DOWN, DEPRESSED OR HOPELESS: 0
1. LITTLE INTEREST OR PLEASURE IN DOING THINGS: 0

## 2024-01-12 NOTE — PROGRESS NOTES
[facility]  [facility]  [address] [place] [address]  Dept: [phone]  Dept Fax: [phone]    Office Progress/Follow Up Note  Date ofpatient's visit: 1/12/2024  Patient's Name:  Jean Merchant YOB: 1940            Patient Care Team:  [facility] Rob[facility]  ================================================================    REASON FOR VISIT/CHIEF COMPLAINT:  Cough (Productive cough since November/Brining up phlegm /Wheezing-keeps pt up at night/)    HISTORY OF PRESENTING ILLNESS:  History was obtained from: patient, spouse. Jean Lopez a 83 y.o. is here for a acute care visit.  Patient complains of chronic cough that is been going on since Thanksgiving.  Patient states that his cough is productive but unable to tell me the color of the phlegm.  Patient states he has been taking albuterol inhaler that was prescribed to him after an urgent care visit on 11/27/2023.  Chest x-ray done at that time showing right middle lobe disease concerning for pneumonia versus atelectasis.  Patient was endorsing fevers around the time going to urgent care suggesting that this disease process is likely pneumonia.  Patient was prescribed amoxicillin and prednisone in which she completed the course.  During that time patient stated that he had improvement in his symptoms but they have since returned.  Patient also endorsed the cough was worse at night when lying flat as well as after meals.  This was concerning for possible aspiration.  Per patient's wife, patient is confused about medications.  I did explain to patient how to use inhalers and to use them as they were prescribed.      Patient Active Problem List   Diagnosis    Osteoarthritis of knees    Benign prostatic hyperplasia with nocturia    Essential hypertension    Mild intermittent asthma without complication    Insomnia       Health Maintenance

## 2024-01-12 NOTE — PROGRESS NOTES
Attending Physician Statement  I have discussed the care of Jean HOOD Ralphak, including pertinent history and exam findings with the resident. I have reviewed the key elements of all parts of the encounter with the resident.I agree with the assessment, and status of the problem list as documented. The plan and orders should include   Orders Placed This Encounter   Procedures    XR CHEST STANDARD (2 VW)    [facility]    and this was also documented by the resident.  .     Diagnosis Orders   1. Mild persistent asthma with exacerbation  predniSONE (DELTASONE) 50 MG tablet      2. Intermittent asthma, well controlled  albuterol sulfate HFA (PROAIR HFA) 108 (90 Base) MCG/ACT inhaler      3. Chronic cough  XR CHEST STANDARD (2 VW)    [facility] Speech [facility]           Ivelisse Jamil MD   Attending Physician, [facility]   Faculty, Internal Medicine Residency Program  [facility] Physician [facility]

## 2024-01-16 ENCOUNTER — HOSPITAL ENCOUNTER (OUTPATIENT)
Dept: GENERAL RADIOLOGY | Age: 84
Discharge: HOME | End: 2024-01-18
Payer: MEDICARE

## 2024-01-16 ENCOUNTER — HOSPITAL ENCOUNTER (OUTPATIENT)
Age: 84
Discharge: HOME | End: 2024-01-18
Payer: MEDICARE

## 2024-01-16 DIAGNOSIS — R05.3 CHRONIC COUGH: ICD-10-CM

## 2024-01-16 PROCEDURE — 71046 X-RAY EXAM CHEST 2 VIEWS: CPT

## 2024-01-17 DIAGNOSIS — J98.11 ATELECTASIS OF RIGHT LUNG: Primary | ICD-10-CM

## 2024-01-17 NOTE — RESULT ENCOUNTER NOTE
Concern for atelectasis of right middle lobe. CT chest ordered to rule out endobronchial lesion. Please inform patient

## 2024-01-19 ENCOUNTER — TELEPHONE (OUTPATIENT)
Dept: INTERNAL MEDICINE | Age: 84
End: 2024-01-19

## 2024-01-19 NOTE — TELEPHONE ENCOUNTER
Request for prednisone  Requested Prescriptions      No prescriptions requested or ordered in this encounter    .      Please review and e-scribe to pharmacy listed in chart if appropriate. Thank you.      Last Visit Date: 1/12/2024  Next Visit Date: Visit date not found    Future Appointments   Date Time Provider Department Center   1/25/2024  2:30 PM River Patel MD Resp Spec [facility]   1/26/2024  2:30 PM STA CT SCAN RM 2 [facility] CT SCAN [facility] Radiolog       Health Maintenance   Topic Date Due    DTaP/Tdap/Td vaccine (1 - Tdap) Never done    Shingles vaccine (1 of 2) Never done    Respiratory Syncytial Virus (RSV) Pregnant or age 60 yrs+ (1 - 1-dose 60+ series) Never done    Annual Wellness Visit (Medicare)  11/27/2023    Depression Screen  01/12/2025    Flu vaccine  Completed    Pneumococcal 65+ years Vaccine  Completed    COVID-19 Vaccine  Completed    Hepatitis A vaccine  Aged Out    Hepatitis B vaccine  Aged Out    Hib vaccine  Aged Out    Polio vaccine  Aged Out    Meningococcal (ACWY) vaccine  Aged Out       No results found for: \"LABA1C\"          ( goal A1C is < 7)   No components found for: \"LABMICR\"  LDL Calculated (mg/dL)   Date Value   11/21/2016 115       (goal LDL is <100)   AST (U/L)   Date Value   03/14/2023 21     ALT (U/L)   Date Value   03/14/2023 17     BUN (mg/dL)   Date Value   03/14/2023 20     BP Readings from Last 3 Encounters:   01/12/24 110/73   08/22/23 122/67   07/20/23 110/65          (goal 120/80)    All Future Testing planned in [facility]  Lab Frequency Next Occurrence   PSA, Diagnostic Once 09/23/2023   CT CHEST W CONTRAST Once 01/17/2024         Patient Active Problem List:     Osteoarthritis of knees     Benign prostatic hyperplasia with nocturia     Essential hypertension     Mild intermittent asthma without complication     Insomnia

## 2024-01-25 ENCOUNTER — OFFICE VISIT (OUTPATIENT)
Dept: PULMONOLOGY | Age: 84
End: 2024-01-25

## 2024-01-25 VITALS
DIASTOLIC BLOOD PRESSURE: 80 MMHG | BODY MASS INDEX: 24.32 KG/M2 | WEIGHT: 146 LBS | HEIGHT: 65 IN | HEART RATE: 69 BPM | TEMPERATURE: 97.2 F | OXYGEN SATURATION: 100 % | RESPIRATION RATE: 18 BRPM | SYSTOLIC BLOOD PRESSURE: 123 MMHG

## 2024-01-25 DIAGNOSIS — R91.8 RIGHT MIDDLE LOBE PULMONARY INFILTRATE: Primary | ICD-10-CM

## 2024-01-25 DIAGNOSIS — R09.82 POST-NASAL DRIP: ICD-10-CM

## 2024-01-25 DIAGNOSIS — J45.20 INTERMITTENT ASTHMA, WELL CONTROLLED: ICD-10-CM

## 2024-01-25 DIAGNOSIS — I10 ESSENTIAL HYPERTENSION: ICD-10-CM

## 2024-01-25 DIAGNOSIS — J45.20 MILD INTERMITTENT ASTHMA WITHOUT COMPLICATION: ICD-10-CM

## 2024-01-25 RX ORDER — MONTELUKAST SODIUM 10 MG/1
10 TABLET ORAL DAILY
Qty: 30 TABLET | Refills: 3 | Status: SHIPPED | OUTPATIENT
Start: 2024-01-25

## 2024-01-25 RX ORDER — ALBUTEROL SULFATE 90 UG/1
2 AEROSOL, METERED RESPIRATORY (INHALATION) EVERY 4 HOURS PRN
Qty: 1 EACH | Refills: 6 | Status: SHIPPED | OUTPATIENT
Start: 2024-01-25

## 2024-01-25 NOTE — PROGRESS NOTES
[facility], [facility].  [address] [place] [address]  [address]  [facility] [address]  Dept: [phone]  Dept Fax: [phone]      1/25/24    Patient: Jean Pérez  YOB: 1940    Dear Carlos Robins MD,    I had the pleasure of seeing one of your patients, JEAN PÉREZ today in the office today.  Please find attached my note with the assessment and plan of care.  Thank you for allowing me to participate in the care of this patient.  I will keep you updated on this patient's follow up and I look forward to serving you and your patients again in the future.    River Patel MD  1/25/2024 2:40 PM   
for lung cancer screening.  We'll see the patient back in 3 months or earlier if needed, based on the findings of the CT scan of the chest.  Patient will call us if he is sick, so he can be seen sooner.  Thank you for having us involved in the care of your patient. Please call us if you have any questions or concerns.              River Patel MD MD  1/25/2024 3:02 PM

## 2024-01-26 ENCOUNTER — HOSPITAL ENCOUNTER (OUTPATIENT)
Dept: CT IMAGING | Age: 84
End: 2024-01-26
Payer: MEDICARE

## 2024-01-26 DIAGNOSIS — J98.11 ATELECTASIS OF RIGHT LUNG: ICD-10-CM

## 2024-01-26 LAB
CREAT SERPL-MCNC: 0.8 MG/DL (ref 0.7–1.2)
GFR SERPL CREATININE-BSD FRML MDRD: >60 ML/MIN/1.73M2

## 2024-01-26 PROCEDURE — 36415 COLL VENOUS BLD VENIPUNCTURE: CPT

## 2024-01-26 PROCEDURE — 82565 ASSAY OF CREATININE: CPT

## 2024-01-26 PROCEDURE — 6360000004 HC RX CONTRAST MEDICATION

## 2024-01-26 PROCEDURE — 71260 CT THORAX DX C+: CPT

## 2024-01-26 PROCEDURE — 2580000003 HC RX 258

## 2024-01-26 RX ORDER — SODIUM CHLORIDE 0.9 % (FLUSH) 0.9 %
10 SYRINGE (ML) INJECTION PRN
Status: DISCONTINUED | OUTPATIENT
Start: 2024-01-26 | End: 2024-01-29 | Stop reason: HOSPADM

## 2024-01-26 RX ORDER — 0.9 % SODIUM CHLORIDE 0.9 %
80 INTRAVENOUS SOLUTION INTRAVENOUS ONCE
Status: COMPLETED | OUTPATIENT
Start: 2024-01-26 | End: 2024-01-26

## 2024-01-26 RX ADMIN — IOPAMIDOL 75 ML: 755 INJECTION, SOLUTION INTRAVENOUS at 14:28

## 2024-01-26 RX ADMIN — SODIUM CHLORIDE 80 ML: 9 INJECTION, SOLUTION INTRAVENOUS at 14:29

## 2024-01-26 RX ADMIN — SODIUM CHLORIDE, PRESERVATIVE FREE 10 ML: 5 INJECTION INTRAVENOUS at 14:29

## 2024-01-28 NOTE — RESULT ENCOUNTER NOTE
CT chest reviewed. Concern for right middle lobe atelectasis, no endobronchial lesion. Please inform patient. Encourage him to continue his daily exercise, but start deep breathing during sessions and commercial breaks if watching TV or reading a book,  to help open-up his lungs.   Will also order incentive spirometry to use.

## 2024-01-30 ENCOUNTER — TELEPHONE (OUTPATIENT)
Dept: PULMONOLOGY | Age: 84
End: 2024-01-30

## 2024-01-30 NOTE — TELEPHONE ENCOUNTER
Patient called requesting the results from his CT and blood work.  I noticed neither were ordered by us but patient was a new patient to us 1/25/24 and Amanda stated in his dictation he would review CT.  Nothing on the blood work though.  Please advise

## 2024-01-31 NOTE — TELEPHONE ENCOUNTER
Spoke with patient and got appt scheduled.  Patient now is saying that he would like something called into his pharmacy due to having a lot a mucus ad patient is having a hard time getting it up.  I verified patients pharmacy.  Please advise

## 2024-02-01 ENCOUNTER — TELEPHONE (OUTPATIENT)
Dept: INTERNAL MEDICINE | Age: 84
End: 2024-02-01

## 2024-02-01 RX ORDER — PREDNISONE 10 MG/1
40 TABLET ORAL DAILY
Qty: 20 TABLET | Refills: 0 | Status: SHIPPED | OUTPATIENT
Start: 2024-02-01 | End: 2024-02-06

## 2024-02-01 RX ORDER — GUAIFENESIN 600 MG/1
600 TABLET, EXTENDED RELEASE ORAL 2 TIMES DAILY
Qty: 14 TABLET | Refills: 0 | Status: SHIPPED | OUTPATIENT
Start: 2024-02-01 | End: 2024-02-08

## 2024-02-01 NOTE — TELEPHONE ENCOUNTER
----- Message from Ivelisse Jamil MD sent at 1/30/2024 10:39 AM EST -----  Regarding: RE: VFSS/MBSS referral  Speech therapist recommended barium swallow study  I ordered it. Please let patient know    ----- Message -----  From: Neris Felipe SLP  Sent: 1/30/2024  10:07 AM EST  To: Ivelisse Jamil MD  Subject: VFSS/MBSS referral                               Good morning Dr. Jamil,     I hope this messages finds you well. I received a speech therapy referral for Mr. Merchant with an associated diagnosis of chronic cough. I believe the patient was referred to a bedside swallow evaluation. Prior to scheduling patient for outpatient skilled speech therapy, I recommend the patient completes a videofluoroscopic swallow study (VFSS) or a MBSS first in order to assess the efficiency of his/her swallow function, assess for aspiration, and to make recommendations regarding safe dietary consistencies, effective compensatory strategies, and safe eating environment. Once that's completed, I can schedule the patient for outpatient skilled speech therapy!     Thank you and please let me know if I can be of assistance!     Kind regards,  Neris Felipe M.A., CCC-SLP   Outpatient skilled speech therapy  [facility]

## 2024-02-01 NOTE — TELEPHONE ENCOUNTER
Message routed to Neris Felipe to notify of order.    PC to pt to notify and provide phone number to schedule.

## 2024-02-08 ENCOUNTER — OFFICE VISIT (OUTPATIENT)
Dept: PULMONOLOGY | Age: 84
End: 2024-02-08
Payer: MEDICARE

## 2024-02-08 ENCOUNTER — TELEPHONE (OUTPATIENT)
Dept: PULMONOLOGY | Age: 84
End: 2024-02-08

## 2024-02-08 VITALS
DIASTOLIC BLOOD PRESSURE: 64 MMHG | RESPIRATION RATE: 16 BRPM | WEIGHT: 149 LBS | SYSTOLIC BLOOD PRESSURE: 118 MMHG | OXYGEN SATURATION: 98 % | HEART RATE: 69 BPM | BODY MASS INDEX: 24.83 KG/M2 | HEIGHT: 65 IN

## 2024-02-08 DIAGNOSIS — I10 ESSENTIAL HYPERTENSION: ICD-10-CM

## 2024-02-08 DIAGNOSIS — J47.9 BRONCHIECTASIS WITHOUT COMPLICATION (HCC): ICD-10-CM

## 2024-02-08 DIAGNOSIS — R09.82 POST-NASAL DRIP: ICD-10-CM

## 2024-02-08 DIAGNOSIS — J45.20 MILD INTERMITTENT ASTHMA WITHOUT COMPLICATION: ICD-10-CM

## 2024-02-08 DIAGNOSIS — J45.20 INTERMITTENT ASTHMA, WELL CONTROLLED: ICD-10-CM

## 2024-02-08 DIAGNOSIS — R91.8 RIGHT MIDDLE LOBE PULMONARY INFILTRATE: Primary | ICD-10-CM

## 2024-02-08 PROCEDURE — 99213 OFFICE O/P EST LOW 20 MIN: CPT | Performed by: INTERNAL MEDICINE

## 2024-02-08 PROCEDURE — G8427 DOCREV CUR MEDS BY ELIG CLIN: HCPCS | Performed by: INTERNAL MEDICINE

## 2024-02-08 PROCEDURE — 1123F ACP DISCUSS/DSCN MKR DOCD: CPT | Performed by: INTERNAL MEDICINE

## 2024-02-08 PROCEDURE — G8420 CALC BMI NORM PARAMETERS: HCPCS | Performed by: INTERNAL MEDICINE

## 2024-02-08 PROCEDURE — G8484 FLU IMMUNIZE NO ADMIN: HCPCS | Performed by: INTERNAL MEDICINE

## 2024-02-08 PROCEDURE — 1036F TOBACCO NON-USER: CPT | Performed by: INTERNAL MEDICINE

## 2024-02-08 PROCEDURE — 3078F DIAST BP <80 MM HG: CPT | Performed by: INTERNAL MEDICINE

## 2024-02-08 PROCEDURE — 3074F SYST BP LT 130 MM HG: CPT | Performed by: INTERNAL MEDICINE

## 2024-02-08 NOTE — TELEPHONE ENCOUNTER
Writer spoke with patient and informed him of Dr Patel's message. Patient voiced understanding and is scheduled for follow up with Dr Patel today (2/8/24).   [facility] [phone] [facility] [phone] [facility] [phone]

## 2024-02-08 NOTE — TELEPHONE ENCOUNTER
----- Message from River Patel MD sent at 2/8/2024  7:04 AM EST -----   Please inform the patient that the CT scan of the chest done on January 27 did not show any blockage of the right middle lobe.  Please have him follow-up with me as scheduled.  Thank you

## 2024-02-08 NOTE — PROGRESS NOTES
PULMONARY outpatient progress note        REFERRED BY: Carlos Robins MD    REASON FOR CONSULTATION: Asthma, postnasal discharge, right middle lobe infiltrate    Patient is being seen in follow-up for-  1. Right middle lobe pulmonary infiltrate    2. Mild intermittent asthma without complication    3. Post-nasal drip    4. Essential hypertension    5. Intermittent asthma, well controlled    6. Bronchiectasis without complication (HCC)          HISTORY OF PRESENT ILLNESS:    Jean Merchant is a 83 y.o. year old male here for evaluation of above problems  Patient has episodes of asthma for which he uses albuterol as needed  No albuterol recently  Has had cough in the past 2 months that improved with prednisone burst  Evaluation by way of chest x-ray November 2023 showed a right middle lobe pneumonia  Patient not coughing up any purulent sputum now  No fever or chills or night sweats  No weight loss or loss of appetite  No shortness of breath or wheezing  No hemoptysis  No exposure to tuberculosis  No occupational exposures or hobbies or pets that would cause lung disease    Interval history:    Denied any shortness of breath or wheezing  Not much cough or any sputum production  No hemoptysis  No weight loss or loss of appetite  No orthopnea, PND or increasing pedal edema  Not much postnasal discharge  No nighttime symptoms of bronchial asthma  No early morning sputum or multicolored sputum      PAST MEDICAL HISTORY:       Diagnosis Date    Asthma     Hypertension     Nonsmoker 11/05/2011    Osteoarthritis     knees    Right rotator cuff tear     Right rotator cuff tear     Sinusitis, chronic     Sinusitis, chronic 11/05/2011    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

## 2024-02-19 ENCOUNTER — HOSPITAL ENCOUNTER (OUTPATIENT)
Dept: GENERAL RADIOLOGY | Age: 84
Discharge: HOME | End: 2024-02-21
Attending: STUDENT IN AN ORGANIZED HEALTH CARE EDUCATION/TRAINING PROGRAM
Payer: MEDICARE

## 2024-02-19 DIAGNOSIS — T17.908A ASPIRATION INTO AIRWAY, INITIAL ENCOUNTER: ICD-10-CM

## 2024-02-19 PROCEDURE — 92611 MOTION FLUOROSCOPY/SWALLOW: CPT

## 2024-02-19 PROCEDURE — 74230 X-RAY XM SWLNG FUNCJ C+: CPT

## 2024-02-19 NOTE — PROCEDURES
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  Past Surgical History:  has a past surgical history that includes joint replacement (Bilateral).             Type of Study: Initial MBS       Recent CXR/CT of Chest:   IMPRESSION:  1. Right middle lobe atelectasis. No clear endobronchial lesion or central  obstructing involvement clearly evident however attention on follow-up.  2. Cardiomegaly.  3. Small hiatal hernia.    Patient Complaints/Reason for Referral:  Jean Merchant was referred for a MBS to assess the efficiency of his/her swallow function, assess for aspiration, and to make recommendations regarding safe dietary consistencies, effective compensatory strategies, and safe eating environment.       Onset of problem: Pt with history of cough and pneumonia. Pt reports he has been feeling better. Pt does not report difficulty with eating/drinking.            Behavior/Cognition/Vision/Hearing:  Behavior/Cognition: Alert  Vision: Impaired  Vision Exceptions: Wears glasses at all times  Hearing: Within functional limits    Impressions:  Patient presents with safe swallow for Regular diet with thin liquids as evidenced by no aspiration with consistencies tested. Pt with +flash penetration, with thin liquid (with and without chin tuck).  Penetration cleared, no aspiration. +premature spill with all consistencies. Recommend small sips and bites, slow rate, upright at 90 degrees for all PO intake.  Recommend close monitoring for overt/clinical s/s of aspiration and D/C PO intake and complete

## 2024-04-08 ENCOUNTER — TELEPHONE (OUTPATIENT)
Dept: INTERNAL MEDICINE | Age: 84
End: 2024-04-08

## 2024-04-08 DIAGNOSIS — F40.240 CLAUSTROPHOBIA: Primary | ICD-10-CM

## 2024-04-08 DIAGNOSIS — F40.243 ANXIETY WITH FLYING: ICD-10-CM

## 2024-04-08 NOTE — TELEPHONE ENCOUNTER
Received PC from pt stating he is flying out to [place] next week and has claustrophobia. Pt is requesting medication to help calm anxiety r/t flying and small spaces. Pt states he has received medication for this in the past, but that it has been some time. Review of chart shows pt received script for lorazepam 0.5 mg tab q 8 hours for anxiety in 2019.    Please consider short-fill of lorazepam for pt to utilize while flying.

## 2024-04-09 RX ORDER — LORAZEPAM 0.5 MG/1
0.5 TABLET ORAL EVERY 8 HOURS PRN
Qty: 10 TABLET | Refills: 0 | Status: SHIPPED | OUTPATIENT
Start: 2024-04-09 | End: 2024-05-09

## 2024-04-11 NOTE — TELEPHONE ENCOUNTER
Received VM from pt stating he went to pharmacy yesterday and it was not there.    Review of chart shows script was \"printed\" while PCP at hospital.    PC to pharmacy, able to provide verbal authorization for lorazepam 0.5 mg tabs, dispense 10 no refills, take one tab every 8 hours PRN.    PC back to pt to let him know to check fill status before heading to pharmacy to pick it up.

## 2024-05-20 RX ORDER — MONTELUKAST SODIUM 10 MG/1
10 TABLET ORAL DAILY
Qty: 30 TABLET | Refills: 3 | Status: SHIPPED | OUTPATIENT
Start: 2024-05-20

## 2024-05-20 NOTE — TELEPHONE ENCOUNTER
Last Visit: 2/8/24  Next Visit: 8/8/24    Patient requesting refill for Singulair. Please see pended script and advise.

## 2024-06-03 DIAGNOSIS — N40.1 BENIGN PROSTATIC HYPERPLASIA WITH NOCTURIA: ICD-10-CM

## 2024-06-03 DIAGNOSIS — R35.1 BENIGN PROSTATIC HYPERPLASIA WITH NOCTURIA: ICD-10-CM

## 2024-06-03 NOTE — TELEPHONE ENCOUNTER
..Request for   Requested Prescriptions     Pending Prescriptions Disp Refills    tamsulosin (FLOMAX) 0.4 MG capsule [Pharmacy Med Name: TAMSULOSIN HCL 0.4 MG CAPSULE] 90 capsule 3     Sig: TAKE ONE CAPSULE BY MOUTH DAILY    .      Please review and e-scribe to pharmacy listed in chart if appropriate. Thank you.      Last Visit Date: 1/12/2024  Next Visit Date: Visit date not found    Future Appointments   Date Time Provider Department Center   8/8/2024  3:15 PM River Patel MD Resp Spec [id number]       Health Maintenance   Topic Date Due    DTaP/Tdap/Td vaccine (1 - Tdap) Never done    Shingles vaccine (1 of 2) Never done    Respiratory Syncytial Virus (RSV) Pregnant or age 60 yrs+ (1 - 1-dose 60+ series) Never done    Annual Wellness Visit (Medicare)  11/27/2023    Depression Screen  01/12/2025    Flu vaccine  Completed    Pneumococcal 65+ years Vaccine  Completed    COVID-19 Vaccine  Completed    Hepatitis A vaccine  Aged Out    Hepatitis B vaccine  Aged Out    Hib vaccine  Aged Out    Polio vaccine  Aged Out    Meningococcal (ACWY) vaccine  Aged Out       No results found for: \"LABA1C\"          ( goal A1C is < 7)   No components found for: \"LABMICR\"  No components found for: \"LDLCHOLESTEROL\", \"LDLCALC\"    (goal LDL is <100)   AST (U/L)   Date Value   03/14/2023 21     ALT (U/L)   Date Value   03/14/2023 17     BUN (mg/dL)   Date Value   03/14/2023 20     BP Readings from Last 3 Encounters:   02/08/24 118/64   01/25/24 123/80   01/12/24 110/73          (goal 120/80)    All Future Testing planned in [facility]  Lab Frequency Next Occurrence         Patient Active Problem List:     Osteoarthritis of knees     Benign prostatic hyperplasia with nocturia     Essential hypertension     Mild intermittent asthma without complication     Insomnia

## 2024-06-04 RX ORDER — TAMSULOSIN HYDROCHLORIDE 0.4 MG/1
CAPSULE ORAL
Qty: 90 CAPSULE | Refills: 3 | Status: SHIPPED | OUTPATIENT
Start: 2024-06-04

## 2024-07-22 NOTE — TELEPHONE ENCOUNTER
Jean Merchant is calling to request a refill on the following medication(s):    Medication Request:  Requested Prescriptions     Pending Prescriptions Disp Refills    LORazepam (ATIVAN) 0.5 MG tablet [Pharmacy Med Name: LORazepam 0.5 MG TABLET] 10 tablet      Sig: TAKE ONE TABLET BY MOUTH EVERY 8 HOURS AS NEEDED FOR UP TO 30 DAYS *MAX 3 TABLETS PER DAY*       Last Visit Date (If Applicable):  1/12/2024    Next Visit Date:    Visit date not found

## 2024-07-30 ENCOUNTER — OFFICE VISIT (OUTPATIENT)
Dept: INTERNAL MEDICINE | Age: 84
End: 2024-07-30
Payer: MEDICARE

## 2024-07-30 ENCOUNTER — TELEPHONE (OUTPATIENT)
Dept: INTERNAL MEDICINE | Age: 84
End: 2024-07-30

## 2024-07-30 VITALS
WEIGHT: 152 LBS | BODY MASS INDEX: 25.95 KG/M2 | OXYGEN SATURATION: 95 % | SYSTOLIC BLOOD PRESSURE: 139 MMHG | HEIGHT: 64 IN | DIASTOLIC BLOOD PRESSURE: 84 MMHG | HEART RATE: 69 BPM

## 2024-07-30 DIAGNOSIS — R09.81 NASAL CONGESTION: Primary | ICD-10-CM

## 2024-07-30 DIAGNOSIS — J18.9 PNEUMONIA OF RIGHT LOWER LOBE DUE TO INFECTIOUS ORGANISM: Primary | ICD-10-CM

## 2024-07-30 PROCEDURE — 3075F SYST BP GE 130 - 139MM HG: CPT

## 2024-07-30 PROCEDURE — 99213 OFFICE O/P EST LOW 20 MIN: CPT

## 2024-07-30 PROCEDURE — G8417 CALC BMI ABV UP PARAM F/U: HCPCS

## 2024-07-30 PROCEDURE — 99211 OFF/OP EST MAY X REQ PHY/QHP: CPT | Performed by: INTERNAL MEDICINE

## 2024-07-30 PROCEDURE — 1036F TOBACCO NON-USER: CPT

## 2024-07-30 PROCEDURE — 1123F ACP DISCUSS/DSCN MKR DOCD: CPT

## 2024-07-30 PROCEDURE — 3079F DIAST BP 80-89 MM HG: CPT

## 2024-07-30 PROCEDURE — G8427 DOCREV CUR MEDS BY ELIG CLIN: HCPCS

## 2024-07-30 RX ORDER — FLUTICASONE PROPIONATE 50 MCG
1 SPRAY, SUSPENSION (ML) NASAL DAILY
Qty: 16 G | Refills: 0 | Status: SHIPPED | OUTPATIENT
Start: 2024-07-30

## 2024-07-30 ASSESSMENT — PATIENT HEALTH QUESTIONNAIRE - PHQ9
SUM OF ALL RESPONSES TO PHQ9 QUESTIONS 1 & 2: 0
1. LITTLE INTEREST OR PLEASURE IN DOING THINGS: NOT AT ALL
3. TROUBLE FALLING OR STAYING ASLEEP: SEVERAL DAYS
SUM OF ALL RESPONSES TO PHQ QUESTIONS 1-9: 1
10. IF YOU CHECKED OFF ANY PROBLEMS, HOW DIFFICULT HAVE THESE PROBLEMS MADE IT FOR YOU TO DO YOUR WORK, TAKE CARE OF THINGS AT HOME, OR GET ALONG WITH OTHER PEOPLE: NOT DIFFICULT AT ALL
SUM OF ALL RESPONSES TO PHQ QUESTIONS 1-9: 1
9. THOUGHTS THAT YOU WOULD BE BETTER OFF DEAD, OR OF HURTING YOURSELF: NOT AT ALL
SUM OF ALL RESPONSES TO PHQ QUESTIONS 1-9: 1
4. FEELING TIRED OR HAVING LITTLE ENERGY: NOT AT ALL
SUM OF ALL RESPONSES TO PHQ QUESTIONS 1-9: 1
5. POOR APPETITE OR OVEREATING: NOT AT ALL
2. FEELING DOWN, DEPRESSED OR HOPELESS: NOT AT ALL
6. FEELING BAD ABOUT YOURSELF - OR THAT YOU ARE A FAILURE OR HAVE LET YOURSELF OR YOUR FAMILY DOWN: NOT AT ALL
8. MOVING OR SPEAKING SO SLOWLY THAT OTHER PEOPLE COULD HAVE NOTICED. OR THE OPPOSITE, BEING SO FIGETY OR RESTLESS THAT YOU HAVE BEEN MOVING AROUND A LOT MORE THAN USUAL: NOT AT ALL
7. TROUBLE CONCENTRATING ON THINGS, SUCH AS READING THE NEWSPAPER OR WATCHING TELEVISION: NOT AT ALL

## 2024-07-30 ASSESSMENT — ENCOUNTER SYMPTOMS
EYE ITCHING: 0
BACK PAIN: 0
WHEEZING: 0
COUGH: 0
VOICE CHANGE: 0
EYE DISCHARGE: 0
SHORTNESS OF BREATH: 0
ABDOMINAL PAIN: 0
SORE THROAT: 0
NAUSEA: 0
VOMITING: 0
RHINORRHEA: 0

## 2024-07-30 NOTE — PROGRESS NOTES
Attending Physician Statement  I have discussed the care of Jean Merchant, including pertinent history and exam findings with the resident. I have reviewed the key elements of all parts of the encounter with the resident. Went to ED for SOB, hx of bronchiectasis and follows Pulmonary. Has completed abx and steroids. Reports feeling well and at baseline with breathing at this time. I agree with the assessment, and status of the problem list as documented.   Diagnosis Orders   1. Pneumonia of right lower lobe due to infectious organism           The plan and orders should include No orders of the defined types were placed in this encounter.   and this was also documented by the resident.   Dr Awa Forman MD, FACP  Associate , Internal Medicine Residency Program  Res[facility] , [facility]  Chair, Department of Internal Medicine  [facility] [place] [facility]         7/30/2024, 11:26 AM

## 2024-07-30 NOTE — PROGRESS NOTES
[facility]  [facility]  [address]  [facility] [address]  Dept: [phone]  Dept Fax: [phone]    Office Progress/Follow Up Note  Date ofpatient's visit: 7/30/2024  Patient's Name:  Jean Merchant YOB: 1940            Patient Care Team:  Carlos Robins MD as PCP - General  Awa Forman MD as PCP - Empaneled Provider  River Patel MD as Consulting Physician (Pulmonary Disease)  ================================================================    REASON FOR VISIT/CHIEF COMPLAINT:  ED Follow-up (PNEUMONIA )    HISTORY OF PRESENTING ILLNESS:  History was obtained from: patient. Jean Lopez a 84 y.o. is here for an ED follow-up visit.    Patient reports that he went to the ER for [facility] on 7/23 after he was feeling sick.  Reported that he was having increased cough, congestion as well as wheezes.  Reported that his cough was worse whenever he tried laying down.  In the ER, chest x-ray was done, he was found to have multifocal pneumonia with right basilar lung involvement and left lower lobe involvement.  He was discharged home on amoxicillin for the pneumonia.  He has completed the antibiotic course and reports that he has been feeling much better now.  Denies any cough, shortness of breath, phlegm production or any wheezes.  He sees pulmonology for right middle lobe infiltrate as well as bronchiectasis.  On examination, he was found to have right sided rhonchi and wheeze.  We will monitor off of antibiotics for now.  Patient has his follow-up appointment on 8/8 with pulmonology.    Patient also has concerns for his dementia.  Patient has a new to provider appointment.  Discussed with the patient that he would need slums scoring to be done when he comes to visit the next time.    Patient Active Problem List   Diagnosis    Osteoarthritis of knees    Benign prostatic hyperplasia with nocturia    Essential hypertension    Mild

## 2024-07-30 NOTE — TELEPHONE ENCOUNTER
Pc said he was having nasal congestion when he had his appointment today and forgot to ask for a nasal spray

## 2024-08-06 RX ORDER — LORAZEPAM 0.5 MG/1
TABLET ORAL
Qty: 10 TABLET | OUTPATIENT
Start: 2024-08-06

## 2024-08-08 ENCOUNTER — OFFICE VISIT (OUTPATIENT)
Dept: PULMONOLOGY | Age: 84
End: 2024-08-08
Payer: MEDICARE

## 2024-08-08 VITALS
WEIGHT: 149 LBS | OXYGEN SATURATION: 92 % | BODY MASS INDEX: 24.83 KG/M2 | HEART RATE: 86 BPM | SYSTOLIC BLOOD PRESSURE: 110 MMHG | DIASTOLIC BLOOD PRESSURE: 73 MMHG | RESPIRATION RATE: 19 BRPM | HEIGHT: 65 IN

## 2024-08-08 DIAGNOSIS — R91.8 RIGHT MIDDLE LOBE PULMONARY INFILTRATE: Primary | ICD-10-CM

## 2024-08-08 DIAGNOSIS — J45.40 MODERATE PERSISTENT ASTHMA WITHOUT COMPLICATION: ICD-10-CM

## 2024-08-08 DIAGNOSIS — R09.82 POST-NASAL DRIP: ICD-10-CM

## 2024-08-08 DIAGNOSIS — J47.9 BRONCHIECTASIS WITHOUT COMPLICATION (HCC): ICD-10-CM

## 2024-08-08 DIAGNOSIS — I10 ESSENTIAL HYPERTENSION: ICD-10-CM

## 2024-08-08 DIAGNOSIS — J45.20 INTERMITTENT ASTHMA, WELL CONTROLLED: ICD-10-CM

## 2024-08-08 PROCEDURE — G8420 CALC BMI NORM PARAMETERS: HCPCS | Performed by: INTERNAL MEDICINE

## 2024-08-08 PROCEDURE — 3078F DIAST BP <80 MM HG: CPT | Performed by: INTERNAL MEDICINE

## 2024-08-08 PROCEDURE — 1036F TOBACCO NON-USER: CPT | Performed by: INTERNAL MEDICINE

## 2024-08-08 PROCEDURE — G8427 DOCREV CUR MEDS BY ELIG CLIN: HCPCS | Performed by: INTERNAL MEDICINE

## 2024-08-08 PROCEDURE — 3074F SYST BP LT 130 MM HG: CPT | Performed by: INTERNAL MEDICINE

## 2024-08-08 PROCEDURE — 99214 OFFICE O/P EST MOD 30 MIN: CPT | Performed by: INTERNAL MEDICINE

## 2024-08-08 PROCEDURE — 1123F ACP DISCUSS/DSCN MKR DOCD: CPT | Performed by: INTERNAL MEDICINE

## 2024-08-08 RX ORDER — FLUTICASONE FUROATE AND VILANTEROL 200; 25 UG/1; UG/1
1 POWDER RESPIRATORY (INHALATION) DAILY
Qty: 1 EACH | Refills: 5 | Status: SHIPPED | OUTPATIENT
Start: 2024-08-08 | End: 2024-08-13

## 2024-08-08 NOTE — PROGRESS NOTES
PULMONARY outpatient progress note        REFERRED BY: Carlos Robins MD    REASON FOR CONSULTATION: Asthma, postnasal discharge, right middle lobe infiltrate    Patient is being seen in follow-up for-  1. Right middle lobe pulmonary infiltrate    2. Post-nasal drip    3. Essential hypertension    4. Intermittent asthma, well controlled    5. Bronchiectasis without complication (HCC)    6. Moderate persistent asthma without complication          HISTORY OF PRESENT ILLNESS:    Jean Merchant is a 84 y.o. year old male here for evaluation of above problems  Patient has episodes of asthma for which he uses albuterol as needed  No albuterol recently  Has had cough in the past 2 months that improved with prednisone burst  Evaluation by way of chest x-ray November 2023 showed a right middle lobe pneumonia  Patient not coughing up any purulent sputum now  No fever or chills or night sweats  No weight loss or loss of appetite  No shortness of breath or wheezing  No hemoptysis  No exposure to tuberculosis  No occupational exposures or hobbies or pets that would cause lung disease    Interval history:    Denied any shortness of breath  Has been having cough with yellow-colored sputum  Was evaluated the emergency room recently and was prescribed amoxicillin  Patient continues to have some wheezing  Claims albuterol is helpful  No hemoptysis  No weight loss or loss of appetite  No orthopnea, PND or increasing pedal edema  Not much postnasal discharge  No nighttime symptoms of bronchial asthma      PAST MEDICAL HISTORY:       Diagnosis Date    Asthma     Hypertension     Nonsmoker 11/05/2011    Osteoarthritis     knees    Pneumonia     July    Right rotator cuff tear     Right rotator cuff tear     Sinusitis, chronic     Sinusitis, chronic 11/05/2011    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

## 2024-08-09 ENCOUNTER — HOSPITAL ENCOUNTER (OUTPATIENT)
Age: 84
Setting detail: SPECIMEN
Discharge: HOME | End: 2024-08-09
Payer: MEDICARE

## 2024-08-09 DIAGNOSIS — J47.9 BRONCHIECTASIS WITHOUT COMPLICATION (HCC): ICD-10-CM

## 2024-08-09 LAB
MICROORGANISM/AGENT SPEC: ABNORMAL
SPECIMEN DESCRIPTION: ABNORMAL

## 2024-08-09 PROCEDURE — 87070 CULTURE OTHR SPECIMN AEROBIC: CPT

## 2024-08-09 PROCEDURE — 87205 SMEAR GRAM STAIN: CPT

## 2024-08-12 ENCOUNTER — HOSPITAL ENCOUNTER (OUTPATIENT)
Age: 84
Setting detail: SPECIMEN
Discharge: HOME | End: 2024-08-12
Payer: MEDICARE

## 2024-08-12 PROCEDURE — 87070 CULTURE OTHR SPECIMN AEROBIC: CPT

## 2024-08-12 PROCEDURE — 87205 SMEAR GRAM STAIN: CPT

## 2024-08-13 ENCOUNTER — TELEPHONE (OUTPATIENT)
Dept: PULMONOLOGY | Age: 84
End: 2024-08-13

## 2024-08-13 LAB
MICROORGANISM SPEC CULT: NORMAL
MICROORGANISM/AGENT SPEC: NORMAL
SPECIMEN DESCRIPTION: NORMAL

## 2024-08-13 RX ORDER — FLUTICASONE PROPIONATE AND SALMETEROL 250; 50 UG/1; UG/1
1 POWDER RESPIRATORY (INHALATION) EVERY 12 HOURS
Qty: 60 EACH | Refills: 5 | Status: SHIPPED | OUTPATIENT
Start: 2024-08-13

## 2024-08-13 NOTE — TELEPHONE ENCOUNTER
Patient stated his Breo will cost him almost $300 a month and this is too expensive for him.     Writer contacted [facility] to see what other drugs in the same class would cost less for patient. Per Manuela at [facility] Breo is a tier three drug and will cost patient 16% of the cost of the medication once his deductible is reached. Wixela will cost patient $7.00 a month once deductible is met. Writer spoke with patient and informed him of this. Patient would like to try Wixela. Script for Breo was cancelled.     Dr Patel, please send script for Wixela should you agree to [facility]. Writer did not pend script as was unsure on dose you would like. Thank you.

## 2024-09-16 RX ORDER — MONTELUKAST SODIUM 10 MG/1
10 TABLET ORAL DAILY
Qty: 30 TABLET | Refills: 3 | Status: SHIPPED | OUTPATIENT
Start: 2024-09-16

## 2024-11-06 DIAGNOSIS — N40.1 BENIGN PROSTATIC HYPERPLASIA WITH NOCTURIA: ICD-10-CM

## 2024-11-06 DIAGNOSIS — R35.1 BENIGN PROSTATIC HYPERPLASIA WITH NOCTURIA: ICD-10-CM

## 2024-11-06 NOTE — TELEPHONE ENCOUNTER
Jean Merchant is calling to request a refill on the following medication(s):    Medication Request:  Requested Prescriptions     Pending Prescriptions Disp Refills    finasteride (PROSCAR) 5 MG tablet 90 tablet 5     Sig: Take 1 tablet by mouth daily       Last Visit Date (If Applicable):  7/30/2024    Next Visit Date:    Visit date not found

## 2024-11-07 ENCOUNTER — OFFICE VISIT (OUTPATIENT)
Dept: PULMONOLOGY | Age: 84
End: 2024-11-07
Payer: MEDICARE

## 2024-11-07 VITALS
OXYGEN SATURATION: 98 % | HEART RATE: 62 BPM | DIASTOLIC BLOOD PRESSURE: 66 MMHG | HEIGHT: 65 IN | BODY MASS INDEX: 25.04 KG/M2 | RESPIRATION RATE: 18 BRPM | WEIGHT: 150.3 LBS | SYSTOLIC BLOOD PRESSURE: 110 MMHG

## 2024-11-07 DIAGNOSIS — J45.20 INTERMITTENT ASTHMA, WELL CONTROLLED: ICD-10-CM

## 2024-11-07 DIAGNOSIS — J47.9 BRONCHIECTASIS WITHOUT COMPLICATION (HCC): ICD-10-CM

## 2024-11-07 DIAGNOSIS — J45.40 MODERATE PERSISTENT ASTHMA WITHOUT COMPLICATION: ICD-10-CM

## 2024-11-07 DIAGNOSIS — R91.8 RIGHT MIDDLE LOBE PULMONARY INFILTRATE: Primary | ICD-10-CM

## 2024-11-07 DIAGNOSIS — R09.82 POST-NASAL DRIP: ICD-10-CM

## 2024-11-07 DIAGNOSIS — I10 ESSENTIAL HYPERTENSION: ICD-10-CM

## 2024-11-07 PROCEDURE — 3074F SYST BP LT 130 MM HG: CPT | Performed by: INTERNAL MEDICINE

## 2024-11-07 PROCEDURE — 1036F TOBACCO NON-USER: CPT | Performed by: INTERNAL MEDICINE

## 2024-11-07 PROCEDURE — 99213 OFFICE O/P EST LOW 20 MIN: CPT | Performed by: INTERNAL MEDICINE

## 2024-11-07 PROCEDURE — G8484 FLU IMMUNIZE NO ADMIN: HCPCS | Performed by: INTERNAL MEDICINE

## 2024-11-07 PROCEDURE — G8417 CALC BMI ABV UP PARAM F/U: HCPCS | Performed by: INTERNAL MEDICINE

## 2024-11-07 PROCEDURE — 1123F ACP DISCUSS/DSCN MKR DOCD: CPT | Performed by: INTERNAL MEDICINE

## 2024-11-07 PROCEDURE — 1159F MED LIST DOCD IN RCRD: CPT | Performed by: INTERNAL MEDICINE

## 2024-11-07 PROCEDURE — 3078F DIAST BP <80 MM HG: CPT | Performed by: INTERNAL MEDICINE

## 2024-11-07 PROCEDURE — G8427 DOCREV CUR MEDS BY ELIG CLIN: HCPCS | Performed by: INTERNAL MEDICINE

## 2024-11-07 RX ORDER — FINASTERIDE 5 MG/1
5 TABLET, FILM COATED ORAL DAILY
Qty: 90 TABLET | Refills: 5 | Status: SHIPPED | OUTPATIENT
Start: 2024-11-07

## 2024-11-07 NOTE — PROGRESS NOTES
68.9 kg (152 lb)       Body mass index is 25.01 kg/m².    Physical Exam  Trachea is in midline. No sinus tenderness. No jaundice. No polyps. No thrush.  No swollen glands in the neck.  Lungs are clear.  Heart sounds are normal.  Abdomen is soft and nontender.  No cyanosis or clubbing in the musculoskeletal system. No swelling of the feet.    Vital Signs  Oxygen saturation is at 98 percent.           DATA:       Results  Laboratory Studies  Sputum gram stain and culture showed no significant pus secretions or neutrophils.         IMPRESSION:   1. Right middle lobe pulmonary infiltrate    2. Post-nasal drip    3. Essential hypertension    4. Intermittent asthma, well controlled    5. Bronchiectasis without complication (HCC)    6. Moderate persistent asthma without complication                 Assessment & Plan  1. Asthma.  His sputum culture from 08/12/2024 did not indicate an infectious process. He reports no fever, chills, shortness of breath, wheezing, coughing, phlegm production, or blood in sputum. His appetite is moderate, and he has lost weight over a long period. His blood pressure and oxygen saturation are within normal range. He is currently using Wixela 1 inhalation twice a day and Singulair tablets. He was advised to continue with the current regimen of Wixela and Singulair. The option to reduce the Wixela dosage was discussed but not pursued.    2. Bronchiectasis.  There are no signs of acute infection or exacerbation. He reports no postnasal discharge, and his lungs are clear on examination. He will continue with the current treatment plan, including Wixela and Singulair.    3. Health Maintenance.  He has received his flu shot and COVID-19 vaccine. There is no contraindication for his planned cataract surgery.    Follow-up  Return in 6 months for follow-up.       Thank you for having us involved in the care of your patient. Please call us if you have any questions or concerns.  The patient (or guardian,

## 2025-01-20 RX ORDER — MONTELUKAST SODIUM 10 MG/1
10 TABLET ORAL DAILY
Qty: 30 TABLET | Refills: 3 | Status: SHIPPED | OUTPATIENT
Start: 2025-01-20

## 2025-01-20 NOTE — TELEPHONE ENCOUNTER
Last Visit: 11/7/24  Next Visit: 5/8/25    Received faxed refill request for Singulair. Per last dictation, patient is taking this medication. Please sign if agreeable.

## 2025-05-08 ENCOUNTER — OFFICE VISIT (OUTPATIENT)
Dept: PULMONOLOGY | Age: 85
End: 2025-05-08
Payer: MEDICARE

## 2025-05-08 VITALS
DIASTOLIC BLOOD PRESSURE: 75 MMHG | HEIGHT: 65 IN | OXYGEN SATURATION: 98 % | BODY MASS INDEX: 26.33 KG/M2 | SYSTOLIC BLOOD PRESSURE: 138 MMHG | RESPIRATION RATE: 18 BRPM | HEART RATE: 60 BPM | WEIGHT: 158 LBS

## 2025-05-08 DIAGNOSIS — R09.82 POST-NASAL DRIP: ICD-10-CM

## 2025-05-08 DIAGNOSIS — R91.8 RIGHT MIDDLE LOBE PULMONARY INFILTRATE: Primary | ICD-10-CM

## 2025-05-08 DIAGNOSIS — J45.20 MILD INTERMITTENT ASTHMA WITHOUT COMPLICATION: ICD-10-CM

## 2025-05-08 DIAGNOSIS — J47.9 BRONCHIECTASIS WITHOUT COMPLICATION (HCC): ICD-10-CM

## 2025-05-08 DIAGNOSIS — I10 ESSENTIAL HYPERTENSION: ICD-10-CM

## 2025-05-08 PROCEDURE — G8427 DOCREV CUR MEDS BY ELIG CLIN: HCPCS | Performed by: INTERNAL MEDICINE

## 2025-05-08 PROCEDURE — G8417 CALC BMI ABV UP PARAM F/U: HCPCS | Performed by: INTERNAL MEDICINE

## 2025-05-08 PROCEDURE — 1123F ACP DISCUSS/DSCN MKR DOCD: CPT | Performed by: INTERNAL MEDICINE

## 2025-05-08 PROCEDURE — 3075F SYST BP GE 130 - 139MM HG: CPT | Performed by: INTERNAL MEDICINE

## 2025-05-08 PROCEDURE — 1159F MED LIST DOCD IN RCRD: CPT | Performed by: INTERNAL MEDICINE

## 2025-05-08 PROCEDURE — 1036F TOBACCO NON-USER: CPT | Performed by: INTERNAL MEDICINE

## 2025-05-08 PROCEDURE — 99213 OFFICE O/P EST LOW 20 MIN: CPT | Performed by: INTERNAL MEDICINE

## 2025-05-08 PROCEDURE — 3078F DIAST BP <80 MM HG: CPT | Performed by: INTERNAL MEDICINE

## 2025-05-08 NOTE — PROGRESS NOTES
PULMONARY OP  PROGRESS NOTE      Patient:  Jean Merchant  YOB: 1940    MRN: [id number]     Acct:        Pt seen and Chart reviewed.  Jean Merchant is here in followup for   1. Right middle lobe pulmonary infiltrate    2. Bronchiectasis without complication (HCC)    3. Post-nasal drip    4. Essential hypertension    5. Mild intermittent asthma without complication          History of Present Illness  The patient is an 84-year-old male who presents for follow-up of right middle lobe pulmonary infiltrate, bronchiectasis, postnasal drip, essential hypertension, and moderate persistent asthma without complication.    He reports a general sense of well-being since his last visit, with no hospitalizations or emergency room visits due to respiratory distress. He has not experienced any systemic symptoms such as fever, chills, or night sweats. He also reports no chest pain or pressure. His appetite remains robust, and he has experienced a weight gain of approximately 10 pounds. He does not endorse any significant cough or hemoptysis. His sleep pattern is largely unperturbed, although he occasionally experiences postnasal drip. He reports no wheezing at night and does not wake up at night due to coughing or wheezing. He typically retires to bed around 2:00 or 3:00 AM. He recently traveled to [place], where he adhered to the local time zone, retiring to bed around midnight. Upon returning home, he resumed his usual routine without difficulty. He has been using albuterol sparingly and Wixela approximately once every two weeks. He takes Singulair intermittently, approximately 2 to 3 times per week.    MEDICATIONS  Current: Albuterol, Wixela, Singulair         Review of Systems -   Constitutional ROS: negative  ENT ROS: negative  Allergy and Immunology ROS: negative  Respiratory ROS: negative  Cardiovascular ROS: negative  Gastrointestinal ROS: negative  Musculoskeletal ROS: negative         Allergies:  Allergies

## 2025-08-05 ENCOUNTER — OFFICE VISIT (OUTPATIENT)
Age: 85
End: 2025-08-05
Payer: MEDICARE

## 2025-08-05 VITALS
TEMPERATURE: 97.2 F | DIASTOLIC BLOOD PRESSURE: 86 MMHG | HEIGHT: 65 IN | OXYGEN SATURATION: 96 % | SYSTOLIC BLOOD PRESSURE: 146 MMHG | RESPIRATION RATE: 18 BRPM | BODY MASS INDEX: 26.99 KG/M2 | HEART RATE: 68 BPM | WEIGHT: 162 LBS

## 2025-08-05 DIAGNOSIS — I10 ESSENTIAL HYPERTENSION: ICD-10-CM

## 2025-08-05 DIAGNOSIS — F51.01 PRIMARY INSOMNIA: ICD-10-CM

## 2025-08-05 DIAGNOSIS — J45.20 MILD INTERMITTENT ASTHMA WITHOUT COMPLICATION: Primary | ICD-10-CM

## 2025-08-05 DIAGNOSIS — M17.10 ARTHRITIS OF KNEE: ICD-10-CM

## 2025-08-05 DIAGNOSIS — N40.1 BENIGN PROSTATIC HYPERPLASIA WITH NOCTURIA: ICD-10-CM

## 2025-08-05 DIAGNOSIS — R35.1 BENIGN PROSTATIC HYPERPLASIA WITH NOCTURIA: ICD-10-CM

## 2025-08-05 DIAGNOSIS — J18.9 PNEUMONIA OF RIGHT LOWER LOBE DUE TO INFECTIOUS ORGANISM: ICD-10-CM

## 2025-08-05 PROCEDURE — 99214 OFFICE O/P EST MOD 30 MIN: CPT | Performed by: HOSPITALIST

## 2025-08-05 PROCEDURE — 1125F AMNT PAIN NOTED PAIN PRSNT: CPT | Performed by: HOSPITALIST

## 2025-08-05 PROCEDURE — G8417 CALC BMI ABV UP PARAM F/U: HCPCS | Performed by: HOSPITALIST

## 2025-08-05 PROCEDURE — 1036F TOBACCO NON-USER: CPT | Performed by: HOSPITALIST

## 2025-08-05 PROCEDURE — 1123F ACP DISCUSS/DSCN MKR DOCD: CPT | Performed by: HOSPITALIST

## 2025-08-05 PROCEDURE — 99213 OFFICE O/P EST LOW 20 MIN: CPT | Performed by: HOSPITALIST

## 2025-08-05 PROCEDURE — 1159F MED LIST DOCD IN RCRD: CPT | Performed by: HOSPITALIST

## 2025-08-05 PROCEDURE — 3079F DIAST BP 80-89 MM HG: CPT | Performed by: HOSPITALIST

## 2025-08-05 PROCEDURE — G8427 DOCREV CUR MEDS BY ELIG CLIN: HCPCS | Performed by: HOSPITALIST

## 2025-08-05 PROCEDURE — 3077F SYST BP >= 140 MM HG: CPT | Performed by: HOSPITALIST

## 2025-08-05 RX ORDER — TAMSULOSIN HYDROCHLORIDE 0.4 MG/1
CAPSULE ORAL
Qty: 90 CAPSULE | Refills: 1 | Status: SHIPPED | OUTPATIENT
Start: 2025-08-05

## 2025-08-05 RX ORDER — LOSARTAN POTASSIUM 50 MG/1
50 TABLET ORAL DAILY
Qty: 90 TABLET | Refills: 1 | Status: SHIPPED | OUTPATIENT
Start: 2025-08-05

## 2025-08-05 RX ORDER — FINASTERIDE 5 MG/1
5 TABLET, FILM COATED ORAL DAILY
Qty: 90 TABLET | Refills: 1 | Status: SHIPPED | OUTPATIENT
Start: 2025-08-05

## 2025-08-05 RX ORDER — MONTELUKAST SODIUM 10 MG/1
10 TABLET ORAL DAILY
Qty: 90 TABLET | Refills: 1 | Status: SHIPPED | OUTPATIENT
Start: 2025-08-05

## 2025-08-05 SDOH — ECONOMIC STABILITY: FOOD INSECURITY: WITHIN THE PAST 12 MONTHS, YOU WORRIED THAT YOUR FOOD WOULD RUN OUT BEFORE YOU GOT MONEY TO BUY MORE.: NEVER TRUE

## 2025-08-05 SDOH — ECONOMIC STABILITY: FOOD INSECURITY: WITHIN THE PAST 12 MONTHS, THE FOOD YOU BOUGHT JUST DIDN'T LAST AND YOU DIDN'T HAVE MONEY TO GET MORE.: NEVER TRUE

## 2025-08-05 ASSESSMENT — PATIENT HEALTH QUESTIONNAIRE - PHQ9
SUM OF ALL RESPONSES TO PHQ QUESTIONS 1-9: 0
1. LITTLE INTEREST OR PLEASURE IN DOING THINGS: NOT AT ALL
2. FEELING DOWN, DEPRESSED OR HOPELESS: NOT AT ALL
SUM OF ALL RESPONSES TO PHQ QUESTIONS 1-9: 0
DEPRESSION UNABLE TO ASSESS: PT REFUSES
SUM OF ALL RESPONSES TO PHQ QUESTIONS 1-9: 0
SUM OF ALL RESPONSES TO PHQ QUESTIONS 1-9: 0

## 2025-08-05 ASSESSMENT — ENCOUNTER SYMPTOMS
RESPIRATORY NEGATIVE: 1
EYES NEGATIVE: 1
GASTROINTESTINAL NEGATIVE: 1
ALLERGIC/IMMUNOLOGIC NEGATIVE: 1

## 2025-09-05 ENCOUNTER — OFFICE VISIT (OUTPATIENT)
Age: 85
End: 2025-09-05
Payer: MEDICARE

## 2025-09-05 VITALS
DIASTOLIC BLOOD PRESSURE: 69 MMHG | WEIGHT: 161 LBS | SYSTOLIC BLOOD PRESSURE: 116 MMHG | HEART RATE: 65 BPM | BODY MASS INDEX: 26.82 KG/M2 | HEIGHT: 65 IN | TEMPERATURE: 97.3 F | OXYGEN SATURATION: 98 %

## 2025-09-05 DIAGNOSIS — M17.10 ARTHRITIS OF KNEE: ICD-10-CM

## 2025-09-05 DIAGNOSIS — Z23 NEED FOR TDAP VACCINATION: ICD-10-CM

## 2025-09-05 DIAGNOSIS — R09.81 NASAL CONGESTION: ICD-10-CM

## 2025-09-05 DIAGNOSIS — R09.82 POST-NASAL DRIP: ICD-10-CM

## 2025-09-05 DIAGNOSIS — N40.1 BENIGN PROSTATIC HYPERPLASIA WITH NOCTURIA: ICD-10-CM

## 2025-09-05 DIAGNOSIS — J18.9 PNEUMONIA OF RIGHT LOWER LOBE DUE TO INFECTIOUS ORGANISM: ICD-10-CM

## 2025-09-05 DIAGNOSIS — I10 ESSENTIAL HYPERTENSION: Primary | ICD-10-CM

## 2025-09-05 DIAGNOSIS — F51.01 PRIMARY INSOMNIA: ICD-10-CM

## 2025-09-05 DIAGNOSIS — J45.20 MILD INTERMITTENT ASTHMA WITHOUT COMPLICATION: ICD-10-CM

## 2025-09-05 DIAGNOSIS — R35.1 BENIGN PROSTATIC HYPERPLASIA WITH NOCTURIA: ICD-10-CM

## 2025-09-05 PROCEDURE — 3078F DIAST BP <80 MM HG: CPT | Performed by: HOSPITALIST

## 2025-09-05 PROCEDURE — PBSHW TDAP, BOOSTRIX, (AGE 10 YRS+), IM: Performed by: HOSPITALIST

## 2025-09-05 PROCEDURE — 90471 IMMUNIZATION ADMIN: CPT | Performed by: HOSPITALIST

## 2025-09-05 PROCEDURE — 99213 OFFICE O/P EST LOW 20 MIN: CPT | Performed by: HOSPITALIST

## 2025-09-05 PROCEDURE — G8427 DOCREV CUR MEDS BY ELIG CLIN: HCPCS | Performed by: HOSPITALIST

## 2025-09-05 PROCEDURE — 99214 OFFICE O/P EST MOD 30 MIN: CPT | Performed by: HOSPITALIST

## 2025-09-05 PROCEDURE — 1123F ACP DISCUSS/DSCN MKR DOCD: CPT | Performed by: HOSPITALIST

## 2025-09-05 PROCEDURE — 1159F MED LIST DOCD IN RCRD: CPT | Performed by: HOSPITALIST

## 2025-09-05 PROCEDURE — G8417 CALC BMI ABV UP PARAM F/U: HCPCS | Performed by: HOSPITALIST

## 2025-09-05 PROCEDURE — 3074F SYST BP LT 130 MM HG: CPT | Performed by: HOSPITALIST

## 2025-09-05 PROCEDURE — 90715 TDAP VACCINE 7 YRS/> IM: CPT | Performed by: HOSPITALIST

## 2025-09-05 PROCEDURE — 1036F TOBACCO NON-USER: CPT | Performed by: HOSPITALIST

## 2025-09-05 PROCEDURE — PBSHW IMMUNIZ ADMIN,1 SINGLE/COMB VAC/TOXOID: Performed by: HOSPITALIST

## 2025-09-05 SDOH — ECONOMIC STABILITY: FOOD INSECURITY: WITHIN THE PAST 12 MONTHS, THE FOOD YOU BOUGHT JUST DIDN'T LAST AND YOU DIDN'T HAVE MONEY TO GET MORE.: NEVER TRUE

## 2025-09-05 SDOH — ECONOMIC STABILITY: FOOD INSECURITY: WITHIN THE PAST 12 MONTHS, YOU WORRIED THAT YOUR FOOD WOULD RUN OUT BEFORE YOU GOT MONEY TO BUY MORE.: NEVER TRUE

## 2025-09-05 ASSESSMENT — PATIENT HEALTH QUESTIONNAIRE - PHQ9
6. FEELING BAD ABOUT YOURSELF - OR THAT YOU ARE A FAILURE OR HAVE LET YOURSELF OR YOUR FAMILY DOWN: NOT AT ALL
7. TROUBLE CONCENTRATING ON THINGS, SUCH AS READING THE NEWSPAPER OR WATCHING TELEVISION: NOT AT ALL
SUM OF ALL RESPONSES TO PHQ QUESTIONS 1-9: 0
2. FEELING DOWN, DEPRESSED OR HOPELESS: NOT AT ALL
5. POOR APPETITE OR OVEREATING: NOT AT ALL
10. IF YOU CHECKED OFF ANY PROBLEMS, HOW DIFFICULT HAVE THESE PROBLEMS MADE IT FOR YOU TO DO YOUR WORK, TAKE CARE OF THINGS AT HOME, OR GET ALONG WITH OTHER PEOPLE: NOT DIFFICULT AT ALL
SUM OF ALL RESPONSES TO PHQ QUESTIONS 1-9: 0
3. TROUBLE FALLING OR STAYING ASLEEP: NOT AT ALL
SUM OF ALL RESPONSES TO PHQ QUESTIONS 1-9: 0
9. THOUGHTS THAT YOU WOULD BE BETTER OFF DEAD, OR OF HURTING YOURSELF: NOT AT ALL
8. MOVING OR SPEAKING SO SLOWLY THAT OTHER PEOPLE COULD HAVE NOTICED. OR THE OPPOSITE, BEING SO FIGETY OR RESTLESS THAT YOU HAVE BEEN MOVING AROUND A LOT MORE THAN USUAL: NOT AT ALL
SUM OF ALL RESPONSES TO PHQ QUESTIONS 1-9: 0
1. LITTLE INTEREST OR PLEASURE IN DOING THINGS: NOT AT ALL
4. FEELING TIRED OR HAVING LITTLE ENERGY: NOT AT ALL

## 2025-09-05 ASSESSMENT — ENCOUNTER SYMPTOMS
COUGH: 1
GASTROINTESTINAL NEGATIVE: 1
RHINORRHEA: 1
EYES NEGATIVE: 1
ALLERGIC/IMMUNOLOGIC NEGATIVE: 1